# Patient Record
Sex: FEMALE | Race: OTHER | Employment: OTHER | URBAN - METROPOLITAN AREA
[De-identification: names, ages, dates, MRNs, and addresses within clinical notes are randomized per-mention and may not be internally consistent; named-entity substitution may affect disease eponyms.]

---

## 2017-01-04 NOTE — PROGRESS NOTES
[facility] / [facility]  ECT Procedure Note    Rosiland Spurling Patient Status:  Outpatient   Age/Gender 77year old female MRN [id number]   Location [address] [facility] Attending Jeffery Stacy MD   Hosp Day # 0 PCP Ta

## 2017-01-04 NOTE — OR NURSING
Pt's glucoscan 264. Pt had received insulin pre procedure. Dr. Marisabel Carpenter notified. No orders received. Pt alert. Skin warm and dry.

## 2017-01-04 NOTE — PROGRESS NOTES
[facility] / [facility]  ECT History & Physical    Tiffany Ledesma Patient Status:  Outpatient   Age/Gender 77year old female MRN [id number]   Location [address] [facility] Attending Mona Lynne MD   Hosp Day # 0 PCP Sharan Flores Onset   • Diabetes Mother    • Cancer Sister    • Diabetes Son    • Diabetes Brother        ROS: As above.   Otherwise unremarkable    Physical Exam:   Ht 61\"    General Appearance:    Alert, cooperative, no distress, appears stated age   Head:    Normocep

## 2017-02-01 ENCOUNTER — TELEPHONE (OUTPATIENT)
Dept: INTERNAL MEDICINE CLINIC | Facility: CLINIC | Age: 77
End: 2017-02-01

## 2017-02-01 NOTE — PROGRESS NOTES
[facility] / [facility]  ECT History & Physical    Ana Maria Goldstein Patient Status:  Outpatient   Age/Gender 77year old female MRN [id number]   Location [address] [facility] Attending Nicole Loredo MD   Hosp Day # 0 PCP Aretha Hoskins Diabetes Brother        ROS:   Unremarkable    Physical Exam:   /76 mmHg  Pulse 67  Resp 14  Ht 61\"  SpO2 93%    General Appearance:    Alert, cooperative, no distress, appears stated age   Head:    Normocephalic, without obvious abnormality, atraum

## 2017-02-01 NOTE — PROGRESS NOTES
[facility] / [facility]  ECT Procedure Note    Josh Phipps Patient Status:  Outpatient   Age/Gender 77year old female MRN [id number]   Location [address] [facility] Attending Tori Man MD   Hosp Day # 0 PCP Ta

## 2017-02-20 ENCOUNTER — OFFICE VISIT (OUTPATIENT)
Dept: SURGERY | Facility: CLINIC | Age: 77
End: 2017-02-20

## 2017-02-20 VITALS — HEIGHT: 62 IN | WEIGHT: 165 LBS | BODY MASS INDEX: 30.36 KG/M2

## 2017-02-20 DIAGNOSIS — Z85.3 HISTORY OF BREAST CANCER: ICD-10-CM

## 2017-02-20 DIAGNOSIS — E11.65 UNCONTROLLED TYPE 2 DIABETES MELLITUS WITH HYPERGLYCEMIA, WITHOUT LONG-TERM CURRENT USE OF INSULIN (HCC): ICD-10-CM

## 2017-02-20 DIAGNOSIS — I87.1 SUPERIOR VENA CAVA SYNDROME: Primary | ICD-10-CM

## 2017-02-20 DIAGNOSIS — F31.5 BIPOLAR I DISORDER, MOST RECENT EPISODE (OR CURRENT) DEPRESSED, SEVERE, SPECIFIED AS WITH PSYCHOTIC BEHAVIOR (HCC): ICD-10-CM

## 2017-02-20 DIAGNOSIS — F25.0 SCHIZOAFFECTIVE DISORDER, BIPOLAR TYPE (HCC): ICD-10-CM

## 2017-02-20 PROCEDURE — 99205 OFFICE O/P NEW HI 60 MIN: CPT | Performed by: COLON & RECTAL SURGERY

## 2017-02-20 NOTE — PATIENT INSTRUCTIONS
This patient presents for consultation from the primary care service and her psychiatrist regarding placement of a Port-A-Cath and IV access for ECT therapy.     This patient has schizophrenia and apparently benefits from ECT for her schizophrenia and bipol slightly chronic. She has no facial swelling or edema. She has very minimally prominent JVD. She has had incisions in the subclavicular regions of the right and left chest wall indicative of previous port access to the vena cava.   She has a right breast syndrome, then a upper extremity or subclavian PowerPort may not be indicated we may have to go into a groin access with the port being placed on the abdominal wall.     She has a superior vena cava syndrome, further vascular workup may be necessary and may

## 2017-02-20 NOTE — H&P
New Patient Visit Note       Active Problems      1. Superior vena cava syndrome    2. History of breast cancer    3. Bipolar I disorder, most recent episode (or current) depressed, severe, specified as with psychotic behavior ([facility]    4.  Schizoaffective d ovaries. The patient does not appear to be on any hormonal replacement therapy. She is no longer seeing an oncologist or any surgeon regarding her chest wall and for cancer surveillance.     It has been many years since her last right-sided mammogram. Past Surgical History    MASTECTOMY LEFT      CATARACT  04/2006    Comment left    COLONOSCOPY      Comment tublar adenoma    ECT PROVIDED Bilateral 11/2014    Comment ! st Tx. 11/2014       The family history and social history have been reviewed b the mouth or throat as needed. Disp:  Rfl:    omeprazole 20 MG Oral Capsule Delayed Release Take 20 mg by mouth every morning before breakfast. Disp:  Rfl:    DiphenhydrAMINE HCl 25 MG Oral Cap Take 25 mg by mouth every 6 (six) hours as needed for Itching. Neurological: Negative for tremors, syncope and weakness. Hematological: Negative for adenopathy. Does not bruise/bleed easily. Psychiatric/Behavioral: Negative for behavioral problems and sleep disturbance.        Physical Findings   Ht 62\"  Wt 165 patient has no palpable supra clavicular lymph nodes, and no palpable anterior neck nodes. The patient has no other nodules or lymphadenopathy in the entire neck region.     Breath sounds are clear to auscultation bilaterally without wheezes rales or rhonc has had breast cancer of the left breast when she was 55years old. She has had Port-A-Cath on both the right and left side. She has a history of DVT including lower extremities and at least one upper extremity.   She has lymphedema of the left arm that i nipple. The right breast parenchyma shows no mass lesions or nodules. There are no changes to the skin other than the prominent venous distribution consistent with the entire chest wall. The right axilla shows no evidence of lymphadenopathy.     The left carotid off superior vena cava. It may be a complication of her previous chemotherapy. She may also be in some type of a hypercoagulable state. My next visit will be to make further surgical decision making.   The mammogram will help us fully assess th

## 2017-02-21 RX ORDER — HEPARIN SODIUM 5000 [USP'U]/ML
5000 INJECTION, SOLUTION INTRAVENOUS; SUBCUTANEOUS ONCE
Status: CANCELLED | OUTPATIENT
Start: 2017-02-21 | End: 2017-02-21

## 2017-02-21 RX ORDER — LEVOTHYROXINE SODIUM 0.07 MG/1
100 TABLET ORAL
COMMUNITY
End: 2017-09-12 | Stop reason: DRUGHIGH

## 2017-02-21 RX ORDER — ONDANSETRON 4 MG/1
4 TABLET, ORALLY DISINTEGRATING ORAL EVERY 8 HOURS PRN
COMMUNITY
End: 2018-03-27 | Stop reason: ALTCHOICE

## 2017-02-21 RX ORDER — DEXTROSE MONOHYDRATE 25 G/50ML
50 INJECTION, SOLUTION INTRAVENOUS
Status: DISCONTINUED | OUTPATIENT
Start: 2017-02-21 | End: 2017-02-21

## 2017-02-21 RX ORDER — SODIUM CHLORIDE, SODIUM LACTATE, POTASSIUM CHLORIDE, CALCIUM CHLORIDE 600; 310; 30; 20 MG/100ML; MG/100ML; MG/100ML; MG/100ML
INJECTION, SOLUTION INTRAVENOUS CONTINUOUS
Status: CANCELLED | OUTPATIENT
Start: 2017-02-21

## 2017-02-21 RX ORDER — LOPERAMIDE HYDROCHLORIDE 2 MG/1
2 CAPSULE ORAL EVERY 4 HOURS PRN
COMMUNITY
End: 2018-05-31

## 2017-02-21 RX ORDER — FUROSEMIDE 40 MG/1
40 TABLET ORAL 2 TIMES DAILY
Status: ON HOLD | COMMUNITY
End: 2017-03-06

## 2017-02-21 RX ORDER — METOPROLOL SUCCINATE 25 MG/1
25 TABLET, EXTENDED RELEASE ORAL DAILY
COMMUNITY

## 2017-02-21 NOTE — PLAN OF CARE
RN spoke to [facility] at [facility]. This place has a service that can do lab and EKGs, so RN sent the fax requirements to [facility]. RN notified daughter of this information as well.

## 2017-02-25 ENCOUNTER — HOSPITAL ENCOUNTER (OUTPATIENT)
Dept: MAMMOGRAPHY | Age: 77
Discharge: HOME | End: 2017-02-25
Attending: COLON & RECTAL SURGERY
Payer: MEDICARE

## 2017-02-25 ENCOUNTER — HOSPITAL ENCOUNTER (OUTPATIENT)
Dept: ULTRASOUND IMAGING | Age: 77
Discharge: HOME | End: 2017-02-25
Attending: COLON & RECTAL SURGERY
Payer: MEDICARE

## 2017-02-25 DIAGNOSIS — I87.1 SUPERIOR VENA CAVA SYNDROME: ICD-10-CM

## 2017-02-25 DIAGNOSIS — Z85.3 HISTORY OF BREAST CANCER: ICD-10-CM

## 2017-02-25 PROCEDURE — 77067 SCR MAMMO BI INCL CAD: CPT

## 2017-02-25 PROCEDURE — 93970 EXTREMITY STUDY: CPT

## 2017-02-28 NOTE — PROGRESS NOTES
Quick Note:    Mammogram reviewed. Mammogram normal  If patient had an appointment when mammogram results were available, no action is necessary.   If there was no appointment when results were obtained, please schedule an appointment.  ______

## 2017-02-28 NOTE — PROGRESS NOTES
Quick Note:      This patient needs a CT scan of the chest to evaluate her for a superior vena cava syndrome    ______

## 2017-03-01 NOTE — PROGRESS NOTES
[facility] / [facility]  ECT Procedure Note    Brian Lopez Patient Status:  Outpatient   Age/Gender 77year old female MRN [id number]   Location [address] [facility] Attending Kyrie Arizmendi MD   Hosp Day # 0 PCP Ta

## 2017-03-01 NOTE — OR NURSING
Called ,Surgeon office and left message on [employer] advising of contraindication alert and requested that surgeon review and advise

## 2017-03-01 NOTE — PROGRESS NOTES
Dilcia Hagen / [facility]  ECT History & Physical    [facility] Patient Status:  Outpatient   Age/Gender 77year old female MRN [id number]   Location [address] [facility] Attending Henna Haywood MD   Hosp Day # 0 PCP Mitch Isabel with mastectomy, chemo and radiation. RADIATION LEFT Left 1998    Comment left breast cancer with mastectomy, chemo and radiation. MASTECTOMY LEFT Left 1998    Comment left breast cancer with mastectomy, chemo and radiation. Family History:   Fa

## 2017-03-01 NOTE — OR NURSING
Contraindication alert sent to ,surgeon to advise of increase bleeding risk due to interraction of Heparin and Toradol

## 2017-03-02 ENCOUNTER — TELEPHONE (OUTPATIENT)
Dept: SURGERY | Facility: CLINIC | Age: 77
End: 2017-03-02

## 2017-03-02 ENCOUNTER — ANESTHESIA EVENT (OUTPATIENT)
Dept: SURGERY | Facility: HOSPITAL | Age: 77
DRG: 300 | End: 2017-03-02
Payer: MEDICARE

## 2017-03-02 DIAGNOSIS — I87.1 SUPERIOR VENA CAVA SYNDROME: Primary | ICD-10-CM

## 2017-03-02 NOTE — PROGRESS NOTES
Quick Note:    Notified daughter that CT chest will be done prior to surgery on 3/3/17.  Order placed.  ______

## 2017-03-02 NOTE — OR PREOP
Spoke with Bret Cazares . .. To  Indicate they need to get orders for CT dye allergy, Insulin dosing for OR day, pls fax bmp and ekg readings to [facility]. Arrival time 1130. Hold Metformin in am. # given for questions if needed.

## 2017-03-02 NOTE — OR NURSING
Scheduled CT scan at 1230 with central scheduling ,[facility]. Contacted ULICES Cruz for ,surgeon to advise that patient will need premedication due to iodine allergy.  SHe will contact radiology to obtain protocol orders and call to [phone] [facility]

## 2017-03-02 NOTE — TELEPHONE ENCOUNTER
Left message for Brandy Camacho at [facility]. Patient has allergy to iodine and need to be pre medicated. She is scheduled for CT tomorrow.  She need to have Prednisone 50 mg at 13 hr, 7 hrs and 1 hr prior to contrast and benadryl 50 mg 1 hour prior to contrast p

## 2017-03-02 NOTE — OR NURSING
Have not received lab result or signed Ekg for procedure tomorrow. Left message on voice mail of [facility] supervisor requesting call back asap.

## 2017-03-03 ENCOUNTER — ANESTHESIA (OUTPATIENT)
Dept: SURGERY | Facility: HOSPITAL | Age: 77
DRG: 300 | End: 2017-03-03
Payer: MEDICARE

## 2017-03-03 ENCOUNTER — HOSPITAL ENCOUNTER (OUTPATIENT)
Dept: CT IMAGING | Facility: HOSPITAL | Age: 77
Discharge: HOME | DRG: 300 | End: 2017-03-03
Attending: COLON & RECTAL SURGERY
Payer: MEDICARE

## 2017-03-03 ENCOUNTER — SURGERY (OUTPATIENT)
Age: 77
End: 2017-03-03

## 2017-03-03 ENCOUNTER — APPOINTMENT (OUTPATIENT)
Dept: GENERAL RADIOLOGY | Facility: HOSPITAL | Age: 77
DRG: 300 | End: 2017-03-03
Attending: COLON & RECTAL SURGERY
Payer: MEDICARE

## 2017-03-03 ENCOUNTER — HOSPITAL ENCOUNTER (INPATIENT)
Facility: HOSPITAL | Age: 77
LOS: 3 days | Discharge: NURSING, REHAB OR LONG-TERM CARE | DRG: 300 | End: 2017-03-06
Attending: COLON & RECTAL SURGERY | Admitting: COLON & RECTAL SURGERY
Payer: MEDICARE

## 2017-03-03 ENCOUNTER — TELEPHONE (OUTPATIENT)
Dept: SURGERY | Facility: CLINIC | Age: 77
End: 2017-03-03

## 2017-03-03 DIAGNOSIS — I87.1 SUPERIOR VENA CAVA SYNDROME: ICD-10-CM

## 2017-03-03 DIAGNOSIS — D50.9 IRON DEFICIENCY ANEMIA, UNSPECIFIED IRON DEFICIENCY ANEMIA TYPE: Primary | ICD-10-CM

## 2017-03-03 PROBLEM — Z91.81 AT RISK FOR FALLING: Status: ACTIVE | Noted: 2017-03-03

## 2017-03-03 LAB
APTT PPP: 27.3 SECONDS (ref 25–34)
BUN BLD-MCNC: 26 MG/DL (ref 8–20)
CALCIUM BLD-MCNC: 9.5 MG/DL (ref 8.3–10.3)
CHLORIDE: 99 MMOL/L (ref 101–111)
CHOLEST SMN-MCNC: 137 MG/DL (ref ?–200)
CO2: 25 MMOL/L (ref 22–32)
CREAT BLD-MCNC: 1.39 MG/DL (ref 0.55–1.02)
D-DIMER: 1 UG/ML FEU (ref 0–0.49)
ERYTHROCYTE [DISTWIDTH] IN BLOOD BY AUTOMATED COUNT: 15.4 % (ref 11.5–16)
EST. AVERAGE GLUCOSE BLD GHB EST-MCNC: 209 MG/DL (ref 68–126)
GLUCOSE BLD-MCNC: 286 MG/DL (ref 65–99)
GLUCOSE BLD-MCNC: 297 MG/DL (ref 70–99)
GLUCOSE BLD-MCNC: 317 MG/DL (ref 65–99)
GLUCOSE BLD-MCNC: 323 MG/DL (ref 65–99)
GLUCOSE BLD-MCNC: 336 MG/DL (ref 65–99)
GLUCOSE BLD-MCNC: 359 MG/DL (ref 65–99)
HBA1C MFR BLD HPLC: 8.9 % (ref ?–5.7)
HCT VFR BLD AUTO: 32.6 % (ref 34–50)
HDLC SERPL-MCNC: 62 MG/DL (ref 45–?)
HDLC SERPL: 2.21 {RATIO} (ref ?–4.44)
HGB BLD-MCNC: 10.1 G/DL (ref 12–16)
LDLC SERPL CALC-MCNC: 49 MG/DL (ref ?–130)
MCH RBC QN AUTO: 25.9 PG (ref 27–33.2)
MCHC RBC AUTO-ENTMCNC: 31 G/DL (ref 31–37)
MCV RBC AUTO: 83.6 FL (ref 81–100)
NONHDLC SERPL-MCNC: 75 MG/DL (ref ?–130)
PLATELET # BLD AUTO: 261 10(3)UL (ref 150–450)
POTASSIUM SERPL-SCNC: 3.9 MMOL/L (ref 3.6–5.1)
RBC # BLD AUTO: 3.9 X10(6)UL (ref 3.8–5.1)
RED CELL DISTRIBUTION WIDTH-SD: 46.5 FL (ref 35.1–46.3)
SODIUM SERPL-SCNC: 135 MMOL/L (ref 136–144)
TRIGLYCERIDES: 131 MG/DL (ref ?–150)
VLDL: 26 MG/DL (ref 5–40)
WBC # BLD AUTO: 9.3 X10(3) UL (ref 4–13)

## 2017-03-03 PROCEDURE — B51CYZA FLUOROSCOPY OF LEFT LOWER EXTREMITY VEINS USING OTHER CONTRAST, GUIDANCE: ICD-10-PCS | Performed by: COLON & RECTAL SURGERY

## 2017-03-03 PROCEDURE — 99223 1ST HOSP IP/OBS HIGH 75: CPT | Performed by: HOSPITALIST

## 2017-03-03 PROCEDURE — 0YJ73ZZ INSPECTION OF RIGHT FEMORAL REGION, PERCUTANEOUS APPROACH: ICD-10-PCS | Performed by: COLON & RECTAL SURGERY

## 2017-03-03 PROCEDURE — 0JHP3WZ INSERTION OF TOTALLY IMPLANTABLE VASCULAR ACCESS DEVICE INTO LEFT LOWER LEG SUBCUTANEOUS TISSUE AND FASCIA, PERCUTANEOUS APPROACH: ICD-10-PCS | Performed by: COLON & RECTAL SURGERY

## 2017-03-03 PROCEDURE — 76000 FLUOROSCOPY <1 HR PHYS/QHP: CPT

## 2017-03-03 PROCEDURE — 06HN33Z INSERTION OF INFUSION DEVICE INTO LEFT FEMORAL VEIN, PERCUTANEOUS APPROACH: ICD-10-PCS | Performed by: COLON & RECTAL SURGERY

## 2017-03-03 PROCEDURE — 71260 CT THORAX DX C+: CPT

## 2017-03-03 DEVICE — POWERPORT M.R.I. IMPLANTABLE PORT WITH PRE-ATTACHED 9.6F  OPEN-ENDED SINGLE-LUMEN VENOUS CATHETER. INTERMEDIATE KIT (WITHOUT SUTURE PLUGS)
Type: IMPLANTABLE DEVICE | Site: GROIN | Status: FUNCTIONAL
Brand: POWERPORT M.R.I.

## 2017-03-03 RX ORDER — HEPARIN SODIUM AND DEXTROSE 10000; 5 [USP'U]/100ML; G/100ML
INJECTION INTRAVENOUS CONTINUOUS
Status: DISCONTINUED | OUTPATIENT
Start: 2017-03-04 | End: 2017-03-05

## 2017-03-03 RX ORDER — PREGABALIN 75 MG/1
75 CAPSULE ORAL 2 TIMES DAILY
Status: DISCONTINUED | OUTPATIENT
Start: 2017-03-03 | End: 2017-03-06

## 2017-03-03 RX ORDER — ONDANSETRON 2 MG/ML
4 INJECTION INTRAMUSCULAR; INTRAVENOUS AS NEEDED
Status: DISCONTINUED | OUTPATIENT
Start: 2017-03-03 | End: 2017-03-03 | Stop reason: HOSPADM

## 2017-03-03 RX ORDER — DEXTROSE MONOHYDRATE 25 G/50ML
50 INJECTION, SOLUTION INTRAVENOUS
Status: DISCONTINUED | OUTPATIENT
Start: 2017-03-03 | End: 2017-03-03

## 2017-03-03 RX ORDER — METOCLOPRAMIDE HYDROCHLORIDE 5 MG/ML
10 INJECTION INTRAMUSCULAR; INTRAVENOUS AS NEEDED
Status: DISCONTINUED | OUTPATIENT
Start: 2017-03-03 | End: 2017-03-03 | Stop reason: HOSPADM

## 2017-03-03 RX ORDER — SERTRALINE HYDROCHLORIDE 100 MG/1
100 TABLET, FILM COATED ORAL DAILY
Status: DISCONTINUED | OUTPATIENT
Start: 2017-03-03 | End: 2017-03-06

## 2017-03-03 RX ORDER — INSULIN ASPART 100 [IU]/ML
8 INJECTION, SOLUTION INTRAVENOUS; SUBCUTANEOUS ONCE
Status: COMPLETED | OUTPATIENT
Start: 2017-03-03 | End: 2017-03-03

## 2017-03-03 RX ORDER — HYDROCODONE BITARTRATE AND ACETAMINOPHEN 5; 325 MG/1; MG/1
1 TABLET ORAL AS NEEDED
Status: DISCONTINUED | OUTPATIENT
Start: 2017-03-03 | End: 2017-03-03 | Stop reason: HOSPADM

## 2017-03-03 RX ORDER — ENOXAPARIN SODIUM 100 MG/ML
40 INJECTION SUBCUTANEOUS NIGHTLY
Status: DISCONTINUED | OUTPATIENT
Start: 2017-03-03 | End: 2017-03-03

## 2017-03-03 RX ORDER — DEXTROSE MONOHYDRATE 25 G/50ML
50 INJECTION, SOLUTION INTRAVENOUS
Status: DISCONTINUED | OUTPATIENT
Start: 2017-03-03 | End: 2017-03-03 | Stop reason: HOSPADM

## 2017-03-03 RX ORDER — HYDROCODONE BITARTRATE AND ACETAMINOPHEN 5; 325 MG/1; MG/1
2 TABLET ORAL AS NEEDED
Status: DISCONTINUED | OUTPATIENT
Start: 2017-03-03 | End: 2017-03-03

## 2017-03-03 RX ORDER — MEPERIDINE HYDROCHLORIDE 25 MG/ML
12.5 INJECTION INTRAMUSCULAR; INTRAVENOUS; SUBCUTANEOUS AS NEEDED
Status: DISCONTINUED | OUTPATIENT
Start: 2017-03-03 | End: 2017-03-03 | Stop reason: HOSPADM

## 2017-03-03 RX ORDER — INSULIN ASPART 100 [IU]/ML
INJECTION, SOLUTION INTRAVENOUS; SUBCUTANEOUS
Status: DISPENSED
Start: 2017-03-03 | End: 2017-03-04

## 2017-03-03 RX ORDER — MIDAZOLAM HYDROCHLORIDE 1 MG/ML
1 INJECTION INTRAMUSCULAR; INTRAVENOUS EVERY 5 MIN PRN
Status: DISCONTINUED | OUTPATIENT
Start: 2017-03-03 | End: 2017-03-03 | Stop reason: HOSPADM

## 2017-03-03 RX ORDER — HYDROMORPHONE HYDROCHLORIDE 1 MG/ML
0.4 INJECTION, SOLUTION INTRAMUSCULAR; INTRAVENOUS; SUBCUTANEOUS EVERY 5 MIN PRN
Status: DISCONTINUED | OUTPATIENT
Start: 2017-03-03 | End: 2017-03-03 | Stop reason: HOSPADM

## 2017-03-03 RX ORDER — HEPARIN SODIUM AND DEXTROSE 10000; 5 [USP'U]/100ML; G/100ML
18 INJECTION INTRAVENOUS ONCE
Status: COMPLETED | OUTPATIENT
Start: 2017-03-03 | End: 2017-03-03

## 2017-03-03 RX ORDER — HEPARIN SODIUM 5000 [USP'U]/ML
5000 INJECTION, SOLUTION INTRAVENOUS; SUBCUTANEOUS ONCE
Status: COMPLETED | OUTPATIENT
Start: 2017-03-03 | End: 2017-03-03

## 2017-03-03 RX ORDER — METOPROLOL SUCCINATE 25 MG/1
25 TABLET, EXTENDED RELEASE ORAL
Status: DISCONTINUED | OUTPATIENT
Start: 2017-03-04 | End: 2017-03-06

## 2017-03-03 RX ORDER — OLANZAPINE 20 MG/1
20 TABLET ORAL NIGHTLY
Status: DISCONTINUED | OUTPATIENT
Start: 2017-03-03 | End: 2017-03-06

## 2017-03-03 RX ORDER — SODIUM CHLORIDE, SODIUM LACTATE, POTASSIUM CHLORIDE, CALCIUM CHLORIDE 600; 310; 30; 20 MG/100ML; MG/100ML; MG/100ML; MG/100ML
INJECTION, SOLUTION INTRAVENOUS CONTINUOUS
Status: DISCONTINUED | OUTPATIENT
Start: 2017-03-03 | End: 2017-03-03

## 2017-03-03 RX ORDER — NALOXONE HYDROCHLORIDE 0.4 MG/ML
80 INJECTION, SOLUTION INTRAMUSCULAR; INTRAVENOUS; SUBCUTANEOUS AS NEEDED
Status: DISCONTINUED | OUTPATIENT
Start: 2017-03-03 | End: 2017-03-03 | Stop reason: HOSPADM

## 2017-03-03 RX ORDER — DEXAMETHASONE SODIUM PHOSPHATE 4 MG/ML
4 VIAL (ML) INJECTION AS NEEDED
Status: DISCONTINUED | OUTPATIENT
Start: 2017-03-03 | End: 2017-03-03 | Stop reason: HOSPADM

## 2017-03-03 RX ORDER — HYDROCODONE BITARTRATE AND ACETAMINOPHEN 5; 325 MG/1; MG/1
1 TABLET ORAL AS NEEDED
Status: DISCONTINUED | OUTPATIENT
Start: 2017-03-03 | End: 2017-03-03

## 2017-03-03 RX ORDER — HYDROCODONE BITARTRATE AND ACETAMINOPHEN 5; 325 MG/1; MG/1
2 TABLET ORAL AS NEEDED
Status: DISCONTINUED | OUTPATIENT
Start: 2017-03-03 | End: 2017-03-03 | Stop reason: HOSPADM

## 2017-03-03 RX ORDER — BUPIVACAINE HYDROCHLORIDE AND EPINEPHRINE 5; 5 MG/ML; UG/ML
INJECTION, SOLUTION EPIDURAL; INTRACAUDAL; PERINEURAL AS NEEDED
Status: DISCONTINUED | OUTPATIENT
Start: 2017-03-03 | End: 2017-03-03 | Stop reason: HOSPADM

## 2017-03-03 RX ORDER — ACETAMINOPHEN 325 MG/1
650 TABLET ORAL EVERY 6 HOURS PRN
Status: DISCONTINUED | OUTPATIENT
Start: 2017-03-03 | End: 2017-03-06

## 2017-03-03 RX ORDER — HYDRALAZINE HYDROCHLORIDE 50 MG/1
100 TABLET, FILM COATED ORAL 2 TIMES DAILY
Status: DISCONTINUED | OUTPATIENT
Start: 2017-03-03 | End: 2017-03-06

## 2017-03-03 RX ORDER — INSULIN ASPART 100 [IU]/ML
INJECTION, SOLUTION INTRAVENOUS; SUBCUTANEOUS ONCE
Status: COMPLETED | OUTPATIENT
Start: 2017-03-03 | End: 2017-03-03

## 2017-03-03 RX ORDER — DIPHENHYDRAMINE HYDROCHLORIDE 50 MG/ML
12.5 INJECTION INTRAMUSCULAR; INTRAVENOUS AS NEEDED
Status: DISCONTINUED | OUTPATIENT
Start: 2017-03-03 | End: 2017-03-03 | Stop reason: HOSPADM

## 2017-03-03 NOTE — TELEPHONE ENCOUNTER
Dr. Chapman Saba called results of chest CT - shows SVC is occluded. Information conveyed to [facility] who is preparing pt for surgery and she will contact Dr. Kasie Mg with this information.

## 2017-03-03 NOTE — H&P (VIEW-ONLY)
New Patient Visit Note       Active Problems      1. Superior vena cava syndrome    2. History of breast cancer    3. Bipolar I disorder, most recent episode (or current) depressed, severe, specified as with psychotic behavior ([facility] [address]    4.  Schizoaffective d She had her first period at age 13, her first child at age 26. She went through the change in life at age 55. She has a sister who  from breast cancer at age 48. There is nobody in the family with cancer of the ovaries.     The patient does not appea • Cancer ([facility] [address]      hx of breast ca to left breast documented in progress note 2012, unknown exact time of breast cancer   • Personal history of antineoplastic chemotherapy      OVER 20 YRS AGO   • Exposure to radiation          Past Surgical History    MA ipratropium-albuterol 0.5-2.5 (3) MG/3ML Inhalation Solution Take 3 mL by nebulization every 6 (six) hours as needed. Disp:  Rfl:    Sertraline HCl 100 MG Oral Tab Take 100 mg by mouth daily.  Disp:  Rfl:    Benzocaine-Menthol 4-9 MG Mouth/Throat Lozenge Us Gastrointestinal: Negative for nausea, vomiting, abdominal pain, diarrhea, constipation, blood in stool, abdominal distention and anal bleeding. Genitourinary: Negative for dysuria, urgency, frequency and difficulty urinating.    Musculoskeletal: Negative The left chest wall reveals a mastectomy site. There is scarring and deformity consistent with radiation therapy to the chest wall. She has a very long incision that extends medial to lateral into the axilla.   There are no palpable nodules or abnormaliti Uncontrolled type 2 diabetes mellitus with hyperglycemia, without long-term current use of insulin ([facility] [address]      Plan   This patient presents for consultation from the primary care service and her psychiatrist regarding placement of a Port-A-Cath and IV acces Clinical examination today reveals her chest wall to be completely replaced with revascularization on both sides. She has prominent veins all the way from the neck and down to the upper abdominal wall. They are 3-5 mm in thickness.   They are definitely v We will be ordering a mammogram within the next few days. We will also be ordering a venous Doppler study to rule out superior vena cava syndrome.   This Doppler study should include the superior vena cava, the brachiocephalic vessels bilaterally, and both

## 2017-03-03 NOTE — OR NURSING
Pt received from Ct scan. Ct scan result called to Dr Angela Durham. Ok per Dr Angela Durham to continue to pre-op the patient. Also, per Dr Angela Durham ok for Sub-Q heparin as ordered. Pt's blood sugar 336. [place] called and Meds clarified.  Dr Pattie Simons called and 8 units of N

## 2017-03-03 NOTE — INTERVAL H&P NOTE
Pre-op Diagnosis: Superior vena cava syndrome [I87.1]    The above referenced H&P was reviewed by Horacio Anderson MD on 3/3/2017, the patient was examined and no significant changes have occurred in the patient's condition since the H&P was performed.   I dis

## 2017-03-03 NOTE — ANESTHESIA POSTPROCEDURE EVALUATION
[address] [facility] Patient Status:  Hospital Outpatient Surgery   Age/Gender 77year old female MRN [id number]   [facility] SURGERY Attending Roney Luis MD   Hosp Day # 0 PCP Ping Nieto MD       Anesthesia Post-op Note

## 2017-03-03 NOTE — H&P
TR HOSPITALIST  History and Physical     Fionarichi Dewey Patient Status:  Hospital Outpatient Surgery    1940 MRN [id number]   Location [facility] POST ANESTHESIA CARE UNIT Attending Capri Gutierrez MD   [address] [facility] Day # 0 PCP Kodak Durham MD Alzheimer disease    • Type 2 diabetes mellitus ([facility]    • Diabetic neuropathy ([facility]    • Hypothyroidism    • Personal history of antineoplastic chemotherapy      OVER 20 YRS AGO   • Exposure to radiation    • Cancer ([facility] 1998     left breast cancer in 19 DiphenhydrAMINE HCl 25 MG Oral Cap Take 25 mg by mouth every 4 (four) hours as needed for Itching. Disp:  Rfl:    OLANZapine (ZYPREXA) 20 MG Oral Tab Take 1 tablet (20 mg total) by mouth nightly.  Disp: 30 tablet Rfl: 5   MetFORMIN HCl (GLUCOPHAGE) 1000 neurological deficits. CNII-XII grossly intact. Musculoskeletal: Moves all extremities. Extremities: No edema or cyanosis. Integument: No rashes or lesions. Psychiatric: Appropriate mood and affect.       Diagnostic Data:      Labs:  No results for inp

## 2017-03-03 NOTE — OPERATIVE REPORT
[facility]  Operative Note    Bryce Memory Location: OR   CSN [id number] MRN [id number]   Admission Date 3/3/2017 Operation Date 3/3/2017   Attending Physician Sg Foss MD Operating Physician Viktoria Cobos MD     Pre-Operative Diagnosis: Loc Padilla abdomen and both inguinal regions in the anterior medial thigh. I began the procedure by attempting venipuncture on the right side. I did hit the artery once, had the vein a second time but was unable to thread the wire past the common iliac vein.     I

## 2017-03-03 NOTE — ANESTHESIA PREPROCEDURE EVALUATION
PRE-OP EVALUATION    Patient Name: Angel Grigsby    Pre-op Diagnosis: Superior vena cava syndrome [I87.1]    Procedure(s):  INSERTION OF PORT-A-CATH    Surgeon(s) and Role:     Elfego Healy MD - Primary    Pre-op vitals reviewed.         Body mass i Apply to eye daily. 0.025-0.2-0.5% drops one drop both eyes daily Disp:  Rfl:    pregabalin (LYRICA) 75 MG Oral Cap Take 75 mg by mouth 2 (two) times daily. Disp:  Rfl:    Melatonin 5 MG Oral Tab Take by mouth nightly.    Disp:  Rfl:    HydrALAZINE HCl 100 anemia                   Pulmonary    Negative pulmonary ROS. Neuro/Psych  Comment: schizophernia.     (+) depression                                  Past Surgical History    CATARACT  04/2006    Comment left    COLONOSCOPY      Comme

## 2017-03-04 ENCOUNTER — TELEPHONE (OUTPATIENT)
Dept: HEMATOLOGY/ONCOLOGY | Facility: HOSPITAL | Age: 77
End: 2017-03-04

## 2017-03-04 ENCOUNTER — APPOINTMENT (OUTPATIENT)
Dept: CV DIAGNOSTICS | Facility: HOSPITAL | Age: 77
DRG: 300 | End: 2017-03-04
Attending: INTERNAL MEDICINE
Payer: MEDICARE

## 2017-03-04 PROBLEM — D50.9 IRON DEFICIENCY ANEMIA, UNSPECIFIED: Status: ACTIVE | Noted: 2017-03-04

## 2017-03-04 LAB
APTT PPP: 123.8 SECONDS (ref 25–34)
APTT PPP: 144.5 SECONDS (ref 25–34)
APTT PPP: 63.8 SECONDS (ref 25–34)
BASOPHILS # BLD AUTO: 0.06 X10(3) UL (ref 0–0.1)
BASOPHILS NFR BLD AUTO: 0.6 %
BUN BLD-MCNC: 35 MG/DL (ref 8–20)
CALCIUM BLD-MCNC: 9.5 MG/DL (ref 8.3–10.3)
CHLORIDE: 98 MMOL/L (ref 101–111)
CO2: 28 MMOL/L (ref 22–32)
CREAT BLD-MCNC: 1.61 MG/DL (ref 0.55–1.02)
DEPRECATED HBV CORE AB SER IA-ACNC: 6 NG/ML (ref 10–291)
EOSINOPHIL # BLD AUTO: 0.11 X10(3) UL (ref 0–0.3)
EOSINOPHIL NFR BLD AUTO: 1.1 %
ERYTHROCYTE [DISTWIDTH] IN BLOOD BY AUTOMATED COUNT: 15.5 % (ref 11.5–16)
GLUCOSE BLD-MCNC: 146 MG/DL (ref 65–99)
GLUCOSE BLD-MCNC: 160 MG/DL (ref 70–99)
GLUCOSE BLD-MCNC: 240 MG/DL (ref 65–99)
GLUCOSE BLD-MCNC: 350 MG/DL (ref 65–99)
GLUCOSE BLD-MCNC: 363 MG/DL (ref 65–99)
HAV AB SERPL IA-ACNC: 293 PG/ML (ref 193–986)
HCT VFR BLD AUTO: 31.8 % (ref 34–50)
HGB BLD-MCNC: 9.8 G/DL (ref 12–16)
IMMATURE GRANULOCYTE COUNT: 0.05 X10(3) UL (ref 0–1)
IMMATURE GRANULOCYTE RATIO %: 0.5 %
IRON SATURATION: 7 % (ref 13–45)
IRON: 36 UG/DL (ref 28–170)
LYMPHOCYTES # BLD AUTO: 2.28 X10(3) UL (ref 0.9–4)
LYMPHOCYTES NFR BLD AUTO: 22.4 %
MCH RBC QN AUTO: 25.9 PG (ref 27–33.2)
MCHC RBC AUTO-ENTMCNC: 30.8 G/DL (ref 31–37)
MCV RBC AUTO: 83.9 FL (ref 81–100)
MONOCYTES # BLD AUTO: 0.81 X10(3) UL (ref 0.1–0.6)
MONOCYTES NFR BLD AUTO: 8 %
NEUTROPHIL ABS PRELIM: 6.86 X10 (3) UL (ref 1.3–6.7)
NEUTROPHILS # BLD AUTO: 6.86 X10(3) UL (ref 1.3–6.7)
NEUTROPHILS NFR BLD AUTO: 67.4 %
PLATELET # BLD AUTO: 234 10(3)UL (ref 150–450)
POTASSIUM SERPL-SCNC: 3.4 MMOL/L (ref 3.6–5.1)
POTASSIUM SERPL-SCNC: 4.7 MMOL/L (ref 3.6–5.1)
RBC # BLD AUTO: 3.79 X10(6)UL (ref 3.8–5.1)
RED CELL DISTRIBUTION WIDTH-SD: 47.2 FL (ref 35.1–46.3)
RETIC ABS CALC AUTO: 75.8 X10(3) UL (ref 22.5–147.5)
RETIC IRF CALC: 0.28 RATIO (ref 0.09–0.3)
RETIC%: 2 % (ref 0.5–2.5)
RETICULOCYTE HEMOGLOBIN EQUIVALENT: 29.8 PG (ref 28.2–36.3)
SODIUM SERPL-SCNC: 137 MMOL/L (ref 136–144)
TOTAL IRON BINDING CAPACITY: 550 UG/DL (ref 298–536)
TRANSFERRIN: 369 MG/DL (ref 200–360)
WBC # BLD AUTO: 10.2 X10(3) UL (ref 4–13)

## 2017-03-04 PROCEDURE — 99232 SBSQ HOSP IP/OBS MODERATE 35: CPT | Performed by: HOSPITALIST

## 2017-03-04 PROCEDURE — 93306 TTE W/DOPPLER COMPLETE: CPT | Performed by: INTERNAL MEDICINE

## 2017-03-04 PROCEDURE — 93306 TTE W/DOPPLER COMPLETE: CPT

## 2017-03-04 PROCEDURE — 99222 1ST HOSP IP/OBS MODERATE 55: CPT | Performed by: INTERNAL MEDICINE

## 2017-03-04 RX ORDER — POTASSIUM CHLORIDE 20 MEQ/1
40 TABLET, EXTENDED RELEASE ORAL EVERY 4 HOURS
Status: COMPLETED | OUTPATIENT
Start: 2017-03-04 | End: 2017-03-04

## 2017-03-04 RX ORDER — MELATONIN
325 2 TIMES DAILY WITH MEALS
Status: DISCONTINUED | OUTPATIENT
Start: 2017-03-04 | End: 2017-03-05

## 2017-03-04 NOTE — PROGRESS NOTES
Dr. Ignacio Sharif paged regarding blood sugar of 359. Waiting return page. Will continue to monitor. 2230: Spoke with Dr. Ignacio Sharif regarding above. Orders received to give 12 units of insulin tonight x1. See orders. Will implement.  Will continue to [facility]

## 2017-03-04 NOTE — PROGRESS NOTES
.5  HOLD HEPARIN FOR  60MIN AND RESTART , DECREASE 200U/HR , NEXT PTT IN 6HRS . UPDATED WITH PT AND FAMILY .  LT ARM SWOLLEN AND ELEVATED WITH PILLOW , U/S B/L ARM ORDERED BY MD . WILL DO TOMORROW MORNING , DISCUSSED WITH U/S [facility]

## 2017-03-04 NOTE — CONSULTS
[facility]  Report of Consultation    Jena Ang Patient Status:  Inpatient    1940 MRN [id number]   [facility] 3NW-A Attending Madeleine Patton, [address] [facility] Day # 1 PCP Rancho Sanchez MD     Reason for Consultation:    SVC occlus Diabetic neuropathy ([facility]    • Hypothyroidism    • Personal history of antineoplastic chemotherapy      OVER 20 YRS AGO   • Exposure to radiation    • Cancer ([facility] 1998     left breast cancer in 1998 with chemo and radiation.    • Breast CA ([facility] 1998     l Oral, resp. rate 18, height 1.575 m (5' 2\"), weight 74.889 kg (165 lb 1.6 oz), SpO2 96 %. General: No acute distress. Alert and oriented x 3. HEENT: Moist mucous membranes. EOM-I. PERRL  Neck: No lymphadenopathy. No JVD.    Respiratory: Clear to auscult 2 image 39, the superior vena cava appears occluded. This has a similar appearance since 4/2/13.  There is a prominent collateral connecting the right internal jugular vein/superior aspect of vena cava to the inferior vena cava via   an enlarged right inter appears to be chronic. In the absence of thrombosis, anticoagulation is not thought to be of benefit. If she remains symptomatic from the occlusion, she may wish to pursue a vascular surgery evaluation.   Patient is a poor historian and I was unable to co

## 2017-03-04 NOTE — PROGRESS NOTES
Dr. Candice Sun paged regarding elevated D-Dimer. No new orders received. Will see patient tomorrow. Will continue to monitor.

## 2017-03-04 NOTE — PROGRESS NOTES
TR HOSPITAL[facility]  Progress Note     Brianspencer Lopez Patient Status:  Inpatient    1940 MRN [id number]   [facility] 3NW-A Attending Charlotte Oliver, [address] [facility] Day # 1 PCP Syd Bourgeois MD     Chief Complaint: facial/arm swelling    S: Sertraline HCl  100 mg Oral Daily   • insulin aspart  2-10 Units Subcutaneous TID CC and HS       ASSESSMENT / PLAN:     1. SVC syndrome - chronic  1. AC started with heparin gtt  2. Pending venous doppler B/L UE to determine if AC to be continued  3.  Heme

## 2017-03-04 NOTE — CONSULTS
HISTORY OF PRESENT ILLNESS:  The patient is a 77year-old female with history of breast cancer requiring port placement admitted after Dr. Daija Ronquillo attempted venous access for frequent IV access for ECT treatment.   On clinical exam she was diagnosed with SVC the upper chest.  SKIN:  No rashes. NEUROLOGIC:  She is alert and oriented with normal affect. LABORATORY DATA:    No labs    CT chest: 1. The mid/inferior aspect of the superior vena cava appears occluded.  This has a similar appearance to the chest CT

## 2017-03-04 NOTE — PROGRESS NOTES
Dr. Cecy Raza on unit to see patient. States she spoke with on call doctor for oncology (Dr. Felicita Severe) regarding consult and orders. See orders. Orders for heparin drip to be started tonight with no bolus to be given. Informed MD of patient being a DNR.  Malissa nguyen

## 2017-03-04 NOTE — CM/SW NOTE
03/04/17 1000   CM/SW Referral Data   Referral Source Physician;Social Work (self-referral)   Reason for Referral Discharge planning   Informant Patient   Pertinent Medical Hx   Primary Care Physician Name Kalli Sullivan   Patient Info   Patient's Mental Status

## 2017-03-04 NOTE — PROGRESS NOTES
NURSING ADMISSION NOTE      Patient admitted via Bed  Oriented to room. Safety precautions initiated. Bed in low position. Call light in reach.

## 2017-03-04 NOTE — RESPIRATORY THERAPY NOTE
Patient just got CPAP at home about 3 weeks ago and she is not using it at home and does not want to ware one here.

## 2017-03-04 NOTE — PROGRESS NOTES
[facility] Progress Note  Subjective:  No pain or dyspnea, left arm edematous    Objective:  115/56 afebrile  SR rare PVC  I/O equal    BUN/cr 35/1.61  Hgb 9.8  LDL 49    Neuro:awake/alert  HEENT:noJVD, moist mucosa  Cardiac:S1 S2 regular  Iris

## 2017-03-04 NOTE — PROGRESS NOTES
[facility]  Progress Note    Elsa Phoenix Patient Status:  Inpatient    1940 MRN [id number]   [facility] 3NW-A Attending Kezia Casiano, [address] [facility] Day # 1 PCP Ita Mcgregor MD     Subjective:  No new complaints, incisional pain of lower extremity     Benign neoplasm of colon     Type II diabetes mellitus, uncontrolled ([address]     Essential hypertension     Malignant neoplasm of breast (female), unspecified site     Simple schizophrenia, chronic condition ([address]     Arm edema     Can counseling the patient on the above listed diagnoses and treatment options.     Tracy Constantino  3/4/2017  8:59 AM

## 2017-03-05 ENCOUNTER — APPOINTMENT (OUTPATIENT)
Dept: ULTRASOUND IMAGING | Facility: HOSPITAL | Age: 77
DRG: 300 | End: 2017-03-05
Attending: INTERNAL MEDICINE
Payer: MEDICARE

## 2017-03-05 ENCOUNTER — TELEPHONE (OUTPATIENT)
Dept: HEMATOLOGY/ONCOLOGY | Facility: HOSPITAL | Age: 77
End: 2017-03-05

## 2017-03-05 LAB
APTT PPP: 142.1 SECONDS (ref 25–34)
APTT PPP: 79.1 SECONDS (ref 25–34)
BUN BLD-MCNC: 46 MG/DL (ref 8–20)
CALCIUM BLD-MCNC: 9.1 MG/DL (ref 8.3–10.3)
CHLORIDE: 102 MMOL/L (ref 101–111)
CO2: 27 MMOL/L (ref 22–32)
CREAT BLD-MCNC: 1.95 MG/DL (ref 0.55–1.02)
GLUCOSE BLD-MCNC: 126 MG/DL (ref 70–99)
GLUCOSE BLD-MCNC: 144 MG/DL (ref 65–99)
GLUCOSE BLD-MCNC: 281 MG/DL (ref 65–99)
GLUCOSE BLD-MCNC: 282 MG/DL (ref 65–99)
GLUCOSE BLD-MCNC: 320 MG/DL (ref 65–99)
GLUCOSE BLD-MCNC: 363 MG/DL (ref 65–99)
PLATELET # BLD AUTO: 208 10(3)UL (ref 150–450)
POTASSIUM SERPL-SCNC: 4.6 MMOL/L (ref 3.6–5.1)
SODIUM SERPL-SCNC: 138 MMOL/L (ref 136–144)

## 2017-03-05 PROCEDURE — 99232 SBSQ HOSP IP/OBS MODERATE 35: CPT | Performed by: INTERNAL MEDICINE

## 2017-03-05 PROCEDURE — 93970 EXTREMITY STUDY: CPT

## 2017-03-05 PROCEDURE — 99232 SBSQ HOSP IP/OBS MODERATE 35: CPT | Performed by: HOSPITALIST

## 2017-03-05 RX ORDER — MELATONIN
325
Status: DISCONTINUED | OUTPATIENT
Start: 2017-03-06 | End: 2017-03-06

## 2017-03-05 RX ORDER — SODIUM CHLORIDE 9 MG/ML
INJECTION, SOLUTION INTRAVENOUS CONTINUOUS
Status: DISCONTINUED | OUTPATIENT
Start: 2017-03-05 | End: 2017-03-06

## 2017-03-05 RX ORDER — MELATONIN
325
Qty: 30 TABLET | Refills: 4 | Status: SHIPPED | OUTPATIENT
Start: 2017-03-06 | End: 2017-05-02

## 2017-03-05 RX ORDER — HEPARIN SODIUM 5000 [USP'U]/ML
5000 INJECTION, SOLUTION INTRAVENOUS; SUBCUTANEOUS EVERY 12 HOURS SCHEDULED
Status: DISCONTINUED | OUTPATIENT
Start: 2017-03-05 | End: 2017-03-06

## 2017-03-05 NOTE — PROGRESS NOTES
[facility] HOSPITAL[facility]  Progress Note     Luis Miguelsung Antonio Patient Status:  Inpatient    1940 MRN [id number]   [facility] 3NW-A Attending Anita Guajardo, [address] [facility] Day # 2 PCP Ray Rios MD     Chief Complaint: facial/arm swelling    S: HCl  100 mg Oral BID   • insulin detemir  15 Units Subcutaneous Nightly   • insulin detemir  30 Units Subcutaneous Daily   • Metoprolol Succinate ER  25 mg Oral Daily Beta Blocker   • OLANZapine  20 mg Oral Nightly   • pregabalin  75 mg Oral BID   • Eleonora Overton

## 2017-03-05 NOTE — PROGRESS NOTES
[facility]  Progress Note    Brian Lopez Patient Status:  Inpatient    1940 MRN [id number]   [facility] 3NW-A Attending Charlotte Oliver, [address] [facility] Day # 2 PCP Syd Bourgeois MD       SUBJECTIVE:    No new events.   Patient appear rate and rhythm. Abdomen: Soft, non tender with good bowel sounds. Extremities: No edema. Neurological: Grossly intact.      RADIOLOGY:     PROCEDURE:  BILATERAL VENOUS BLOOD FLOW OF THE ARMS      COMPARISON:  US KIMBERLI VENOUS DOPPLER ARM BILAT - [id number]    3/5/2017  11:14 AM

## 2017-03-05 NOTE — PLAN OF CARE
ANXIETY    • Will report anxiety at manageable levels Adequate for Discharge        BEHAVIOR    • Pt/Family maintain appropriate behavior and adhere to behavioral management agreement, if implemented Adequate for Discharge        CONFUSION    • Confusion, MAR.

## 2017-03-05 NOTE — PHYSICAL THERAPY NOTE
PHYSICAL THERAPY EVALUATION - INPATIENT     Room Number: 424/154-V  Evaluation Date: 3/5/2017  Type of Evaluation: Initial  Physician Order: PT Eval and Treat    Presenting Problem: Superior vena cava syndrome, now s/p 3/3/17  Reason for Therapy: Jair Miranda Personal history of antineoplastic chemotherapy      OVER 20 YRS AGO   • Exposure to radiation    • Cancer ([facility] [address] 1998     left breast cancer in 1998 with chemo and radiation.    • Breast CA ([facility] [address] 1998     left breast cancer with mastectomy, chemo and radiat precautions    RANGE OF MOTION AND STRENGTH ASSESSMENT  Upper extremity ROM and strength are within functional limits     Lower extremity ROM is within functional limits    Lower extremity strength is: BLE hip flex 4-/5, quad 4-/5, ankle 4/5    BALANCE  St it\", requiring CGA only and intermittently 1-2 UE support. Pt sit/stand c min A and HHAx1. Pt then reached for IV pole and requested to hold onto it. Pt amb 8' using IV pole and min A to the [facility] c slow deliberate pattern and shuffled gait.  Pt reported fee Potential : Good  Frequency (Obs): 5x/week  Number of Visits to Meet Established Goals: 5      CURRENT GOALS    Goal #1 Patient is able to demonstrate supine - sit EOB @ level: modified independent     Goal #2 Patient is able to demonstrate transfers Sit t

## 2017-03-05 NOTE — CM/SW NOTE
Informed by RN that pt is medically cleared for discharge back to [facility] today. Dtr can transport or pt will need [facility]. BOWEN spoke with Capo Decker at [facility]. Pt is accepted back with a 3PM d/c time. [facility] referral sent.

## 2017-03-05 NOTE — PROGRESS NOTES
[facility] Progress Note  Subjective:  No pain or dyspnea, left arm less edematous    Objective:  129/67   Afebrile  67  I/O + 278     BUN/cr 35/1.95     Neuro:awake/alert  HEENT:noJVD, moist mucosa  Cardiac:S1 S2 regular  Lungs: clear  Abdome

## 2017-03-06 VITALS
HEIGHT: 62 IN | OXYGEN SATURATION: 96 % | WEIGHT: 173.19 LBS | BODY MASS INDEX: 31.87 KG/M2 | TEMPERATURE: 98 F | RESPIRATION RATE: 18 BRPM | HEART RATE: 73 BPM | DIASTOLIC BLOOD PRESSURE: 63 MMHG | SYSTOLIC BLOOD PRESSURE: 127 MMHG

## 2017-03-06 LAB
BUN BLD-MCNC: 35 MG/DL (ref 8–20)
CALCIUM BLD-MCNC: 8.5 MG/DL (ref 8.3–10.3)
CHLORIDE: 105 MMOL/L (ref 101–111)
CO2: 23 MMOL/L (ref 22–32)
CREAT BLD-MCNC: 1.16 MG/DL (ref 0.55–1.02)
GLUCOSE BLD-MCNC: 101 MG/DL (ref 65–99)
GLUCOSE BLD-MCNC: 254 MG/DL (ref 65–99)
GLUCOSE BLD-MCNC: 80 MG/DL (ref 70–99)
POTASSIUM SERPL-SCNC: 4.7 MMOL/L (ref 3.6–5.1)
SODIUM SERPL-SCNC: 138 MMOL/L (ref 136–144)

## 2017-03-06 PROCEDURE — 99239 HOSP IP/OBS DSCHRG MGMT >30: CPT | Performed by: HOSPITALIST

## 2017-03-06 RX ORDER — FUROSEMIDE 40 MG/1
40 TABLET ORAL DAILY
Qty: 30 TABLET | Refills: 0 | Status: SHIPPED | OUTPATIENT
Start: 2017-03-06 | End: 2017-04-05

## 2017-03-06 NOTE — DISCHARGE SUMMARY
[facility] HOSPITALIST  DISCHARGE SUMMARY     Erum Turcios Patient Status:  Inpatient    1940 MRN [id number]   [facility] 3NW-A Attending Katelyn Navarro, [address] [facility] Day # 3 PCP Kaitlin Jara MD     Date of Admission: 3/3/2017  Date of Tonio Douglass Heme/onc consulted. Venous doppler of B/L UE negative for DVT. No systemic anticoagulation recommended per heme. Patient seen by cardiology and echo ordered which showed preserved LVEF. Patient given IVF for YANIRA with resolution.  Patient discharged in good Inject into the skin 3 (three) times daily before meals.  DM<150=0 units -200=2 units; -250= 4 units; -300=6 units;  -350= 8 units; -400=10 units; please call MD for BG <70 and >400    Refills:  0       HydrALAZINE HCl 100 MG T every morning before breakfast.    Refills:  0       ondansetron 4 MG Tbdp   Commonly known as:  ZOFRAN-ODT        Take 4 mg by mouth every 8 (eight) hours as needed for Nausea.     Refills:  0       Pravastatin Sodium 40 MG Tabs   Commonly known as:  PRAVA

## 2017-03-06 NOTE — PLAN OF CARE
ANXIETY    • Will report anxiety at manageable levels Adequate for Discharge        BEHAVIOR    • Pt/Family maintain appropriate behavior and adhere to behavioral management agreement, if implemented Adequate for Discharge        CONFUSION    • Confusion,

## 2017-03-06 NOTE — PLAN OF CARE
Will report anxiety at manageable levels Progressing      Pt/Family maintain appropriate behavior and adhere to behavioral management agreement, if implemented Progressing      Confusion, delirium, dementia or psychosis is improved or at baseline Progressi

## 2017-03-06 NOTE — CM/SW NOTE
MSW called by RN to set up d/c. MSW called [facility]. Dtr Dana eVrdin called, Pt to transport via [address] patient/family notified transport is not covered by insurance. Pt/family are agreeable to the charges (Patient has Medicaid).     CTS form completed an

## 2017-03-07 NOTE — CM/SW NOTE
03/03/17 2019   Discharge disposition   Discharged to: Skilled Nurs   Patient assessed for rehabilitation services?  Yes   Discharge transportation [facility]

## 2017-03-14 ENCOUNTER — TELEPHONE (OUTPATIENT)
Dept: INTERNAL MEDICINE CLINIC | Facility: CLINIC | Age: 77
End: 2017-03-14

## 2017-03-23 ENCOUNTER — TELEPHONE (OUTPATIENT)
Dept: SURGERY | Facility: CLINIC | Age: 77
End: 2017-03-23

## 2017-03-23 NOTE — TELEPHONE ENCOUNTER
Slime Banda, nursing home [facility] in [place] called. Pt had port inserted 3/3/17 by Dr. Kalia Gonzalez. Would like to know if there are any orders for this ei: flush, port access etc. Please fax to [phone] ATTN: Dennise Milan.  Any questions- [phone]

## 2017-03-29 ENCOUNTER — TELEPHONE (OUTPATIENT)
Dept: SURGERY | Facility: CLINIC | Age: 77
End: 2017-03-29

## 2017-03-29 DIAGNOSIS — E46 MALNUTRITION (HCC): Primary | ICD-10-CM

## 2017-03-29 NOTE — PROGRESS NOTES
[facility] / [facility]  ECT Procedure Note    Lelia Cordero Patient Status:  Outpatient   Age/Gender 77year old female MRN [id number]   Location [address] [facility] Attending Orlando Del Angel MD   Hosp Day # 0 PCP No

## 2017-03-29 NOTE — OR NURSING
Emilie RN from Interventional Radiology called. She spoke with Radiologist Dr. Windy Galdamez.  Because of the patients questionable allergy to Iodine and precautions taken in the past prior to procedures containing contrast,Port a Cath access study unable to be done

## 2017-03-29 NOTE — PROGRESS NOTES
[facility] / [facility]  ECT History & Physical    Angel Seb Patient Status:  Outpatient   Age/Gender 77year old female MRN [id number]   Location [address] [facility] Attending Nick Werner MD   Hosp Day # 0 PCP No prim adenoma    ECT PROVIDED Bilateral 11/2014    Comment ! st Tx. 11/2014    CHEMOTHERAPY Left 1998    Comment left breast cancer with mastectomy, chemo and radiation.     RADIATION LEFT Left 1998    Comment left breast cancer with mastectomy, chemo and radiatio

## 2017-03-30 NOTE — OR NURSING
Spoke with Edison Marquez at [facility] and she reviewed medication list. RN was given information that procedure is to be done under local anesthesia and that pt may eat drink and medications as normal for tomorrow.  Daughter Laly Page will be providing

## 2017-03-31 ENCOUNTER — APPOINTMENT (OUTPATIENT)
Dept: GENERAL RADIOLOGY | Facility: HOSPITAL | Age: 77
End: 2017-03-31
Attending: COLON & RECTAL SURGERY
Payer: MEDICARE

## 2017-03-31 ENCOUNTER — APPOINTMENT (OUTPATIENT)
Dept: INTERVENTIONAL RADIOLOGY/VASCULAR | Facility: HOSPITAL | Age: 77
End: 2017-03-31
Attending: COLON & RECTAL SURGERY
Payer: MEDICARE

## 2017-03-31 ENCOUNTER — HOSPITAL ENCOUNTER (OUTPATIENT)
Facility: HOSPITAL | Age: 77
Setting detail: HOSPITAL OUTPATIENT SURGERY
Discharge: HOME | End: 2017-03-31
Attending: COLON & RECTAL SURGERY | Admitting: COLON & RECTAL SURGERY
Payer: MEDICARE

## 2017-03-31 ENCOUNTER — SURGERY (OUTPATIENT)
Age: 77
End: 2017-03-31

## 2017-03-31 VITALS
HEIGHT: 62 IN | SYSTOLIC BLOOD PRESSURE: 129 MMHG | HEART RATE: 88 BPM | RESPIRATION RATE: 20 BRPM | WEIGHT: 168 LBS | DIASTOLIC BLOOD PRESSURE: 67 MMHG | TEMPERATURE: 98 F | BODY MASS INDEX: 30.91 KG/M2 | OXYGEN SATURATION: 98 %

## 2017-03-31 DIAGNOSIS — E46 MALNUTRITION (HCC): ICD-10-CM

## 2017-03-31 PROCEDURE — 36598 INJ W/FLUOR EVAL CV DEVICE: CPT

## 2017-03-31 PROCEDURE — A4216 STERILE WATER/SALINE, 10 ML: HCPCS | Performed by: COLON & RECTAL SURGERY

## 2017-03-31 PROCEDURE — 82962 GLUCOSE BLOOD TEST: CPT

## 2017-03-31 PROCEDURE — 0JWT0XZ REVISION OF TUNNELED VASCULAR ACCESS DEVICE IN TRUNK SUBCUTANEOUS TISSUE AND FASCIA, OPEN APPROACH: ICD-10-PCS | Performed by: COLON & RECTAL SURGERY

## 2017-03-31 PROCEDURE — 36593 DECLOT VASCULAR DEVICE: CPT

## 2017-03-31 PROCEDURE — 76000 FLUOROSCOPY <1 HR PHYS/QHP: CPT

## 2017-03-31 RX ORDER — LIDOCAINE HYDROCHLORIDE 10 MG/ML
INJECTION, SOLUTION INFILTRATION; PERINEURAL AS NEEDED
Status: DISCONTINUED | OUTPATIENT
Start: 2017-03-31 | End: 2017-03-31 | Stop reason: HOSPADM

## 2017-03-31 RX ORDER — BUPIVACAINE HYDROCHLORIDE AND EPINEPHRINE 5; 5 MG/ML; UG/ML
INJECTION, SOLUTION EPIDURAL; INTRACAUDAL; PERINEURAL AS NEEDED
Status: DISCONTINUED | OUTPATIENT
Start: 2017-03-31 | End: 2017-03-31 | Stop reason: HOSPADM

## 2017-03-31 NOTE — OPERATIVE REPORT
[facility]  Operative Note    Ana Maria Goldstein Location: OR   CSN [id number] MRN [id number]   Admission Date 3/31/2017 Operation Date 3/31/2017   Attending Physician Dawn Heller MD Operating Physician Tracy Constantino MD     Pre-Operative Diagnosis:  Tally Bidding and sodium bicarbonate. I cut down to the [place] itself and found to be faced up. I accessed the port with a Carr needle. I was able to infuse easily into the vein, but I was unable to aspirate any blood back.     And then cut down on the femoral swe

## 2017-03-31 NOTE — OR NURSING
UNABLE TO WITHDRAWAL BLOOD FROM PAC. DR BOSWELL ORDERS RECEIVED. MEDICATION INFUSED THROUGH PAC. AT 1800. CLAMPED IV.

## 2017-03-31 NOTE — H&P
History & Physical Examination    Patient Name: Jena Ang  MRN: [id number]  CSN: [id number]  YOB: 1940    Diagnosis: Here for central venous line access    Present Illness: Power Port not functional, migrated and flipped.        No presc chemotherapy      OVER 20 YRS AGO   • Exposure to radiation    • Cancer ([facility] [address] 1998     left breast cancer in 1998 with chemo and radiation. • Breast CA ([facility] [address] 1998     left breast cancer with mastectomy, chemo and radiation.    • Sleep apnea    • Deep vein

## 2017-03-31 NOTE — OR PREOP
Pt arrived in Pre-op via w/c with volunteer. Volunteer states daughter left to run errands. Pt oriented to name only, when asked she did not know why she is here. States daughter told her it was a physical, and she lied to her. Daughter Mandy Lechuga has DPOA.  Pt

## 2017-04-01 NOTE — OR NURSING
NO BLOOD RETURN NOTED . SMALL  AMOUNT DRAINAGE NOTED UNDER.  [facility] NOTIFIED AND HERE TO SEE PATIENT

## 2017-04-03 NOTE — PROGRESS NOTES
Quick Note:    I called the number on file, pt is in a nursing home and her daughter, who is her care taker picked up the called. I called the patient and notified them of the results.  Pt's daughter said she will have a doctor do an exam at nursing home, a

## 2017-04-26 ENCOUNTER — TELEPHONE (OUTPATIENT)
Dept: SURGERY | Facility: CLINIC | Age: 77
End: 2017-04-26

## 2017-04-26 NOTE — PROGRESS NOTES
[facility] / [facility]  ECT History & Physical    Maine Kaylee Patient Status:  Outpatient   Age/Gender 77year old female MRN [id number]   Location [address] [facility] Attending Makenna Bee MD   Hosp Day # 0 PCP No prim breast cancer with mastectomy, chemo and radiation. RADIATION LEFT Left 1998    Comment left breast cancer with mastectomy, chemo and radiation. MASTECTOMY LEFT Left 1998    Comment left breast cancer with mastectomy, chemo and radiation.     OTHER  3

## 2017-04-26 NOTE — TELEPHONE ENCOUNTER
Pt to have IR Procedure to check Port.  Needs pre-medication for allergy to dye - spoke with Nicholas Hughes RN at [facility] and ordered Prednisone 50mg PO 13 hrs, 7hrs, and 1hr prior to infusion of contrast, in addition Benadryl 50mg PO 1 hr prior to infusion of contr

## 2017-04-26 NOTE — PROGRESS NOTES
[facility] / [facility]  ECT Procedure Note    Jena Ashia Patient Status:  Outpatient   Age/Gender 77year old female MRN [id number]   Location [address] [facility] Attending Ryan Hawkins MD   Hosp Day # 0 PCP No

## 2017-04-26 NOTE — OR NURSING
Spoke with Chely Rojas patient's daughter regarding port. Patient will need to have an appt scheduled through IR per Dr Berny Naqvi to assess function of port. Daughter verbalized understanding.

## 2017-05-01 DIAGNOSIS — I87.8 POOR VENOUS ACCESS: Primary | ICD-10-CM

## 2017-05-02 RX ORDER — FERROUS SULFATE TAB EC 324 MG (65 MG FE EQUIVALENT) 324 (65 FE) MG
1 TABLET DELAYED RESPONSE ORAL DAILY
COMMUNITY

## 2017-05-02 NOTE — PROGRESS NOTES
Following instructions faxed to [facility] for day of port cath procedure on 5/10. Arrive at 0730. (daughter will bring patient) Procedure time is 0900. Fast after midnight. Hold metformin 24 hours prior to procedure. (last dose 5/9 am).   Hold humalo

## 2017-05-02 NOTE — PROGRESS NOTES
NH pre-instructions continued. Give prednisone 50 mg oral x 3 doses prior to procedure. One at dinner , HS night before and in am prior to procedure.

## 2017-05-09 VITALS — HEIGHT: 62 IN

## 2017-05-09 RX ORDER — SODIUM CHLORIDE, SODIUM LACTATE, POTASSIUM CHLORIDE, CALCIUM CHLORIDE 600; 310; 30; 20 MG/100ML; MG/100ML; MG/100ML; MG/100ML
INJECTION, SOLUTION INTRAVENOUS CONTINUOUS
Status: CANCELLED | OUTPATIENT
Start: 2017-05-09

## 2017-05-09 RX ORDER — DEXTROSE MONOHYDRATE 25 G/50ML
50 INJECTION, SOLUTION INTRAVENOUS
Status: CANCELLED | OUTPATIENT
Start: 2017-05-09

## 2017-05-10 ENCOUNTER — HOSPITAL ENCOUNTER (OUTPATIENT)
Dept: INTERVENTIONAL RADIOLOGY/VASCULAR | Facility: HOSPITAL | Age: 77
Discharge: NURSING, REHAB OR LONG-TERM CARE | End: 2017-05-10
Attending: COLON & RECTAL SURGERY | Admitting: COLON & RECTAL SURGERY
Payer: MEDICARE

## 2017-05-10 VITALS
RESPIRATION RATE: 16 BRPM | TEMPERATURE: 98 F | HEIGHT: 62 IN | DIASTOLIC BLOOD PRESSURE: 79 MMHG | SYSTOLIC BLOOD PRESSURE: 154 MMHG | BODY MASS INDEX: 30.91 KG/M2 | WEIGHT: 168 LBS | HEART RATE: 96 BPM | OXYGEN SATURATION: 92 %

## 2017-05-10 DIAGNOSIS — I87.8 POOR VENOUS ACCESS: ICD-10-CM

## 2017-05-10 PROCEDURE — 36595 MECH REMOV TUNNELED CV CATH: CPT

## 2017-05-10 PROCEDURE — B51CZZA FLUOROSCOPY OF LEFT LOWER EXTREMITY VEINS, GUIDANCE: ICD-10-PCS | Performed by: RADIOLOGY

## 2017-05-10 PROCEDURE — 3C1ZX8Z IRRIGATION OF INDWELLING DEVICE USING IRRIGATING SUBSTANCE, EXTERNAL APPROACH: ICD-10-PCS | Performed by: RADIOLOGY

## 2017-05-10 PROCEDURE — 82962 GLUCOSE BLOOD TEST: CPT

## 2017-05-10 PROCEDURE — 3E03317 INTRODUCTION OF OTHER THROMBOLYTIC INTO PERIPHERAL VEIN, PERCUTANEOUS APPROACH: ICD-10-PCS | Performed by: RADIOLOGY

## 2017-05-10 PROCEDURE — 75901 REMOVE CVA DEVICE OBSTRUCT: CPT

## 2017-05-10 PROCEDURE — A4216 STERILE WATER/SALINE, 10 ML: HCPCS

## 2017-05-10 RX ORDER — HEPARIN SODIUM 5000 [USP'U]/ML
INJECTION, SOLUTION INTRAVENOUS; SUBCUTANEOUS
Status: COMPLETED
Start: 2017-05-10 | End: 2017-05-10

## 2017-05-10 RX ORDER — MIDAZOLAM HYDROCHLORIDE 1 MG/ML
INJECTION INTRAMUSCULAR; INTRAVENOUS
Status: DISCONTINUED
Start: 2017-05-10 | End: 2017-05-10 | Stop reason: WASHOUT

## 2017-05-10 RX ORDER — LIDOCAINE HYDROCHLORIDE 10 MG/ML
INJECTION, SOLUTION INFILTRATION; PERINEURAL
Status: COMPLETED
Start: 2017-05-10 | End: 2017-05-10

## 2017-05-10 RX ORDER — SODIUM CHLORIDE 9 MG/ML
INJECTION, SOLUTION INTRAVENOUS CONTINUOUS
Status: DISCONTINUED | OUTPATIENT
Start: 2017-05-10 | End: 2017-05-10

## 2017-05-10 RX ORDER — DIPHENHYDRAMINE HYDROCHLORIDE 50 MG/ML
INJECTION INTRAMUSCULAR; INTRAVENOUS
Status: DISCONTINUED
Start: 2017-05-10 | End: 2017-05-10 | Stop reason: WASHOUT

## 2017-05-10 RX ADMIN — SODIUM CHLORIDE: 9 INJECTION, SOLUTION INTRAVENOUS at 09:30:00

## 2017-05-10 NOTE — PROGRESS NOTES
Rc'd pt from IR in stable condition. VSS. Dressing to right groin is clean and dry, soft, no bleeding or hematoma. Pt denies c/o pain or discomfort. See flow sheet. After bedrest completed, pt ambulated and tolerated well. Groin stable.  Reviewed d/c instru

## 2017-05-10 NOTE — PROCEDURES
[facility]  Procedure Note    Amie Dobbs Patient Status:  Outpatient in a Bed    1940 MRN [id number]   [facility] Kavitha Sears MD   Hosp Day # 0 PCP No primary care provider on file.      Pro

## 2017-05-23 RX ORDER — SODIUM CHLORIDE, SODIUM LACTATE, POTASSIUM CHLORIDE, CALCIUM CHLORIDE 600; 310; 30; 20 MG/100ML; MG/100ML; MG/100ML; MG/100ML
INJECTION, SOLUTION INTRAVENOUS CONTINUOUS
Status: CANCELLED | OUTPATIENT
Start: 2017-05-23

## 2017-05-23 RX ORDER — GLYCOPYRROLATE 0.2 MG/ML
0.2 INJECTION, SOLUTION INTRAMUSCULAR; INTRAVENOUS ONCE
Status: CANCELLED | OUTPATIENT
Start: 2017-05-23 | End: 2017-05-23

## 2017-05-24 ENCOUNTER — HOSPITAL ENCOUNTER (OUTPATIENT)
Dept: POSTOP/PACU | Facility: HOSPITAL | Age: 77
Discharge: HOME | End: 2017-05-24
Attending: Other

## 2017-06-07 NOTE — PROGRESS NOTES
[facility] / [facility]  ECT History & Physical    Jena Ashia Patient Status:  Outpatient   Age/Gender 77year old female MRN [id number]   Location [address] [facility] Attending Ryan Hawkins MD   Hosp Day # 0 PCP No prim 1998    Comment left breast cancer with mastectomy, chemo and radiation. RADIATION LEFT Left 1998    Comment left breast cancer with mastectomy, chemo and radiation.     MASTECTOMY LEFT Left 1998    Comment left breast cancer with mastectomy, chemo and r

## 2017-06-07 NOTE — PROGRESS NOTES
[facility] / [facility]  ECT Procedure Note    Maine Page Patient Status:  Outpatient   Age/Gender 77year old female MRN [id number]   Location [address] [facility] Attending Makenna Bee MD   Hosp Day # 0 PCP No

## 2017-07-05 ENCOUNTER — HOSPITAL ENCOUNTER (OUTPATIENT)
Dept: POSTOP/PACU | Facility: HOSPITAL | Age: 77
Discharge: HOME | End: 2017-07-05
Attending: Other
Payer: MEDICARE

## 2017-07-05 VITALS
TEMPERATURE: 98 F | HEIGHT: 62 IN | DIASTOLIC BLOOD PRESSURE: 83 MMHG | OXYGEN SATURATION: 97 % | RESPIRATION RATE: 16 BRPM | HEART RATE: 81 BPM | SYSTOLIC BLOOD PRESSURE: 148 MMHG

## 2017-07-05 DIAGNOSIS — F31.64 BIPOLAR I DISORDER, MOST RECENT EPISODE MIXED, SEVERE WITH PSYCHOTIC FEATURES (HCC): ICD-10-CM

## 2017-07-05 DIAGNOSIS — F31.64 SEVERE BIPOLAR I DISORDER, MOST RECENT EPISODE MIXED, WITH PSYCHOTIC FEATURES (HCC): ICD-10-CM

## 2017-07-05 LAB
GLUCOSE BLD-MCNC: 127 MG/DL (ref 65–99)
GLUCOSE BLD-MCNC: 140 MG/DL (ref 65–99)

## 2017-07-05 RX ORDER — SODIUM CHLORIDE, SODIUM LACTATE, POTASSIUM CHLORIDE, CALCIUM CHLORIDE 600; 310; 30; 20 MG/100ML; MG/100ML; MG/100ML; MG/100ML
INJECTION, SOLUTION INTRAVENOUS CONTINUOUS
Status: DISCONTINUED | OUTPATIENT
Start: 2017-07-05 | End: 2017-07-09

## 2017-07-05 RX ORDER — ACETAMINOPHEN 500 MG
1000 TABLET ORAL ONCE AS NEEDED
Status: ACTIVE | OUTPATIENT
Start: 2017-07-05 | End: 2017-07-05

## 2017-07-05 RX ORDER — NALOXONE HYDROCHLORIDE 0.4 MG/ML
80 INJECTION, SOLUTION INTRAMUSCULAR; INTRAVENOUS; SUBCUTANEOUS AS NEEDED
Status: ACTIVE | OUTPATIENT
Start: 2017-07-05 | End: 2017-07-05

## 2017-07-05 RX ORDER — MIDAZOLAM HYDROCHLORIDE 1 MG/ML
1 INJECTION INTRAMUSCULAR; INTRAVENOUS EVERY 5 MIN PRN
Status: ACTIVE | OUTPATIENT
Start: 2017-07-05 | End: 2017-07-05

## 2017-07-05 RX ORDER — HYDROMORPHONE HYDROCHLORIDE 1 MG/ML
0.4 INJECTION, SOLUTION INTRAMUSCULAR; INTRAVENOUS; SUBCUTANEOUS EVERY 5 MIN PRN
Status: ACTIVE | OUTPATIENT
Start: 2017-07-05 | End: 2017-07-05

## 2017-07-05 RX ORDER — DEXTROSE MONOHYDRATE 25 G/50ML
50 INJECTION, SOLUTION INTRAVENOUS
Status: DISCONTINUED | OUTPATIENT
Start: 2017-07-05 | End: 2017-07-09

## 2017-07-05 RX ORDER — GLYCOPYRROLATE 0.2 MG/ML
0.2 INJECTION, SOLUTION INTRAMUSCULAR; INTRAVENOUS ONCE
Status: COMPLETED | OUTPATIENT
Start: 2017-07-05 | End: 2017-07-05

## 2017-07-05 RX ORDER — GLYCOPYRROLATE 0.2 MG/ML
INJECTION, SOLUTION INTRAMUSCULAR; INTRAVENOUS
Status: COMPLETED
Start: 2017-07-05 | End: 2017-07-05

## 2017-07-05 RX ADMIN — GLYCOPYRROLATE 0.2 MG: 0.2 INJECTION, SOLUTION INTRAMUSCULAR; INTRAVENOUS at 06:57:00

## 2017-07-05 RX ADMIN — SODIUM CHLORIDE, SODIUM LACTATE, POTASSIUM CHLORIDE, CALCIUM CHLORIDE: 600; 310; 30; 20 INJECTION, SOLUTION INTRAVENOUS at 06:45:00

## 2017-07-05 NOTE — PROGRESS NOTES
[facility] / [facility]  ECT Procedure Note    Katarzyna Party Patient Status:  Outpatient   Age/Gender 69year old female MRN [id number]   Location [address] [facility] Attending Leah Felton MD   Hosp Day # 0 PCP No

## 2017-08-02 ENCOUNTER — HOSPITAL ENCOUNTER (OUTPATIENT)
Dept: POSTOP/PACU | Facility: HOSPITAL | Age: 77
Discharge: HOME | End: 2017-08-02
Attending: Other
Payer: MEDICARE

## 2017-08-02 VITALS
SYSTOLIC BLOOD PRESSURE: 155 MMHG | TEMPERATURE: 98 F | HEART RATE: 77 BPM | DIASTOLIC BLOOD PRESSURE: 68 MMHG | HEIGHT: 62 IN | OXYGEN SATURATION: 92 % | RESPIRATION RATE: 16 BRPM

## 2017-08-02 DIAGNOSIS — F31.64 SEVERE BIPOLAR I DISORDER, MOST RECENT EPISODE MIXED, WITH PSYCHOTIC FEATURES (HCC): ICD-10-CM

## 2017-08-02 DIAGNOSIS — F31.64 BIPOLAR I DISORDER, MOST RECENT EPISODE MIXED, SEVERE WITH PSYCHOTIC FEATURES (HCC): ICD-10-CM

## 2017-08-02 LAB
GLUCOSE BLD-MCNC: 198 MG/DL (ref 65–99)
GLUCOSE BLD-MCNC: 231 MG/DL (ref 65–99)

## 2017-08-02 RX ORDER — HYDROCODONE BITARTRATE AND ACETAMINOPHEN 5; 325 MG/1; MG/1
1 TABLET ORAL AS NEEDED
Status: DISCONTINUED | OUTPATIENT
Start: 2017-08-02 | End: 2017-08-06

## 2017-08-02 RX ORDER — HYDROMORPHONE HYDROCHLORIDE 1 MG/ML
0.4 INJECTION, SOLUTION INTRAMUSCULAR; INTRAVENOUS; SUBCUTANEOUS EVERY 5 MIN PRN
Status: ACTIVE | OUTPATIENT
Start: 2017-08-02 | End: 2017-08-02

## 2017-08-02 RX ORDER — METOCLOPRAMIDE HYDROCHLORIDE 5 MG/ML
10 INJECTION INTRAMUSCULAR; INTRAVENOUS AS NEEDED
Status: ACTIVE | OUTPATIENT
Start: 2017-08-02 | End: 2017-08-02

## 2017-08-02 RX ORDER — GLYCOPYRROLATE 0.2 MG/ML
0.2 INJECTION, SOLUTION INTRAMUSCULAR; INTRAVENOUS ONCE
Status: COMPLETED | OUTPATIENT
Start: 2017-08-02 | End: 2017-08-02

## 2017-08-02 RX ORDER — INSULIN ASPART 100 [IU]/ML
6 INJECTION, SOLUTION INTRAVENOUS; SUBCUTANEOUS ONCE
Status: COMPLETED | OUTPATIENT
Start: 2017-08-02 | End: 2017-08-02

## 2017-08-02 RX ORDER — MIDAZOLAM HYDROCHLORIDE 1 MG/ML
1 INJECTION INTRAMUSCULAR; INTRAVENOUS EVERY 5 MIN PRN
Status: ACTIVE | OUTPATIENT
Start: 2017-08-02 | End: 2017-08-02

## 2017-08-02 RX ORDER — GLYCOPYRROLATE 0.2 MG/ML
INJECTION, SOLUTION INTRAMUSCULAR; INTRAVENOUS
Status: COMPLETED
Start: 2017-08-02 | End: 2017-08-02

## 2017-08-02 RX ORDER — NALOXONE HYDROCHLORIDE 0.4 MG/ML
80 INJECTION, SOLUTION INTRAMUSCULAR; INTRAVENOUS; SUBCUTANEOUS AS NEEDED
Status: ACTIVE | OUTPATIENT
Start: 2017-08-02 | End: 2017-08-02

## 2017-08-02 RX ORDER — INSULIN ASPART 100 [IU]/ML
INJECTION, SOLUTION INTRAVENOUS; SUBCUTANEOUS
Status: COMPLETED
Start: 2017-08-02 | End: 2017-08-02

## 2017-08-02 RX ORDER — DEXTROSE MONOHYDRATE 25 G/50ML
50 INJECTION, SOLUTION INTRAVENOUS
Status: DISCONTINUED | OUTPATIENT
Start: 2017-08-02 | End: 2017-08-06

## 2017-08-02 RX ORDER — SODIUM CHLORIDE, SODIUM LACTATE, POTASSIUM CHLORIDE, CALCIUM CHLORIDE 600; 310; 30; 20 MG/100ML; MG/100ML; MG/100ML; MG/100ML
INJECTION, SOLUTION INTRAVENOUS CONTINUOUS
Status: DISCONTINUED | OUTPATIENT
Start: 2017-08-02 | End: 2017-08-06

## 2017-08-02 RX ORDER — HYDROCODONE BITARTRATE AND ACETAMINOPHEN 5; 325 MG/1; MG/1
2 TABLET ORAL AS NEEDED
Status: DISCONTINUED | OUTPATIENT
Start: 2017-08-02 | End: 2017-08-06

## 2017-08-02 RX ORDER — MEPERIDINE HYDROCHLORIDE 25 MG/ML
12.5 INJECTION INTRAMUSCULAR; INTRAVENOUS; SUBCUTANEOUS AS NEEDED
Status: DISCONTINUED | OUTPATIENT
Start: 2017-08-02 | End: 2017-08-06

## 2017-08-02 RX ORDER — ONDANSETRON 2 MG/ML
4 INJECTION INTRAMUSCULAR; INTRAVENOUS AS NEEDED
Status: ACTIVE | OUTPATIENT
Start: 2017-08-02 | End: 2017-08-02

## 2017-08-02 RX ADMIN — INSULIN ASPART 6 UNITS: 100 INJECTION, SOLUTION INTRAVENOUS; SUBCUTANEOUS at 07:07:00

## 2017-08-02 RX ADMIN — GLYCOPYRROLATE 0.2 MG: 0.2 INJECTION, SOLUTION INTRAMUSCULAR; INTRAVENOUS at 07:03:00

## 2017-08-02 RX ADMIN — SODIUM CHLORIDE, SODIUM LACTATE, POTASSIUM CHLORIDE, CALCIUM CHLORIDE: 600; 310; 30; 20 INJECTION, SOLUTION INTRAVENOUS at 06:57:00

## 2017-08-02 NOTE — OR NURSING
Port in left lower abdomen discontinued per protocol. Patients daughter Efren Preston here, she was aware of the left arm swelling. States when she picked her up to come here this morning it was like that, but worse. States has improved since then.  Daughter also

## 2017-08-02 NOTE — PROGRESS NOTES
[facility] / [facility]  ECT Procedure Note    Bryce Memory Patient Status:  Outpatient   Age/Gender 69year old female MRN [id number]   Location [address] [facility] Attending Johan Beckham MD   Hosp Day # 0 PCP No

## 2017-08-09 RX ORDER — DEXTROSE MONOHYDRATE 25 G/50ML
50 INJECTION, SOLUTION INTRAVENOUS
Status: CANCELLED | OUTPATIENT
Start: 2017-08-09

## 2017-08-09 RX ORDER — SODIUM CHLORIDE, SODIUM LACTATE, POTASSIUM CHLORIDE, CALCIUM CHLORIDE 600; 310; 30; 20 MG/100ML; MG/100ML; MG/100ML; MG/100ML
INJECTION, SOLUTION INTRAVENOUS CONTINUOUS
Status: CANCELLED | OUTPATIENT
Start: 2017-08-09

## 2017-08-23 ENCOUNTER — HOSPITAL ENCOUNTER (OUTPATIENT)
Dept: POSTOP/PACU | Facility: HOSPITAL | Age: 77
Discharge: HOME | End: 2017-08-23
Attending: Other
Payer: MEDICARE

## 2017-08-23 VITALS
HEART RATE: 94 BPM | DIASTOLIC BLOOD PRESSURE: 72 MMHG | TEMPERATURE: 98 F | RESPIRATION RATE: 18 BRPM | SYSTOLIC BLOOD PRESSURE: 144 MMHG | OXYGEN SATURATION: 86 % | HEIGHT: 62 IN

## 2017-08-23 DIAGNOSIS — F31.64 SEVERE BIPOLAR I DISORDER, MOST RECENT EPISODE MIXED, WITH PSYCHOTIC FEATURES (HCC): ICD-10-CM

## 2017-08-23 DIAGNOSIS — F25.0 SCHIZOAFFECTIVE DISORDER, BIPOLAR TYPE (HCC): ICD-10-CM

## 2017-08-23 DIAGNOSIS — F31.64 BIPOLAR AFFECTIVE DISORDER, MIXED, SEVERE, WITH PSYCHOTIC BEHAVIOR (HCC): ICD-10-CM

## 2017-08-23 LAB
GLUCOSE BLD-MCNC: 190 MG/DL (ref 65–99)
GLUCOSE BLD-MCNC: 227 MG/DL (ref 65–99)

## 2017-08-23 RX ORDER — MIDAZOLAM HYDROCHLORIDE 1 MG/ML
1 INJECTION INTRAMUSCULAR; INTRAVENOUS EVERY 5 MIN PRN
Status: DISCONTINUED | OUTPATIENT
Start: 2017-08-23 | End: 2017-08-27

## 2017-08-23 RX ORDER — ONDANSETRON 2 MG/ML
4 INJECTION INTRAMUSCULAR; INTRAVENOUS AS NEEDED
Status: ACTIVE | OUTPATIENT
Start: 2017-08-23 | End: 2017-08-23

## 2017-08-23 RX ORDER — GLYCOPYRROLATE 0.2 MG/ML
INJECTION, SOLUTION INTRAMUSCULAR; INTRAVENOUS
Status: COMPLETED
Start: 2017-08-23 | End: 2017-08-23

## 2017-08-23 RX ORDER — SODIUM CHLORIDE, SODIUM LACTATE, POTASSIUM CHLORIDE, CALCIUM CHLORIDE 600; 310; 30; 20 MG/100ML; MG/100ML; MG/100ML; MG/100ML
INJECTION, SOLUTION INTRAVENOUS CONTINUOUS
Status: DISCONTINUED | OUTPATIENT
Start: 2017-08-23 | End: 2017-08-27

## 2017-08-23 RX ORDER — MIDAZOLAM HYDROCHLORIDE 1 MG/ML
INJECTION INTRAMUSCULAR; INTRAVENOUS
Status: COMPLETED
Start: 2017-08-23 | End: 2017-08-23

## 2017-08-23 RX ORDER — HYDROMORPHONE HYDROCHLORIDE 1 MG/ML
0.4 INJECTION, SOLUTION INTRAMUSCULAR; INTRAVENOUS; SUBCUTANEOUS EVERY 5 MIN PRN
Status: ACTIVE | OUTPATIENT
Start: 2017-08-23 | End: 2017-08-23

## 2017-08-23 RX ORDER — HYDROCODONE BITARTRATE AND ACETAMINOPHEN 5; 325 MG/1; MG/1
1 TABLET ORAL AS NEEDED
Status: DISCONTINUED | OUTPATIENT
Start: 2017-08-23 | End: 2017-08-27

## 2017-08-23 RX ORDER — HYDROCODONE BITARTRATE AND ACETAMINOPHEN 5; 325 MG/1; MG/1
2 TABLET ORAL AS NEEDED
Status: DISCONTINUED | OUTPATIENT
Start: 2017-08-23 | End: 2017-08-27

## 2017-08-23 RX ORDER — DEXTROSE MONOHYDRATE 25 G/50ML
50 INJECTION, SOLUTION INTRAVENOUS
Status: DISCONTINUED | OUTPATIENT
Start: 2017-08-23 | End: 2017-08-27

## 2017-08-23 RX ORDER — GLYCOPYRROLATE 0.2 MG/ML
0.2 INJECTION, SOLUTION INTRAMUSCULAR; INTRAVENOUS ONCE
Status: COMPLETED | OUTPATIENT
Start: 2017-08-23 | End: 2017-08-23

## 2017-08-23 RX ORDER — NALOXONE HYDROCHLORIDE 0.4 MG/ML
80 INJECTION, SOLUTION INTRAMUSCULAR; INTRAVENOUS; SUBCUTANEOUS AS NEEDED
Status: ACTIVE | OUTPATIENT
Start: 2017-08-23 | End: 2017-08-23

## 2017-08-23 RX ADMIN — GLYCOPYRROLATE 0.2 MG: 0.2 INJECTION, SOLUTION INTRAMUSCULAR; INTRAVENOUS at 06:43:00

## 2017-08-23 RX ADMIN — MIDAZOLAM HYDROCHLORIDE 1 MG: 1 INJECTION INTRAMUSCULAR; INTRAVENOUS at 07:51:00

## 2017-08-23 RX ADMIN — MIDAZOLAM HYDROCHLORIDE 1 MG: 1 INJECTION INTRAMUSCULAR; INTRAVENOUS at 07:56:00

## 2017-08-23 RX ADMIN — SODIUM CHLORIDE, SODIUM LACTATE, POTASSIUM CHLORIDE, CALCIUM CHLORIDE: 600; 310; 30; 20 INJECTION, SOLUTION INTRAVENOUS at 06:25:00

## 2017-08-23 NOTE — PROGRESS NOTES
[facility] / [facility]  ECT History & Physical    Jena Ashia Patient Status:  Outpatient   Age/Gender 69year old female MRN [id number]   Location [address] [facility] Attending Ryan Hawkins MD   Hosp Day # 0 PCP No pr COLONOSCOPY      Comment: tublar adenoma  11/2014: ECT PROVIDED Bilateral      Comment: multiple ECT's every 3 weeks or so last 3                years  1998: MASTECTOMY LEFT Left      Comment: left breast cancer with mastectomy, chemo and                ra Soft, non-tender, bowel sounds active all four quadrants,     no masses, no organomegaly   Extremities:   Extremities normal, atraumatic, no cyanosis or edema   Pulses:   2+ and symmetric all extremities   Skin:   Skin color, texture, turgor normal, no

## 2017-08-23 NOTE — PROGRESS NOTES
[facility] / [facility]  ECT Procedure Note    Gavin Montano Patient Status:  Outpatient   Age/Gender 69year old female MRN [id number]   Location [address] [facility] Attending Dawna Valdivia MD   Hosp Day # 0 PCP No

## 2017-09-05 NOTE — OR NURSING
Spoke to [employer] at [employer]. Requested an updated medication list and advised that patient needs Basic Metabolic panel done prior to upcoming procedure.  Faxed request

## 2017-09-12 RX ORDER — ARIPIPRAZOLE 15 MG/1
10 TABLET ORAL
COMMUNITY
End: 2018-01-05 | Stop reason: ALTCHOICE

## 2017-09-12 RX ORDER — LEVOTHYROXINE SODIUM 0.1 MG/1
100 TABLET ORAL
COMMUNITY

## 2017-09-12 RX ORDER — FUROSEMIDE 20 MG/1
20 TABLET ORAL 2 TIMES DAILY
COMMUNITY
End: 2018-01-05 | Stop reason: ALTCHOICE

## 2017-09-20 ENCOUNTER — HOSPITAL ENCOUNTER (OUTPATIENT)
Dept: POSTOP/PACU | Facility: HOSPITAL | Age: 77
Discharge: HOME | End: 2017-09-20
Attending: Other
Payer: MEDICARE

## 2017-09-20 VITALS
HEIGHT: 62 IN | RESPIRATION RATE: 22 BRPM | SYSTOLIC BLOOD PRESSURE: 171 MMHG | DIASTOLIC BLOOD PRESSURE: 83 MMHG | OXYGEN SATURATION: 98 % | HEART RATE: 83 BPM | TEMPERATURE: 98 F

## 2017-09-20 DIAGNOSIS — F31.64 BIPOLAR I, MOST RECENT EPISODE MIXED, SEVERE WITH PSYCHOTIC BEHAVIOR (HCC): ICD-10-CM

## 2017-09-20 DIAGNOSIS — F31.64 SEVERE BIPOLAR I DISORDER, MOST RECENT EPISODE MIXED, WITH PSYCHOTIC FEATURES (HCC): ICD-10-CM

## 2017-09-20 LAB
GLUCOSE BLD-MCNC: 155 MG/DL (ref 65–99)
GLUCOSE BLD-MCNC: 162 MG/DL (ref 65–99)

## 2017-09-20 RX ORDER — GLYCOPYRROLATE 0.2 MG/ML
INJECTION, SOLUTION INTRAMUSCULAR; INTRAVENOUS
Status: COMPLETED
Start: 2017-09-20 | End: 2017-09-20

## 2017-09-20 RX ORDER — DEXTROSE MONOHYDRATE 25 G/50ML
50 INJECTION, SOLUTION INTRAVENOUS
Status: DISCONTINUED | OUTPATIENT
Start: 2017-09-20 | End: 2017-09-24

## 2017-09-20 RX ORDER — HYDROMORPHONE HYDROCHLORIDE 1 MG/ML
0.4 INJECTION, SOLUTION INTRAMUSCULAR; INTRAVENOUS; SUBCUTANEOUS EVERY 5 MIN PRN
Status: ACTIVE | OUTPATIENT
Start: 2017-09-20 | End: 2017-09-20

## 2017-09-20 RX ORDER — NALOXONE HYDROCHLORIDE 0.4 MG/ML
80 INJECTION, SOLUTION INTRAMUSCULAR; INTRAVENOUS; SUBCUTANEOUS AS NEEDED
Status: ACTIVE | OUTPATIENT
Start: 2017-09-20 | End: 2017-09-20

## 2017-09-20 RX ORDER — SODIUM CHLORIDE, SODIUM LACTATE, POTASSIUM CHLORIDE, CALCIUM CHLORIDE 600; 310; 30; 20 MG/100ML; MG/100ML; MG/100ML; MG/100ML
INJECTION, SOLUTION INTRAVENOUS CONTINUOUS
Status: DISCONTINUED | OUTPATIENT
Start: 2017-09-20 | End: 2017-09-24

## 2017-09-20 RX ORDER — ACETAMINOPHEN 500 MG
1000 TABLET ORAL ONCE AS NEEDED
Status: ACTIVE | OUTPATIENT
Start: 2017-09-20 | End: 2017-09-20

## 2017-09-20 RX ORDER — GLYCOPYRROLATE 0.2 MG/ML
0.2 INJECTION, SOLUTION INTRAMUSCULAR; INTRAVENOUS ONCE
Status: COMPLETED | OUTPATIENT
Start: 2017-09-20 | End: 2017-09-20

## 2017-09-20 RX ORDER — MIDAZOLAM HYDROCHLORIDE 1 MG/ML
INJECTION INTRAMUSCULAR; INTRAVENOUS
Status: COMPLETED
Start: 2017-09-20 | End: 2017-09-20

## 2017-09-20 RX ORDER — MIDAZOLAM HYDROCHLORIDE 1 MG/ML
1 INJECTION INTRAMUSCULAR; INTRAVENOUS EVERY 5 MIN PRN
Status: ACTIVE | OUTPATIENT
Start: 2017-09-20 | End: 2017-09-20

## 2017-09-20 RX ADMIN — GLYCOPYRROLATE 0.2 MG: 0.2 INJECTION, SOLUTION INTRAMUSCULAR; INTRAVENOUS at 06:47:00

## 2017-09-20 RX ADMIN — SODIUM CHLORIDE, SODIUM LACTATE, POTASSIUM CHLORIDE, CALCIUM CHLORIDE: 600; 310; 30; 20 INJECTION, SOLUTION INTRAVENOUS at 06:35:00

## 2017-09-20 RX ADMIN — MIDAZOLAM HYDROCHLORIDE 1 MG: 1 INJECTION INTRAMUSCULAR; INTRAVENOUS at 07:30:00

## 2017-09-20 NOTE — PROGRESS NOTES
[facility] / [facility]  ECT History & Physical    Barbara Borrero Patient Status:  Outpatient   Age/Gender 69year old female MRN [id number]   Location [address] [facility] Attending Saul Brooks MD   Hosp Day # 0 PCP No prim date: COLONOSCOPY      Comment: tublar adenoma  11/2014: ECT PROVIDED Bilateral      Comment: multiple ECT's every 3 weeks or so last 3                years  1998: MASTECTOMY LEFT Left      Comment: left breast cancer with mastectomy, chemo and Laurie

## 2017-09-20 NOTE — PROGRESS NOTES
[facility] / [facility]  ECT Procedure Note    Ana Maria Goldstein Patient Status:  Outpatient   Age/Gender 69year old female MRN [id number]   Location [address] [facility] Attending Nicole Loredo MD   Hosp Day # 0 PCP No

## 2017-10-04 RX ORDER — SODIUM CHLORIDE, SODIUM LACTATE, POTASSIUM CHLORIDE, CALCIUM CHLORIDE 600; 310; 30; 20 MG/100ML; MG/100ML; MG/100ML; MG/100ML
INJECTION, SOLUTION INTRAVENOUS CONTINUOUS
Status: CANCELLED | OUTPATIENT
Start: 2017-10-04

## 2017-10-18 ENCOUNTER — HOSPITAL ENCOUNTER (OUTPATIENT)
Dept: POSTOP/PACU | Facility: HOSPITAL | Age: 77
Discharge: HOME | End: 2017-10-18
Attending: Other
Payer: MEDICARE

## 2017-10-18 VITALS
DIASTOLIC BLOOD PRESSURE: 99 MMHG | TEMPERATURE: 98 F | HEART RATE: 93 BPM | OXYGEN SATURATION: 94 % | HEIGHT: 62 IN | RESPIRATION RATE: 15 BRPM | SYSTOLIC BLOOD PRESSURE: 159 MMHG

## 2017-10-18 DIAGNOSIS — F31.64 BIPOLAR AFFECTIVE DISORDER, MIXED, SEVERE, WITH PSYCHOTIC BEHAVIOR (HCC): ICD-10-CM

## 2017-10-18 DIAGNOSIS — F31.64 SEVERE BIPOLAR I DISORDER, MOST RECENT EPISODE MIXED, WITH PSYCHOTIC FEATURES (HCC): ICD-10-CM

## 2017-10-18 RX ORDER — DEXTROSE MONOHYDRATE 25 G/50ML
50 INJECTION, SOLUTION INTRAVENOUS
Status: DISCONTINUED | OUTPATIENT
Start: 2017-10-18 | End: 2017-10-22

## 2017-10-18 RX ORDER — HYDROMORPHONE HYDROCHLORIDE 1 MG/ML
0.4 INJECTION, SOLUTION INTRAMUSCULAR; INTRAVENOUS; SUBCUTANEOUS EVERY 5 MIN PRN
Status: ACTIVE | OUTPATIENT
Start: 2017-10-18 | End: 2017-10-18

## 2017-10-18 RX ORDER — GLYCOPYRROLATE 0.2 MG/ML
0.2 INJECTION, SOLUTION INTRAMUSCULAR; INTRAVENOUS ONCE
Status: COMPLETED | OUTPATIENT
Start: 2017-10-18 | End: 2017-10-18

## 2017-10-18 RX ORDER — SODIUM CHLORIDE, SODIUM LACTATE, POTASSIUM CHLORIDE, CALCIUM CHLORIDE 600; 310; 30; 20 MG/100ML; MG/100ML; MG/100ML; MG/100ML
INJECTION, SOLUTION INTRAVENOUS CONTINUOUS
Status: DISCONTINUED | OUTPATIENT
Start: 2017-10-18 | End: 2017-10-22

## 2017-10-18 RX ORDER — MIDAZOLAM HYDROCHLORIDE 1 MG/ML
1 INJECTION INTRAMUSCULAR; INTRAVENOUS EVERY 5 MIN PRN
Status: ACTIVE | OUTPATIENT
Start: 2017-10-18 | End: 2017-10-18

## 2017-10-18 RX ORDER — MIDAZOLAM HYDROCHLORIDE 1 MG/ML
INJECTION INTRAMUSCULAR; INTRAVENOUS
Status: COMPLETED
Start: 2017-10-18 | End: 2017-10-18

## 2017-10-18 RX ORDER — NALOXONE HYDROCHLORIDE 0.4 MG/ML
80 INJECTION, SOLUTION INTRAMUSCULAR; INTRAVENOUS; SUBCUTANEOUS AS NEEDED
Status: ACTIVE | OUTPATIENT
Start: 2017-10-18 | End: 2017-10-18

## 2017-10-18 RX ORDER — ACETAMINOPHEN 500 MG
1000 TABLET ORAL ONCE AS NEEDED
Status: ACTIVE | OUTPATIENT
Start: 2017-10-18 | End: 2017-10-18

## 2017-10-18 RX ORDER — GLYCOPYRROLATE 0.2 MG/ML
INJECTION, SOLUTION INTRAMUSCULAR; INTRAVENOUS
Status: COMPLETED
Start: 2017-10-18 | End: 2017-10-18

## 2017-10-18 RX ORDER — ONDANSETRON 2 MG/ML
4 INJECTION INTRAMUSCULAR; INTRAVENOUS AS NEEDED
Status: ACTIVE | OUTPATIENT
Start: 2017-10-18 | End: 2017-10-18

## 2017-10-18 RX ADMIN — GLYCOPYRROLATE 0.2 MG: 0.2 INJECTION, SOLUTION INTRAMUSCULAR; INTRAVENOUS at 07:01:00

## 2017-10-18 RX ADMIN — SODIUM CHLORIDE, SODIUM LACTATE, POTASSIUM CHLORIDE, CALCIUM CHLORIDE: 600; 310; 30; 20 INJECTION, SOLUTION INTRAVENOUS at 06:35:00

## 2017-10-18 NOTE — OR PREOP
Patient's daughter, Lucrecia Ortiz stated that patient has been having increased paranoia (not taking medications and hiding in her room), agitation and has been talking to herself more.  Daughter stated that it is common for these symptoms to start with the change

## 2017-10-19 NOTE — PROGRESS NOTES
[facility] / [facility]  ECT Procedure Note    Luis Miguel Antonio Patient Status:  Outpatient   Age/Gender 69year old female MRN [id number]   Location [address] [facility] Attending Ricky Noyola MD   Hosp Day # 0 PCP No

## 2017-10-19 NOTE — PROGRESS NOTES
[facility] / [facility]  ECT History & Physical    Bryce Memory Patient Status:  Outpatient   Age/Gender 69year old female MRN [id number]   Location [address] [facility] Attending Johan Beckham MD   Hosp Day # 0 PCP No prim radiation.   No date: COLONOSCOPY      Comment: tublar adenoma  11/2014: ECT PROVIDED Bilateral      Comment: multiple ECT's every 3 weeks or so last 3                years  1998: MASTECTOMY LEFT Left      Comment: left breast cancer with mastectomy, chemo proceed with plan of care.     Onur Suarez

## 2017-11-08 ENCOUNTER — HOSPITAL ENCOUNTER (OUTPATIENT)
Dept: POSTOP/PACU | Facility: HOSPITAL | Age: 77
Discharge: HOME | End: 2017-11-08
Attending: Other
Payer: MEDICARE

## 2017-11-08 VITALS
DIASTOLIC BLOOD PRESSURE: 72 MMHG | HEART RATE: 82 BPM | RESPIRATION RATE: 16 BRPM | HEIGHT: 62 IN | OXYGEN SATURATION: 97 % | TEMPERATURE: 98 F | SYSTOLIC BLOOD PRESSURE: 139 MMHG

## 2017-11-08 DIAGNOSIS — F31.64 SEVERE BIPOLAR I DISORDER, MOST RECENT EPISODE MIXED, WITH PSYCHOTIC FEATURES (HCC): ICD-10-CM

## 2017-11-08 DIAGNOSIS — F31.64 BIPOLAR AFFECTIVE DISORDER, MIXED, SEVERE, WITH PSYCHOTIC BEHAVIOR (HCC): ICD-10-CM

## 2017-11-08 RX ORDER — ONDANSETRON 2 MG/ML
4 INJECTION INTRAMUSCULAR; INTRAVENOUS AS NEEDED
Status: ACTIVE | OUTPATIENT
Start: 2017-11-08 | End: 2017-11-08

## 2017-11-08 RX ORDER — MIDAZOLAM HYDROCHLORIDE 1 MG/ML
INJECTION INTRAMUSCULAR; INTRAVENOUS
Status: COMPLETED
Start: 2017-11-08 | End: 2017-11-08

## 2017-11-08 RX ORDER — GLYCOPYRROLATE 0.2 MG/ML
0.2 INJECTION, SOLUTION INTRAMUSCULAR; INTRAVENOUS ONCE
Status: COMPLETED | OUTPATIENT
Start: 2017-11-08 | End: 2017-11-08

## 2017-11-08 RX ORDER — ACETAMINOPHEN 500 MG
1000 TABLET ORAL ONCE AS NEEDED
Status: ACTIVE | OUTPATIENT
Start: 2017-11-08 | End: 2017-11-08

## 2017-11-08 RX ORDER — SODIUM CHLORIDE, SODIUM LACTATE, POTASSIUM CHLORIDE, CALCIUM CHLORIDE 600; 310; 30; 20 MG/100ML; MG/100ML; MG/100ML; MG/100ML
INJECTION, SOLUTION INTRAVENOUS CONTINUOUS
Status: DISCONTINUED | OUTPATIENT
Start: 2017-11-08 | End: 2017-11-09

## 2017-11-08 RX ORDER — DEXTROSE MONOHYDRATE 25 G/50ML
50 INJECTION, SOLUTION INTRAVENOUS
Status: DISCONTINUED | OUTPATIENT
Start: 2017-11-08 | End: 2017-11-09

## 2017-11-08 RX ORDER — HYDROMORPHONE HYDROCHLORIDE 1 MG/ML
0.4 INJECTION, SOLUTION INTRAMUSCULAR; INTRAVENOUS; SUBCUTANEOUS EVERY 5 MIN PRN
Status: ACTIVE | OUTPATIENT
Start: 2017-11-08 | End: 2017-11-08

## 2017-11-08 RX ORDER — MIDAZOLAM HYDROCHLORIDE 1 MG/ML
1 INJECTION INTRAMUSCULAR; INTRAVENOUS EVERY 5 MIN PRN
Status: ACTIVE | OUTPATIENT
Start: 2017-11-08 | End: 2017-11-08

## 2017-11-08 RX ORDER — NALOXONE HYDROCHLORIDE 0.4 MG/ML
80 INJECTION, SOLUTION INTRAMUSCULAR; INTRAVENOUS; SUBCUTANEOUS AS NEEDED
Status: ACTIVE | OUTPATIENT
Start: 2017-11-08 | End: 2017-11-08

## 2017-11-08 RX ORDER — GLYCOPYRROLATE 0.2 MG/ML
INJECTION, SOLUTION INTRAMUSCULAR; INTRAVENOUS
Status: COMPLETED
Start: 2017-11-08 | End: 2017-11-08

## 2017-11-08 RX ADMIN — SODIUM CHLORIDE, SODIUM LACTATE, POTASSIUM CHLORIDE, CALCIUM CHLORIDE: 600; 310; 30; 20 INJECTION, SOLUTION INTRAVENOUS at 06:45:00

## 2017-11-08 RX ADMIN — MIDAZOLAM HYDROCHLORIDE 1 MG: 1 INJECTION INTRAMUSCULAR; INTRAVENOUS at 08:08:00

## 2017-11-08 RX ADMIN — GLYCOPYRROLATE 0.2 MG: 0.2 INJECTION, SOLUTION INTRAMUSCULAR; INTRAVENOUS at 07:01:00

## 2017-11-08 NOTE — PROGRESS NOTES
[facility] / [facility]  ECT Procedure Note    Analisa Enriques Patient Status:  Outpatient   Age/Gender 69year old female MRN [id number]   Location [address] [facility] Attending Brii Gan MD   Hosp Day # 0 PCP No

## 2017-11-08 NOTE — PROGRESS NOTES
[facility] / [facility]  ECT History & Physical    Fernanda Lynch Patient Status:  Outpatient   Age/Gender 69year old female MRN [id number]   Location [address] [facility] Attending Lorena Reynolds MD   Hosp Day # 0 PCP No prim COLONOSCOPY      Comment: tublar adenoma  11/2014: ECT PROVIDED Bilateral      Comment: multiple ECT's every 3 weeks or so last 3                years  1998: MASTECTOMY LEFT Left      Comment: left breast cancer with mastectomy, chemo and                ra Laurie

## 2017-11-08 NOTE — OR PREOP
Patient had blood sugar in the 50s prior to arrival for ECT. Patient was given 4 oz of orange juice at 0430. Dr. Isaias Jensen notified, ok to proceed. Port accessed, no blood return but able to be flushed with some resistance.  Dr. Isaias Jensen notified- [facility] proc

## 2017-11-20 ENCOUNTER — HOSPITAL ENCOUNTER (OUTPATIENT)
Dept: POSTOP/PACU | Facility: HOSPITAL | Age: 77
Discharge: HOME | End: 2017-11-20
Attending: Other
Payer: MEDICARE

## 2017-11-20 VITALS
HEART RATE: 97 BPM | TEMPERATURE: 99 F | HEIGHT: 62 IN | OXYGEN SATURATION: 95 % | DIASTOLIC BLOOD PRESSURE: 96 MMHG | SYSTOLIC BLOOD PRESSURE: 135 MMHG | RESPIRATION RATE: 20 BRPM

## 2017-11-20 DIAGNOSIS — F31.64 SEVERE BIPOLAR I DISORDER, MOST RECENT EPISODE MIXED, WITH PSYCHOTIC FEATURES (HCC): ICD-10-CM

## 2017-11-20 DIAGNOSIS — F25.0 SCHIZOAFFECTIVE DISORDER, BIPOLAR TYPE (HCC): ICD-10-CM

## 2017-11-20 RX ORDER — GLYCOPYRROLATE 0.2 MG/ML
0.2 INJECTION, SOLUTION INTRAMUSCULAR; INTRAVENOUS ONCE
Status: COMPLETED | OUTPATIENT
Start: 2017-11-20 | End: 2017-11-20

## 2017-11-20 RX ORDER — LABETALOL HYDROCHLORIDE 5 MG/ML
INJECTION, SOLUTION INTRAVENOUS
Status: COMPLETED
Start: 2017-11-20 | End: 2017-11-20

## 2017-11-20 RX ORDER — LABETALOL HYDROCHLORIDE 5 MG/ML
5 INJECTION, SOLUTION INTRAVENOUS ONCE
Status: COMPLETED | OUTPATIENT
Start: 2017-11-20 | End: 2017-11-20

## 2017-11-20 RX ORDER — GLYCOPYRROLATE 0.2 MG/ML
INJECTION, SOLUTION INTRAMUSCULAR; INTRAVENOUS
Status: COMPLETED
Start: 2017-11-20 | End: 2017-11-20

## 2017-11-20 RX ORDER — SODIUM CHLORIDE, SODIUM LACTATE, POTASSIUM CHLORIDE, CALCIUM CHLORIDE 600; 310; 30; 20 MG/100ML; MG/100ML; MG/100ML; MG/100ML
INJECTION, SOLUTION INTRAVENOUS CONTINUOUS
Status: DISCONTINUED | OUTPATIENT
Start: 2017-11-20 | End: 2017-11-21

## 2017-11-20 RX ORDER — DEXTROSE MONOHYDRATE 25 G/50ML
50 INJECTION, SOLUTION INTRAVENOUS
Status: DISCONTINUED | OUTPATIENT
Start: 2017-11-20 | End: 2017-11-21

## 2017-11-20 RX ORDER — MIDAZOLAM HYDROCHLORIDE 1 MG/ML
INJECTION INTRAMUSCULAR; INTRAVENOUS
Status: COMPLETED
Start: 2017-11-20 | End: 2017-11-20

## 2017-11-20 RX ADMIN — LABETALOL HYDROCHLORIDE 5 MG: 5 INJECTION, SOLUTION INTRAVENOUS at 09:13:00

## 2017-11-20 RX ADMIN — GLYCOPYRROLATE 0.2 MG: 0.2 INJECTION, SOLUTION INTRAMUSCULAR; INTRAVENOUS at 07:05:00

## 2017-11-20 NOTE — PROGRESS NOTES
[facility] / [facility]  ECT Procedure Note    Ana Maria Goldstein Patient Status:  Outpatient   Age/Gender 69year old female MRN [id number]   Location [address] [facility] Attending Nicole Loredo MD   Hosp Day # 0 PCP No

## 2017-11-20 NOTE — PROGRESS NOTES
[facility] / [facility]  ECT History & Physical    Lelia Cordero Patient Status:  Outpatient   Age/Gender 69year old female MRN [id number]   Location [address] [facility] Attending Orlando Del Angel MD   Hosp Day # 0 PCP No prim COLONOSCOPY      Comment: tublar adenoma  11/2014: ECT PROVIDED Bilateral      Comment: multiple ECT's every 3 weeks or so last 3                years  1998: MASTECTOMY LEFT Left      Comment: left breast cancer with mastectomy, chemo and                ra S2 normal, no murmur, rub   or gallop   Abdomen:     Soft, non-tender, bowel sounds active all four quadrants,     no masses, no organomegaly   Extremities:   Extremities normal, atraumatic, no cyanosis or edema   Pulses:   2+ and symmetric all extremities

## 2017-11-29 RX ORDER — DEXTROSE MONOHYDRATE 25 G/50ML
50 INJECTION, SOLUTION INTRAVENOUS
Status: CANCELLED | OUTPATIENT
Start: 2017-11-29

## 2017-11-29 RX ORDER — SODIUM CHLORIDE, SODIUM LACTATE, POTASSIUM CHLORIDE, CALCIUM CHLORIDE 600; 310; 30; 20 MG/100ML; MG/100ML; MG/100ML; MG/100ML
INJECTION, SOLUTION INTRAVENOUS CONTINUOUS
Status: CANCELLED | OUTPATIENT
Start: 2017-11-29

## 2017-12-13 ENCOUNTER — HOSPITAL ENCOUNTER (OUTPATIENT)
Dept: POSTOP/PACU | Facility: HOSPITAL | Age: 77
Discharge: HOME | End: 2017-12-13
Attending: Other
Payer: MEDICARE

## 2017-12-13 VITALS
TEMPERATURE: 98 F | HEIGHT: 62 IN | RESPIRATION RATE: 16 BRPM | OXYGEN SATURATION: 97 % | SYSTOLIC BLOOD PRESSURE: 147 MMHG | HEART RATE: 78 BPM | DIASTOLIC BLOOD PRESSURE: 92 MMHG

## 2017-12-13 DIAGNOSIS — F31.64 BIPOLAR I DISORDER, MOST RECENT EPISODE MIXED, SEVERE WITH PSYCHOTIC FEATURES (HCC): ICD-10-CM

## 2017-12-13 DIAGNOSIS — F31.64 SEVERE BIPOLAR I DISORDER, MOST RECENT EPISODE MIXED, WITH PSYCHOTIC FEATURES (HCC): ICD-10-CM

## 2017-12-13 RX ORDER — HYDROMORPHONE HYDROCHLORIDE 1 MG/ML
0.4 INJECTION, SOLUTION INTRAMUSCULAR; INTRAVENOUS; SUBCUTANEOUS EVERY 5 MIN PRN
Status: ACTIVE | OUTPATIENT
Start: 2017-12-13 | End: 2017-12-13

## 2017-12-13 RX ORDER — MIDAZOLAM HYDROCHLORIDE 1 MG/ML
1 INJECTION INTRAMUSCULAR; INTRAVENOUS ONCE
Status: COMPLETED | OUTPATIENT
Start: 2017-12-13 | End: 2017-12-13

## 2017-12-13 RX ORDER — ACETAMINOPHEN 500 MG
1000 TABLET ORAL ONCE AS NEEDED
Status: ACTIVE | OUTPATIENT
Start: 2017-12-13 | End: 2017-12-13

## 2017-12-13 RX ORDER — MIDAZOLAM HYDROCHLORIDE 1 MG/ML
INJECTION INTRAMUSCULAR; INTRAVENOUS
Status: COMPLETED
Start: 2017-12-13 | End: 2017-12-13

## 2017-12-13 RX ORDER — GLYCOPYRROLATE 0.2 MG/ML
INJECTION, SOLUTION INTRAMUSCULAR; INTRAVENOUS
Status: COMPLETED
Start: 2017-12-13 | End: 2017-12-13

## 2017-12-13 RX ORDER — SODIUM CHLORIDE, SODIUM LACTATE, POTASSIUM CHLORIDE, CALCIUM CHLORIDE 600; 310; 30; 20 MG/100ML; MG/100ML; MG/100ML; MG/100ML
INJECTION, SOLUTION INTRAVENOUS CONTINUOUS
Status: DISCONTINUED | OUTPATIENT
Start: 2017-12-13 | End: 2017-12-14

## 2017-12-13 RX ORDER — GLYCOPYRROLATE 0.2 MG/ML
0.2 INJECTION, SOLUTION INTRAMUSCULAR; INTRAVENOUS ONCE
Status: COMPLETED | OUTPATIENT
Start: 2017-12-13 | End: 2017-12-13

## 2017-12-13 RX ORDER — NALOXONE HYDROCHLORIDE 0.4 MG/ML
80 INJECTION, SOLUTION INTRAMUSCULAR; INTRAVENOUS; SUBCUTANEOUS AS NEEDED
Status: ACTIVE | OUTPATIENT
Start: 2017-12-13 | End: 2017-12-13

## 2017-12-13 RX ORDER — DEXTROSE MONOHYDRATE 25 G/50ML
50 INJECTION, SOLUTION INTRAVENOUS
Status: DISCONTINUED | OUTPATIENT
Start: 2017-12-13 | End: 2017-12-14

## 2017-12-13 RX ADMIN — SODIUM CHLORIDE, SODIUM LACTATE, POTASSIUM CHLORIDE, CALCIUM CHLORIDE: 600; 310; 30; 20 INJECTION, SOLUTION INTRAVENOUS at 06:45:00

## 2017-12-13 RX ADMIN — GLYCOPYRROLATE 0.2 MG: 0.2 INJECTION, SOLUTION INTRAMUSCULAR; INTRAVENOUS at 06:52:00

## 2017-12-13 RX ADMIN — MIDAZOLAM HYDROCHLORIDE 1 MG: 1 INJECTION INTRAMUSCULAR; INTRAVENOUS at 08:00:00

## 2017-12-13 NOTE — PROGRESS NOTES
[facility] / [facility]  ECT Procedure Note    Radha Deluna Patient Status:  Outpatient   Age/Gender 69year old female MRN [id number]   Location [address] [facility] Attending Cj Mcguire MD   Hosp Day # 0 PCP No

## 2017-12-13 NOTE — PROGRESS NOTES
[facility] / [facility]  ECT History & Physical    Fadicassidy Huff Patient Status:  Outpatient   Age/Gender 69year old female MRN [id number]   Location [address] [facility] Attending Bren Valdivia MD   Hosp Day # 0 PCP No prim date: COLONOSCOPY      Comment: tublar adenoma  11/2014: ECT PROVIDED Bilateral      Comment: multiple ECT's every 3 weeks or so last 3                years  1998: MASTECTOMY LEFT Left      Comment: left breast cancer with mastectomy, chemo and Laurie

## 2017-12-20 VITALS — HEIGHT: 62 IN

## 2017-12-20 RX ORDER — SODIUM CHLORIDE, SODIUM LACTATE, POTASSIUM CHLORIDE, CALCIUM CHLORIDE 600; 310; 30; 20 MG/100ML; MG/100ML; MG/100ML; MG/100ML
INJECTION, SOLUTION INTRAVENOUS CONTINUOUS
Status: CANCELLED | OUTPATIENT
Start: 2017-12-20

## 2017-12-20 RX ORDER — DEXTROSE MONOHYDRATE 25 G/50ML
50 INJECTION, SOLUTION INTRAVENOUS
Status: CANCELLED | OUTPATIENT
Start: 2017-12-20

## 2018-01-03 ENCOUNTER — HOSPITAL ENCOUNTER (OUTPATIENT)
Dept: POSTOP/PACU | Facility: HOSPITAL | Age: 78
Discharge: HOME | End: 2018-01-03
Attending: Other

## 2018-01-04 RX ORDER — DEXTROSE MONOHYDRATE 25 G/50ML
50 INJECTION, SOLUTION INTRAVENOUS
Status: DISCONTINUED | OUTPATIENT
Start: 2018-01-04 | End: 2018-01-04

## 2018-01-04 RX ORDER — DEXTROSE MONOHYDRATE 25 G/50ML
50 INJECTION, SOLUTION INTRAVENOUS
Status: DISCONTINUED | OUTPATIENT
Start: 2018-01-04 | End: 2018-01-09

## 2018-01-04 RX ORDER — SODIUM CHLORIDE, SODIUM LACTATE, POTASSIUM CHLORIDE, CALCIUM CHLORIDE 600; 310; 30; 20 MG/100ML; MG/100ML; MG/100ML; MG/100ML
INJECTION, SOLUTION INTRAVENOUS CONTINUOUS
Status: DISCONTINUED | OUTPATIENT
Start: 2018-01-04 | End: 2018-01-09

## 2018-01-05 RX ORDER — NAPHAZOLINE/ZINC SULF/GLYCERIN 0.012-0.25
DROPS OPHTHALMIC (EYE) DAILY
COMMUNITY
End: 2018-05-31

## 2018-01-08 ENCOUNTER — HOSPITAL ENCOUNTER (OUTPATIENT)
Dept: POSTOP/PACU | Facility: HOSPITAL | Age: 78
Discharge: HOME | End: 2018-01-08
Attending: Other
Payer: MEDICARE

## 2018-01-08 VITALS
HEART RATE: 95 BPM | HEIGHT: 62 IN | SYSTOLIC BLOOD PRESSURE: 144 MMHG | RESPIRATION RATE: 13 BRPM | OXYGEN SATURATION: 95 % | DIASTOLIC BLOOD PRESSURE: 87 MMHG

## 2018-01-08 DIAGNOSIS — F31.64 BIPOLAR AFFECTIVE DISORDER, MIXED, SEVERE, WITH PSYCHOTIC BEHAVIOR (HCC): ICD-10-CM

## 2018-01-08 DIAGNOSIS — F31.64 SEVERE BIPOLAR I DISORDER, MOST RECENT EPISODE MIXED, WITH PSYCHOTIC FEATURES (HCC): ICD-10-CM

## 2018-01-08 LAB
GLUCOSE BLD-MCNC: 217 MG/DL (ref 65–99)
GLUCOSE BLD-MCNC: 251 MG/DL (ref 65–99)

## 2018-01-08 RX ORDER — ACETAMINOPHEN 500 MG
1000 TABLET ORAL ONCE AS NEEDED
Status: ACTIVE | OUTPATIENT
Start: 2018-01-08 | End: 2018-01-08

## 2018-01-08 RX ORDER — SODIUM CHLORIDE, SODIUM LACTATE, POTASSIUM CHLORIDE, CALCIUM CHLORIDE 600; 310; 30; 20 MG/100ML; MG/100ML; MG/100ML; MG/100ML
INJECTION, SOLUTION INTRAVENOUS CONTINUOUS
Status: DISCONTINUED | OUTPATIENT
Start: 2018-01-08 | End: 2018-01-09

## 2018-01-08 RX ORDER — HYDROMORPHONE HYDROCHLORIDE 1 MG/ML
0.4 INJECTION, SOLUTION INTRAMUSCULAR; INTRAVENOUS; SUBCUTANEOUS EVERY 5 MIN PRN
Status: ACTIVE | OUTPATIENT
Start: 2018-01-08 | End: 2018-01-08

## 2018-01-08 RX ORDER — DEXTROSE MONOHYDRATE 25 G/50ML
50 INJECTION, SOLUTION INTRAVENOUS
Status: DISCONTINUED | OUTPATIENT
Start: 2018-01-08 | End: 2018-01-08

## 2018-01-08 RX ORDER — NALOXONE HYDROCHLORIDE 0.4 MG/ML
80 INJECTION, SOLUTION INTRAMUSCULAR; INTRAVENOUS; SUBCUTANEOUS AS NEEDED
Status: ACTIVE | OUTPATIENT
Start: 2018-01-08 | End: 2018-01-08

## 2018-01-08 RX ORDER — MIDAZOLAM HYDROCHLORIDE 1 MG/ML
1 INJECTION INTRAMUSCULAR; INTRAVENOUS EVERY 5 MIN PRN
Status: ACTIVE | OUTPATIENT
Start: 2018-01-08 | End: 2018-01-08

## 2018-01-08 RX ORDER — DEXTROSE MONOHYDRATE 25 G/50ML
50 INJECTION, SOLUTION INTRAVENOUS
Status: DISCONTINUED | OUTPATIENT
Start: 2018-01-08 | End: 2018-01-09

## 2018-01-08 RX ORDER — GLYCOPYRROLATE 0.2 MG/ML
INJECTION, SOLUTION INTRAMUSCULAR; INTRAVENOUS
Status: COMPLETED
Start: 2018-01-08 | End: 2018-01-08

## 2018-01-08 RX ORDER — INSULIN ASPART 100 [IU]/ML
INJECTION, SOLUTION INTRAVENOUS; SUBCUTANEOUS
Status: DISCONTINUED
Start: 2018-01-08 | End: 2018-01-08 | Stop reason: WASHOUT

## 2018-01-08 RX ORDER — GLYCOPYRROLATE 0.2 MG/ML
0.2 INJECTION, SOLUTION INTRAMUSCULAR; INTRAVENOUS ONCE
Status: COMPLETED | OUTPATIENT
Start: 2018-01-08 | End: 2018-01-08

## 2018-01-08 RX ORDER — INSULIN ASPART 100 [IU]/ML
INJECTION, SOLUTION INTRAVENOUS; SUBCUTANEOUS ONCE
Status: COMPLETED | OUTPATIENT
Start: 2018-01-08 | End: 2018-01-08

## 2018-01-08 RX ADMIN — SODIUM CHLORIDE, SODIUM LACTATE, POTASSIUM CHLORIDE, CALCIUM CHLORIDE: 600; 310; 30; 20 INJECTION, SOLUTION INTRAVENOUS at 07:11:00

## 2018-01-08 RX ADMIN — GLYCOPYRROLATE 0.2 MG: 0.2 INJECTION, SOLUTION INTRAMUSCULAR; INTRAVENOUS at 06:58:00

## 2018-01-08 RX ADMIN — INSULIN ASPART 2 UNITS: 100 INJECTION, SOLUTION INTRAVENOUS; SUBCUTANEOUS at 08:00:00

## 2018-01-08 NOTE — PROGRESS NOTES
[facility] / [facility]  ECT Procedure Note    Maine Page Patient Status:  Outpatient   Age/Gender 69year old female MRN [id number]   Location [address] [facility] Attending Makenna Bee MD   Hosp Day # 0 PCP No

## 2018-01-08 NOTE — PROGRESS NOTES
[facility] / [facility]  ECT History & Physical    Fiona Friends Patient Status:  Outpatient   Age/Gender 69year old female MRN [id number]   Location [address] [facility] Attending Shaheen Lopez MD   Hosp Day # 0 PCP No prim COLONOSCOPY      Comment: tublar adenoma  11/2014: ECT PROVIDED Bilateral      Comment: multiple ECT's every 3 weeks or so last 3                years  1998: MASTECTOMY LEFT Left      Comment: left breast cancer with mastectomy, chemo and                ra Soft, non-tender, bowel sounds active all four quadrants,     no masses, no organomegaly   Extremities:   Extremities normal, atraumatic, no cyanosis or edema   Pulses:   2+ and symmetric all extremities   Skin:   Skin color, texture, turgor normal, no

## 2018-02-07 ENCOUNTER — HOSPITAL ENCOUNTER (OUTPATIENT)
Dept: POSTOP/PACU | Facility: HOSPITAL | Age: 78
Discharge: HOME | End: 2018-02-07
Attending: Other
Payer: MEDICARE

## 2018-02-07 VITALS
DIASTOLIC BLOOD PRESSURE: 98 MMHG | TEMPERATURE: 98 F | HEART RATE: 74 BPM | RESPIRATION RATE: 18 BRPM | SYSTOLIC BLOOD PRESSURE: 110 MMHG | HEIGHT: 62 IN | OXYGEN SATURATION: 94 %

## 2018-02-07 DIAGNOSIS — F31.64 BIPOLAR I DISORDER, MOST RECENT EPISODE MIXED, SEVERE WITH PSYCHOTIC FEATURES (HCC): ICD-10-CM

## 2018-02-07 DIAGNOSIS — F31.64 SEVERE BIPOLAR I DISORDER, MOST RECENT EPISODE MIXED, WITH PSYCHOTIC FEATURES (HCC): ICD-10-CM

## 2018-02-07 LAB
GLUCOSE BLD-MCNC: 179 MG/DL (ref 65–99)
GLUCOSE BLD-MCNC: 245 MG/DL (ref 65–99)

## 2018-02-07 RX ORDER — MORPHINE SULFATE 2 MG/ML
2 INJECTION, SOLUTION INTRAMUSCULAR; INTRAVENOUS EVERY 5 MIN PRN
Status: ACTIVE | OUTPATIENT
Start: 2018-02-07 | End: 2018-02-07

## 2018-02-07 RX ORDER — GLYCOPYRROLATE 0.2 MG/ML
0.2 INJECTION, SOLUTION INTRAMUSCULAR; INTRAVENOUS ONCE
Status: COMPLETED | OUTPATIENT
Start: 2018-02-07 | End: 2018-02-07

## 2018-02-07 RX ORDER — NALOXONE HYDROCHLORIDE 0.4 MG/ML
80 INJECTION, SOLUTION INTRAMUSCULAR; INTRAVENOUS; SUBCUTANEOUS AS NEEDED
Status: ACTIVE | OUTPATIENT
Start: 2018-02-07 | End: 2018-02-07

## 2018-02-07 RX ORDER — SODIUM CHLORIDE, SODIUM LACTATE, POTASSIUM CHLORIDE, CALCIUM CHLORIDE 600; 310; 30; 20 MG/100ML; MG/100ML; MG/100ML; MG/100ML
INJECTION, SOLUTION INTRAVENOUS CONTINUOUS
Status: DISCONTINUED | OUTPATIENT
Start: 2018-02-07 | End: 2018-02-16

## 2018-02-07 RX ORDER — INSULIN ASPART 100 [IU]/ML
4 INJECTION, SOLUTION INTRAVENOUS; SUBCUTANEOUS ONCE
Status: COMPLETED | OUTPATIENT
Start: 2018-02-07 | End: 2018-02-07

## 2018-02-07 RX ORDER — MIDAZOLAM HYDROCHLORIDE 1 MG/ML
1 INJECTION INTRAMUSCULAR; INTRAVENOUS EVERY 5 MIN PRN
Status: ACTIVE | OUTPATIENT
Start: 2018-02-07 | End: 2018-02-07

## 2018-02-07 RX ORDER — GLYCOPYRROLATE 0.2 MG/ML
INJECTION, SOLUTION INTRAMUSCULAR; INTRAVENOUS
Status: COMPLETED
Start: 2018-02-07 | End: 2018-02-07

## 2018-02-07 RX ORDER — DEXTROSE MONOHYDRATE 25 G/50ML
50 INJECTION, SOLUTION INTRAVENOUS
Status: DISCONTINUED | OUTPATIENT
Start: 2018-02-07 | End: 2018-02-16

## 2018-02-07 RX ORDER — MIDAZOLAM HYDROCHLORIDE 1 MG/ML
INJECTION INTRAMUSCULAR; INTRAVENOUS
Status: COMPLETED
Start: 2018-02-07 | End: 2018-02-07

## 2018-02-07 RX ORDER — ACETAMINOPHEN 500 MG
1000 TABLET ORAL ONCE AS NEEDED
Status: ACTIVE | OUTPATIENT
Start: 2018-02-07 | End: 2018-02-07

## 2018-02-07 RX ORDER — INSULIN ASPART 100 [IU]/ML
INJECTION, SOLUTION INTRAVENOUS; SUBCUTANEOUS
Status: COMPLETED
Start: 2018-02-07 | End: 2018-02-07

## 2018-02-07 RX ADMIN — GLYCOPYRROLATE 0.2 MG: 0.2 INJECTION, SOLUTION INTRAMUSCULAR; INTRAVENOUS at 07:20:00

## 2018-02-07 RX ADMIN — MIDAZOLAM HYDROCHLORIDE 1 MG: 1 INJECTION INTRAMUSCULAR; INTRAVENOUS at 08:01:00

## 2018-02-07 RX ADMIN — SODIUM CHLORIDE, SODIUM LACTATE, POTASSIUM CHLORIDE, CALCIUM CHLORIDE: 600; 310; 30; 20 INJECTION, SOLUTION INTRAVENOUS at 07:20:00

## 2018-02-07 RX ADMIN — INSULIN ASPART 4 UNITS: 100 INJECTION, SOLUTION INTRAVENOUS; SUBCUTANEOUS at 08:58:00

## 2018-02-07 NOTE — PROGRESS NOTES
Daughter notified that patient received 4 units of novolog at approximately 9:00 a.m. Instructed to watch for signs of hypoglycemia. Daughter verbalized understanding.

## 2018-02-07 NOTE — PROGRESS NOTES
[facility] / [facility]  ECT History & Physical    Fiona Friends Patient Status:  Outpatient   Age/Gender 69year old female MRN [id number]   Location [address] [facility] Attending Shaheen Lopez MD   Hosp Day # 0 PCP No prim COLONOSCOPY      Comment: tublar adenoma  11/2014: ECT PROVIDED Bilateral      Comment: multiple ECT's every 3 weeks or so last 3                years  1998: MASTECTOMY LEFT Left      Comment: left breast cancer with mastectomy, chemo and                ra to proceed with plan of care.     Doni Cavanaugh

## 2018-02-07 NOTE — PROGRESS NOTES
[facility] / [facility]  ECT Procedure Note    Lelia Cordero Patient Status:  Outpatient   Age/Gender 69year old female MRN [id number]   Location [address] [facility] Attending Orlando Del Angel MD   Hosp Day # 0 PCP No

## 2018-03-07 ENCOUNTER — HOSPITAL ENCOUNTER (OUTPATIENT)
Dept: POSTOP/PACU | Facility: HOSPITAL | Age: 78
Discharge: HOME | End: 2018-03-07
Attending: Other
Payer: MEDICARE

## 2018-03-07 VITALS
DIASTOLIC BLOOD PRESSURE: 82 MMHG | SYSTOLIC BLOOD PRESSURE: 153 MMHG | RESPIRATION RATE: 25 BRPM | HEART RATE: 103 BPM | HEIGHT: 62 IN | OXYGEN SATURATION: 94 %

## 2018-03-07 DIAGNOSIS — F31.64 SEVERE BIPOLAR I DISORDER, MOST RECENT EPISODE MIXED, WITH PSYCHOTIC FEATURES (HCC): ICD-10-CM

## 2018-03-07 DIAGNOSIS — F31.64 BIPOLAR AFFECTIVE DISORDER, MIXED, SEVERE, WITH PSYCHOTIC BEHAVIOR (HCC): ICD-10-CM

## 2018-03-07 LAB — GLUCOSE BLD-MCNC: 435 MG/DL (ref 65–99)

## 2018-03-07 RX ORDER — GLYCOPYRROLATE 0.2 MG/ML
INJECTION, SOLUTION INTRAMUSCULAR; INTRAVENOUS
Status: COMPLETED
Start: 2018-03-07 | End: 2018-03-07

## 2018-03-07 RX ORDER — MIDAZOLAM HYDROCHLORIDE 1 MG/ML
1 INJECTION INTRAMUSCULAR; INTRAVENOUS ONCE
Status: COMPLETED | OUTPATIENT
Start: 2018-03-07 | End: 2018-03-07

## 2018-03-07 RX ORDER — MIDAZOLAM HYDROCHLORIDE 1 MG/ML
INJECTION INTRAMUSCULAR; INTRAVENOUS
Status: COMPLETED
Start: 2018-03-07 | End: 2018-03-07

## 2018-03-07 RX ORDER — INSULIN ASPART 100 [IU]/ML
INJECTION, SOLUTION INTRAVENOUS; SUBCUTANEOUS
Status: COMPLETED
Start: 2018-03-07 | End: 2018-03-07

## 2018-03-07 RX ORDER — DEXTROSE MONOHYDRATE 25 G/50ML
50 INJECTION, SOLUTION INTRAVENOUS
Status: DISCONTINUED | OUTPATIENT
Start: 2018-03-07 | End: 2018-03-08

## 2018-03-07 RX ORDER — SODIUM CHLORIDE, SODIUM LACTATE, POTASSIUM CHLORIDE, CALCIUM CHLORIDE 600; 310; 30; 20 MG/100ML; MG/100ML; MG/100ML; MG/100ML
INJECTION, SOLUTION INTRAVENOUS CONTINUOUS
Status: DISCONTINUED | OUTPATIENT
Start: 2018-03-07 | End: 2018-03-08

## 2018-03-07 RX ORDER — GLYCOPYRROLATE 0.2 MG/ML
0.2 INJECTION, SOLUTION INTRAMUSCULAR; INTRAVENOUS ONCE
Status: COMPLETED | OUTPATIENT
Start: 2018-03-07 | End: 2018-03-07

## 2018-03-07 RX ADMIN — GLYCOPYRROLATE 0.2 MG: 0.2 INJECTION, SOLUTION INTRAMUSCULAR; INTRAVENOUS at 07:08:00

## 2018-03-07 RX ADMIN — MIDAZOLAM HYDROCHLORIDE 1 MG: 1 INJECTION INTRAMUSCULAR; INTRAVENOUS at 07:33:00

## 2018-03-07 NOTE — PROGRESS NOTES
[facility] / [facility]  ECT History & Physical    Luis Miguel Malenas Patient Status:  Outpatient   Age/Gender 69year old female MRN [id number]   Location [address] [facility] Attending Ricky Noyola MD   Hosp Day # 0 PCP No prim radiation.   No date: COLONOSCOPY      Comment: tublar adenoma  11/2014: ECT PROVIDED Bilateral      Comment: multiple ECT's every 3 weeks or so last 3                years  1998: MASTECTOMY LEFT Left      Comment: left breast cancer with mastectomy, ch plan of care.     [facility]

## 2018-03-07 NOTE — PROGRESS NOTES
[facility] / [facility]  ECT Procedure Note    Nicholas Simmons Patient Status:  Outpatient   Age/Gender 69year old female MRN [id number]   Location [address] [facility] Attending Lord Fabian MD   Hosp Day # 0 PCP No

## 2018-04-16 ENCOUNTER — HOSPITAL ENCOUNTER (OUTPATIENT)
Dept: POSTOP/PACU | Facility: HOSPITAL | Age: 78
Discharge: HOME | End: 2018-04-16
Attending: Other
Payer: MEDICARE

## 2018-04-16 VITALS
OXYGEN SATURATION: 92 % | DIASTOLIC BLOOD PRESSURE: 106 MMHG | SYSTOLIC BLOOD PRESSURE: 184 MMHG | HEIGHT: 62 IN | HEART RATE: 92 BPM | TEMPERATURE: 99 F | RESPIRATION RATE: 22 BRPM

## 2018-04-16 DIAGNOSIS — F31.64 SEVERE BIPOLAR I DISORDER, MOST RECENT EPISODE MIXED, WITH PSYCHOTIC FEATURES (HCC): ICD-10-CM

## 2018-04-16 RX ORDER — DEXTROSE MONOHYDRATE 25 G/50ML
50 INJECTION, SOLUTION INTRAVENOUS
Status: DISCONTINUED | OUTPATIENT
Start: 2018-04-16 | End: 2018-04-18

## 2018-04-16 RX ORDER — ONDANSETRON 2 MG/ML
4 INJECTION INTRAMUSCULAR; INTRAVENOUS AS NEEDED
Status: ACTIVE | OUTPATIENT
Start: 2018-04-16 | End: 2018-04-16

## 2018-04-16 RX ORDER — NALOXONE HYDROCHLORIDE 0.4 MG/ML
80 INJECTION, SOLUTION INTRAMUSCULAR; INTRAVENOUS; SUBCUTANEOUS AS NEEDED
Status: ACTIVE | OUTPATIENT
Start: 2018-04-16 | End: 2018-04-16

## 2018-04-16 RX ORDER — MIDAZOLAM HYDROCHLORIDE 1 MG/ML
INJECTION INTRAMUSCULAR; INTRAVENOUS
Status: DISCONTINUED
Start: 2018-04-16 | End: 2018-04-16 | Stop reason: WASHOUT

## 2018-04-16 RX ORDER — SODIUM CHLORIDE, SODIUM LACTATE, POTASSIUM CHLORIDE, CALCIUM CHLORIDE 600; 310; 30; 20 MG/100ML; MG/100ML; MG/100ML; MG/100ML
INJECTION, SOLUTION INTRAVENOUS CONTINUOUS
Status: DISCONTINUED | OUTPATIENT
Start: 2018-04-16 | End: 2018-04-18

## 2018-04-16 RX ORDER — GLYCOPYRROLATE 0.2 MG/ML
INJECTION, SOLUTION INTRAMUSCULAR; INTRAVENOUS
Status: COMPLETED
Start: 2018-04-16 | End: 2018-04-16

## 2018-04-16 RX ORDER — GLYCOPYRROLATE 0.2 MG/ML
INJECTION, SOLUTION INTRAMUSCULAR; INTRAVENOUS
Status: DISCONTINUED
Start: 2018-04-16 | End: 2018-04-16 | Stop reason: WASHOUT

## 2018-04-16 RX ORDER — ACETAMINOPHEN 500 MG
1000 TABLET ORAL ONCE AS NEEDED
Status: ACTIVE | OUTPATIENT
Start: 2018-04-16 | End: 2018-04-16

## 2018-04-16 RX ORDER — MIDAZOLAM HYDROCHLORIDE 1 MG/ML
1 INJECTION INTRAMUSCULAR; INTRAVENOUS EVERY 5 MIN PRN
Status: ACTIVE | OUTPATIENT
Start: 2018-04-16 | End: 2018-04-16

## 2018-04-16 RX ORDER — GLYCOPYRROLATE 0.2 MG/ML
0.2 INJECTION, SOLUTION INTRAMUSCULAR; INTRAVENOUS ONCE
Status: DISCONTINUED | OUTPATIENT
Start: 2018-04-16 | End: 2018-04-18

## 2018-04-16 RX ORDER — GLYCOPYRROLATE 0.2 MG/ML
0.2 INJECTION, SOLUTION INTRAMUSCULAR; INTRAVENOUS ONCE
Status: COMPLETED | OUTPATIENT
Start: 2018-04-16 | End: 2018-04-16

## 2018-04-16 RX ADMIN — SODIUM CHLORIDE, SODIUM LACTATE, POTASSIUM CHLORIDE, CALCIUM CHLORIDE: 600; 310; 30; 20 INJECTION, SOLUTION INTRAVENOUS at 07:09:00

## 2018-04-16 RX ADMIN — GLYCOPYRROLATE 0.2 MG: 0.2 INJECTION, SOLUTION INTRAMUSCULAR; INTRAVENOUS at 07:10:00

## 2018-04-16 NOTE — PROGRESS NOTES
[facility] / [facility]  ECT History & Physical    Vu Mcfarland Patient Status:  Outpatient   Age/Gender 69year old female MRN [id number]   Location 13[address] [facility] Attending Kaya Armendariz MD   Hosp Day # 0 PCP No pr COLONOSCOPY      Comment: tublar adenoma  11/2014: ECT PROVIDED Bilateral      Comment: multiple ECT's every 3 weeks or so last 3                years  1998: MASTECTOMY LEFT Left      Comment: left breast cancer with mastectomy, chemo and                ra non-tender, bowel sounds active all four quadrants,     no masses, no organomegaly   Extremities:   Extremities normal, atraumatic, no cyanosis or edema   Pulses:   2+ and symmetric all extremities   Skin:   Skin color, texture, turgor normal, no rashes or

## 2018-04-16 NOTE — PROGRESS NOTES
[facility] / [facility]  ECT Procedure Note    Estela Juarez Patient Status:  Outpatient   Age/Gender 69year old female MRN [id number]   Location [address] [facility] Attending Preethi Hdz MD   Hosp Day # 0 PCP No

## 2018-05-16 ENCOUNTER — HOSPITAL ENCOUNTER (OUTPATIENT)
Dept: POSTOP/PACU | Facility: HOSPITAL | Age: 78
Discharge: HOME | End: 2018-05-16
Attending: Other
Payer: MEDICARE

## 2018-05-16 VITALS
OXYGEN SATURATION: 94 % | SYSTOLIC BLOOD PRESSURE: 171 MMHG | HEART RATE: 101 BPM | HEIGHT: 62 IN | DIASTOLIC BLOOD PRESSURE: 87 MMHG | RESPIRATION RATE: 27 BRPM

## 2018-05-16 DIAGNOSIS — F31.64 SEVERE BIPOLAR I DISORDER, MOST RECENT EPISODE MIXED, WITH PSYCHOTIC FEATURES (HCC): ICD-10-CM

## 2018-05-16 DIAGNOSIS — F31.64 BIPOLAR I DISORDER, MOST RECENT EPISODE MIXED, SEVERE WITH PSYCHOTIC FEATURES (HCC): ICD-10-CM

## 2018-05-16 RX ORDER — MIDAZOLAM HYDROCHLORIDE 1 MG/ML
1 INJECTION INTRAMUSCULAR; INTRAVENOUS EVERY 5 MIN PRN
Status: ACTIVE | OUTPATIENT
Start: 2018-05-16 | End: 2018-05-16

## 2018-05-16 RX ORDER — HYDROMORPHONE HYDROCHLORIDE 1 MG/ML
0.4 INJECTION, SOLUTION INTRAMUSCULAR; INTRAVENOUS; SUBCUTANEOUS EVERY 5 MIN PRN
Status: ACTIVE | OUTPATIENT
Start: 2018-05-16 | End: 2018-05-16

## 2018-05-16 RX ORDER — DEXTROSE MONOHYDRATE 25 G/50ML
50 INJECTION, SOLUTION INTRAVENOUS
Status: DISCONTINUED | OUTPATIENT
Start: 2018-05-16 | End: 2018-05-16

## 2018-05-16 RX ORDER — GLYCOPYRROLATE 0.2 MG/ML
INJECTION, SOLUTION INTRAMUSCULAR; INTRAVENOUS
Status: COMPLETED
Start: 2018-05-16 | End: 2018-05-16

## 2018-05-16 RX ORDER — SODIUM CHLORIDE, SODIUM LACTATE, POTASSIUM CHLORIDE, CALCIUM CHLORIDE 600; 310; 30; 20 MG/100ML; MG/100ML; MG/100ML; MG/100ML
INJECTION, SOLUTION INTRAVENOUS CONTINUOUS
Status: DISCONTINUED | OUTPATIENT
Start: 2018-05-16 | End: 2018-05-18

## 2018-05-16 RX ORDER — DEXTROSE MONOHYDRATE 25 G/50ML
50 INJECTION, SOLUTION INTRAVENOUS
Status: DISCONTINUED | OUTPATIENT
Start: 2018-05-16 | End: 2018-05-18

## 2018-05-16 RX ORDER — NALOXONE HYDROCHLORIDE 0.4 MG/ML
80 INJECTION, SOLUTION INTRAMUSCULAR; INTRAVENOUS; SUBCUTANEOUS AS NEEDED
Status: ACTIVE | OUTPATIENT
Start: 2018-05-16 | End: 2018-05-16

## 2018-05-16 RX ORDER — GLYCOPYRROLATE 0.2 MG/ML
0.2 INJECTION, SOLUTION INTRAMUSCULAR; INTRAVENOUS ONCE
Status: COMPLETED | OUTPATIENT
Start: 2018-05-16 | End: 2018-05-16

## 2018-05-16 RX ORDER — ACETAMINOPHEN 500 MG
1000 TABLET ORAL ONCE AS NEEDED
Status: ACTIVE | OUTPATIENT
Start: 2018-05-16 | End: 2018-05-16

## 2018-05-16 RX ORDER — ONDANSETRON 2 MG/ML
4 INJECTION INTRAMUSCULAR; INTRAVENOUS AS NEEDED
Status: ACTIVE | OUTPATIENT
Start: 2018-05-16 | End: 2018-05-16

## 2018-05-16 RX ADMIN — GLYCOPYRROLATE 0.2 MG: 0.2 INJECTION, SOLUTION INTRAMUSCULAR; INTRAVENOUS at 07:19:00

## 2018-05-16 RX ADMIN — SODIUM CHLORIDE, SODIUM LACTATE, POTASSIUM CHLORIDE, CALCIUM CHLORIDE: 600; 310; 30; 20 INJECTION, SOLUTION INTRAVENOUS at 07:35:00

## 2018-05-16 NOTE — PROGRESS NOTES
[facility] / [facility]  ECT Procedure Note    Rosiland Spurling Patient Status:  Outpatient   Age/Gender 69year old female MRN [id number]   Location [address] [facility] Attending Jeffery Stacy MD   Hosp Day # 0 PCP No

## 2018-05-16 NOTE — PROGRESS NOTES
[facility] / [facility]  ECT History & Physical    Jena Ashia Patient Status:  Outpatient   Age/Gender 69year old female MRN [id number]   Location [address] [facility] Attending Ryan Hawkins MD   Hosp Day # 0 PCP No prim CATARACT Left      Comment: ? IOL  1998: CHEMOTHERAPY Left      Comment: left breast cancer with mastectomy, chemo and                radiation.   No date: COLONOSCOPY      Comment: tublar adenoma  11/2014: ECT PROVIDED Bilateral      Comment: multiple ECT' benefits and alternatives with the patient/family. They understand and agree to proceed with plan of care.     Johnny Coombs

## 2018-05-31 RX ORDER — ONDANSETRON 4 MG/1
4 TABLET, ORALLY DISINTEGRATING ORAL EVERY 6 HOURS PRN
COMMUNITY

## 2018-06-13 ENCOUNTER — HOSPITAL ENCOUNTER (OUTPATIENT)
Dept: POSTOP/PACU | Facility: HOSPITAL | Age: 78
Discharge: HOME | End: 2018-06-13
Attending: Other
Payer: MEDICARE

## 2018-06-13 VITALS
HEIGHT: 62 IN | SYSTOLIC BLOOD PRESSURE: 148 MMHG | RESPIRATION RATE: 22 BRPM | WEIGHT: 165 LBS | BODY MASS INDEX: 30.36 KG/M2 | TEMPERATURE: 98 F | HEART RATE: 103 BPM | DIASTOLIC BLOOD PRESSURE: 76 MMHG | OXYGEN SATURATION: 94 %

## 2018-06-13 DIAGNOSIS — F31.64 BIPOLAR AFFECTIVE DISORDER, MIXED, SEVERE, WITH PSYCHOTIC BEHAVIOR (HCC): ICD-10-CM

## 2018-06-13 RX ORDER — HYDROMORPHONE HYDROCHLORIDE 1 MG/ML
0.4 INJECTION, SOLUTION INTRAMUSCULAR; INTRAVENOUS; SUBCUTANEOUS EVERY 5 MIN PRN
Status: ACTIVE | OUTPATIENT
Start: 2018-06-13 | End: 2018-06-13

## 2018-06-13 RX ORDER — HYDROCODONE BITARTRATE AND ACETAMINOPHEN 10; 325 MG/1; MG/1
1 TABLET ORAL AS NEEDED
Status: DISCONTINUED | OUTPATIENT
Start: 2018-06-13 | End: 2018-06-15

## 2018-06-13 RX ORDER — SODIUM CHLORIDE, SODIUM LACTATE, POTASSIUM CHLORIDE, CALCIUM CHLORIDE 600; 310; 30; 20 MG/100ML; MG/100ML; MG/100ML; MG/100ML
INJECTION, SOLUTION INTRAVENOUS CONTINUOUS
Status: DISCONTINUED | OUTPATIENT
Start: 2018-06-13 | End: 2018-06-15

## 2018-06-13 RX ORDER — GLYCOPYRROLATE 0.2 MG/ML
INJECTION, SOLUTION INTRAMUSCULAR; INTRAVENOUS
Status: COMPLETED
Start: 2018-06-13 | End: 2018-06-13

## 2018-06-13 RX ORDER — NALOXONE HYDROCHLORIDE 0.4 MG/ML
80 INJECTION, SOLUTION INTRAMUSCULAR; INTRAVENOUS; SUBCUTANEOUS AS NEEDED
Status: ACTIVE | OUTPATIENT
Start: 2018-06-13 | End: 2018-06-13

## 2018-06-13 RX ORDER — GLYCOPYRROLATE 0.2 MG/ML
0.2 INJECTION, SOLUTION INTRAMUSCULAR; INTRAVENOUS ONCE
Status: COMPLETED | OUTPATIENT
Start: 2018-06-13 | End: 2018-06-13

## 2018-06-13 RX ORDER — DEXTROSE MONOHYDRATE 25 G/50ML
50 INJECTION, SOLUTION INTRAVENOUS
Status: DISCONTINUED | OUTPATIENT
Start: 2018-06-13 | End: 2018-06-15

## 2018-06-13 RX ORDER — DEXTROSE MONOHYDRATE 25 G/50ML
50 INJECTION, SOLUTION INTRAVENOUS
Status: DISCONTINUED | OUTPATIENT
Start: 2018-06-13 | End: 2018-06-13

## 2018-06-13 RX ORDER — ACETAMINOPHEN 500 MG
1000 TABLET ORAL ONCE
Status: DISCONTINUED | OUTPATIENT
Start: 2018-06-13 | End: 2018-06-15

## 2018-06-13 RX ORDER — METOCLOPRAMIDE HYDROCHLORIDE 5 MG/ML
10 INJECTION INTRAMUSCULAR; INTRAVENOUS AS NEEDED
Status: ACTIVE | OUTPATIENT
Start: 2018-06-13 | End: 2018-06-13

## 2018-06-13 RX ORDER — HYDROCODONE BITARTRATE AND ACETAMINOPHEN 10; 325 MG/1; MG/1
2 TABLET ORAL AS NEEDED
Status: DISCONTINUED | OUTPATIENT
Start: 2018-06-13 | End: 2018-06-15

## 2018-06-13 RX ORDER — ONDANSETRON 2 MG/ML
4 INJECTION INTRAMUSCULAR; INTRAVENOUS AS NEEDED
Status: ACTIVE | OUTPATIENT
Start: 2018-06-13 | End: 2018-06-13

## 2018-06-13 RX ADMIN — GLYCOPYRROLATE 0.2 MG: 0.2 INJECTION, SOLUTION INTRAMUSCULAR; INTRAVENOUS at 07:04:00

## 2018-06-13 NOTE — PROGRESS NOTES
[facility] / [facility]  ECT Procedure Note    Rhea Campbell Patient Status:  Outpatient   Age/Gender 69year old female MRN [id number]   Location [address] [facility] Attending Isidro Rangel MD   Hosp Day # 0 PCP No

## 2018-06-13 NOTE — PROGRESS NOTES
[facility] / [facility]  ECT History & Physical    Maine Kaylee Patient Status:  Outpatient   Age/Gender 69year old female MRN [id number]   Location [address] [facility] Attending Makenna Bee MD   Hosp Day # 0 PCP No prim date: COLONOSCOPY      Comment: tublar adenoma  11/2014: ECT PROVIDED Bilateral      Comment: multiple ECT's every 3 weeks or so last 3                years  1998: MASTECTOMY LEFT Left      Comment: left breast cancer with mastectomy, chemo and understand and agree to proceed with plan of care.     Doni Cavanaugh

## 2018-06-13 NOTE — PROGRESS NOTES
Spoke with daughter. Patient has been uncooperative for her and for staff for ECT treatment. Treatment unremarkable. Blood sugar post ECT treatment was 235. Dr. Ry Davila notified, no treatment advised.

## 2018-06-27 ENCOUNTER — HOSPITAL ENCOUNTER (OUTPATIENT)
Dept: GENERAL RADIOLOGY | Facility: HOSPITAL | Age: 78
Discharge: HOME | End: 2018-06-27
Attending: ANESTHESIOLOGY
Payer: MEDICARE

## 2018-06-27 ENCOUNTER — HOSPITAL ENCOUNTER (OUTPATIENT)
Dept: POSTOP/PACU | Facility: HOSPITAL | Age: 78
Discharge: HOME | End: 2018-06-27
Attending: Other
Payer: MEDICARE

## 2018-06-27 ENCOUNTER — HOSPITAL ENCOUNTER (OUTPATIENT)
Facility: HOSPITAL | Age: 78
Setting detail: OBSERVATION
Discharge: NURSING, REHAB OR LONG-TERM CARE | End: 2018-06-28
Attending: EMERGENCY MEDICINE | Admitting: HOSPITALIST
Payer: MEDICARE

## 2018-06-27 ENCOUNTER — APPOINTMENT (OUTPATIENT)
Dept: GENERAL RADIOLOGY | Facility: HOSPITAL | Age: 78
End: 2018-06-27
Attending: EMERGENCY MEDICINE
Payer: MEDICARE

## 2018-06-27 VITALS
DIASTOLIC BLOOD PRESSURE: 76 MMHG | SYSTOLIC BLOOD PRESSURE: 111 MMHG | OXYGEN SATURATION: 93 % | HEART RATE: 82 BPM | RESPIRATION RATE: 14 BRPM | TEMPERATURE: 99 F | HEIGHT: 62 IN

## 2018-06-27 DIAGNOSIS — F31.64 BIPOLAR I DISORDER, MOST RECENT EPISODE MIXED, SEVERE WITH PSYCHOTIC FEATURES (HCC): ICD-10-CM

## 2018-06-27 DIAGNOSIS — R09.02 HYPOXIA: Primary | ICD-10-CM

## 2018-06-27 DIAGNOSIS — F31.64 SEVERE BIPOLAR I DISORDER, MOST RECENT EPISODE MIXED, WITH PSYCHOTIC FEATURES (HCC): ICD-10-CM

## 2018-06-27 PROBLEM — E87.3 METABOLIC ALKALOSIS: Status: ACTIVE | Noted: 2018-06-27

## 2018-06-27 PROBLEM — R73.9 HYPERGLYCEMIA: Status: ACTIVE | Noted: 2018-06-27

## 2018-06-27 LAB
ALBUMIN SERPL-MCNC: 3.4 G/DL (ref 3.5–4.8)
ALLENS TEST: POSITIVE
ALP LIVER SERPL-CCNC: 93 U/L (ref 55–142)
ALT SERPL-CCNC: 34 U/L (ref 14–54)
ARTERIAL BLD GAS O2 SATURATION: 93 % (ref 92–100)
ARTERIAL BLOOD GAS BASE EXCESS: -2.1
ARTERIAL BLOOD GAS HCO3: 22.8 MEQ/L (ref 22–26)
ARTERIAL BLOOD GAS PCO2: 39 MM HG (ref 35–45)
ARTERIAL BLOOD GAS PH: 7.38 (ref 7.35–7.45)
ARTERIAL BLOOD GAS PO2: 70 MM HG (ref 80–105)
AST SERPL-CCNC: 26 U/L (ref 15–41)
ATRIAL RATE: 69 BPM
BASOPHILS # BLD AUTO: 0.04 X10(3) UL (ref 0–0.1)
BASOPHILS NFR BLD AUTO: 0.5 %
BILIRUB SERPL-MCNC: 0.6 MG/DL (ref 0.1–2)
BUN BLD-MCNC: 19 MG/DL (ref 8–20)
CALCIUM BLD-MCNC: 8.6 MG/DL (ref 8.3–10.3)
CALCULATED O2 SATURATION: 94 % (ref 92–100)
CARBOXYHEMOGLOBIN: 1.3 % SAT (ref 0–3)
CHLORIDE: 96 MMOL/L (ref 101–111)
CO2: 25 MMOL/L (ref 22–32)
CREAT BLD-MCNC: 1.12 MG/DL (ref 0.55–1.02)
D-DIMER: 0.41 UG/ML FEU (ref 0–0.49)
EOSINOPHIL # BLD AUTO: 0.1 X10(3) UL (ref 0–0.3)
EOSINOPHIL NFR BLD AUTO: 1.3 %
ERYTHROCYTE [DISTWIDTH] IN BLOOD BY AUTOMATED COUNT: 13.2 % (ref 11.5–16)
EST. AVERAGE GLUCOSE BLD GHB EST-MCNC: 217 MG/DL (ref 68–126)
GLUCOSE BLD-MCNC: 248 MG/DL (ref 65–99)
GLUCOSE BLD-MCNC: 251 MG/DL (ref 65–99)
GLUCOSE BLD-MCNC: 264 MG/DL (ref 70–99)
GLUCOSE BLD-MCNC: 289 MG/DL (ref 65–99)
HBA1C MFR BLD HPLC: 9.2 % (ref ?–5.7)
HCT VFR BLD AUTO: 34.9 % (ref 34–50)
HGB BLD-MCNC: 11.6 G/DL (ref 12–16)
IMMATURE GRANULOCYTE COUNT: 0.07 X10(3) UL (ref 0–1)
IMMATURE GRANULOCYTE RATIO %: 0.9 %
L/M: 3 L/MIN
LYMPHOCYTES # BLD AUTO: 1.05 X10(3) UL (ref 0.9–4)
LYMPHOCYTES NFR BLD AUTO: 13.5 %
M PROTEIN MFR SERPL ELPH: 8 G/DL (ref 6.1–8.3)
MCH RBC QN AUTO: 29 PG (ref 27–33.2)
MCHC RBC AUTO-ENTMCNC: 33.2 G/DL (ref 31–37)
MCV RBC AUTO: 87.3 FL (ref 81–100)
METHEMOGLOBIN: 0.6 % SAT (ref 0.4–1.5)
MONOCYTES # BLD AUTO: 0.45 X10(3) UL (ref 0.1–1)
MONOCYTES NFR BLD AUTO: 5.8 %
NEUTROPHIL ABS PRELIM: 6.08 X10 (3) UL (ref 1.3–6.7)
NEUTROPHILS # BLD AUTO: 6.08 X10(3) UL (ref 1.3–6.7)
NEUTROPHILS NFR BLD AUTO: 78 %
P AXIS: -3 DEGREES
P-R INTERVAL: 168 MS
P/F RATIO: 341.3 MMHG
PATIENT TEMPERATURE: 97.8 F
PLATELET # BLD AUTO: 234 10(3)UL (ref 150–450)
POTASSIUM SERPL-SCNC: 4.8 MMOL/L (ref 3.6–5.1)
PRO-BETA NATRIURETIC PEPTIDE: 206 PG/ML (ref ?–450)
Q-T INTERVAL: 428 MS
QRS DURATION: 90 MS
QTC CALCULATION (BEZET): 458 MS
R AXIS: -1 DEGREES
RBC # BLD AUTO: 4 X10(6)UL (ref 3.8–5.1)
RED CELL DISTRIBUTION WIDTH-SD: 42 FL (ref 35.1–46.3)
SODIUM SERPL-SCNC: 130 MMOL/L (ref 136–144)
T AXIS: 33 DEGREES
TOTAL HEMOGLOBIN: 11.4 G/DL (ref 11.7–16)
TROPONIN: <0.046 NG/ML (ref ?–0.05)
VENTRICULAR RATE: 69 BPM
WBC # BLD AUTO: 7.8 X10(3) UL (ref 4–13)

## 2018-06-27 PROCEDURE — 71046 X-RAY EXAM CHEST 2 VIEWS: CPT | Performed by: EMERGENCY MEDICINE

## 2018-06-27 PROCEDURE — 71045 X-RAY EXAM CHEST 1 VIEW: CPT | Performed by: ANESTHESIOLOGY

## 2018-06-27 PROCEDURE — 99220 INITIAL OBSERVATION CARE,LEVL III: CPT | Performed by: HOSPITALIST

## 2018-06-27 RX ORDER — ENOXAPARIN SODIUM 100 MG/ML
40 INJECTION SUBCUTANEOUS NIGHTLY
Status: DISCONTINUED | OUTPATIENT
Start: 2018-06-27 | End: 2018-06-28

## 2018-06-27 RX ORDER — DEXTROSE MONOHYDRATE 25 G/50ML
50 INJECTION, SOLUTION INTRAVENOUS
Status: DISCONTINUED | OUTPATIENT
Start: 2018-06-27 | End: 2018-06-27

## 2018-06-27 RX ORDER — GLYCOPYRROLATE 0.2 MG/ML
0.2 INJECTION, SOLUTION INTRAMUSCULAR; INTRAVENOUS ONCE
Status: COMPLETED | OUTPATIENT
Start: 2018-06-27 | End: 2018-06-27

## 2018-06-27 RX ORDER — METOPROLOL SUCCINATE 25 MG/1
25 TABLET, EXTENDED RELEASE ORAL
Status: DISCONTINUED | OUTPATIENT
Start: 2018-06-28 | End: 2018-06-28

## 2018-06-27 RX ORDER — KETOROLAC TROMETHAMINE 30 MG/ML
15 INJECTION, SOLUTION INTRAMUSCULAR; INTRAVENOUS EVERY 6 HOURS PRN
Status: DISCONTINUED | OUTPATIENT
Start: 2018-06-27 | End: 2018-06-29

## 2018-06-27 RX ORDER — ACETAMINOPHEN 500 MG
1000 TABLET ORAL ONCE AS NEEDED
Status: ACTIVE | OUTPATIENT
Start: 2018-06-27 | End: 2018-06-27

## 2018-06-27 RX ORDER — INSULIN ASPART 100 [IU]/ML
INJECTION, SOLUTION INTRAVENOUS; SUBCUTANEOUS
Status: COMPLETED
Start: 2018-06-27 | End: 2018-06-27

## 2018-06-27 RX ORDER — LEVOTHYROXINE SODIUM 0.1 MG/1
100 TABLET ORAL
Status: DISCONTINUED | OUTPATIENT
Start: 2018-06-28 | End: 2018-06-28

## 2018-06-27 RX ORDER — KETOROLAC TROMETHAMINE 30 MG/ML
30 INJECTION, SOLUTION INTRAMUSCULAR; INTRAVENOUS EVERY 6 HOURS PRN
Status: DISCONTINUED | OUTPATIENT
Start: 2018-06-27 | End: 2018-06-29

## 2018-06-27 RX ORDER — HYDROMORPHONE HYDROCHLORIDE 1 MG/ML
0.4 INJECTION, SOLUTION INTRAMUSCULAR; INTRAVENOUS; SUBCUTANEOUS EVERY 5 MIN PRN
Status: ACTIVE | OUTPATIENT
Start: 2018-06-27 | End: 2018-06-27

## 2018-06-27 RX ORDER — DIPHENHYDRAMINE HYDROCHLORIDE 50 MG/ML
12.5 INJECTION INTRAMUSCULAR; INTRAVENOUS AS NEEDED
Status: ACTIVE | OUTPATIENT
Start: 2018-06-27 | End: 2018-06-27

## 2018-06-27 RX ORDER — MIDAZOLAM HYDROCHLORIDE 1 MG/ML
1 INJECTION INTRAMUSCULAR; INTRAVENOUS EVERY 5 MIN PRN
Status: ACTIVE | OUTPATIENT
Start: 2018-06-27 | End: 2018-06-27

## 2018-06-27 RX ORDER — ALBUTEROL SULFATE 2.5 MG/3ML
2.5 SOLUTION RESPIRATORY (INHALATION) ONCE
Status: COMPLETED | OUTPATIENT
Start: 2018-06-27 | End: 2018-06-27

## 2018-06-27 RX ORDER — HYDRALAZINE HYDROCHLORIDE 50 MG/1
100 TABLET, FILM COATED ORAL 2 TIMES DAILY
Status: DISCONTINUED | OUTPATIENT
Start: 2018-06-27 | End: 2018-06-28

## 2018-06-27 RX ORDER — ACETAMINOPHEN 325 MG/1
650 TABLET ORAL EVERY 6 HOURS PRN
Status: DISCONTINUED | OUTPATIENT
Start: 2018-06-27 | End: 2018-06-28

## 2018-06-27 RX ORDER — HYDROCODONE BITARTRATE AND ACETAMINOPHEN 5; 325 MG/1; MG/1
2 TABLET ORAL AS NEEDED
Status: DISCONTINUED | OUTPATIENT
Start: 2018-06-27 | End: 2018-06-29

## 2018-06-27 RX ORDER — SODIUM CHLORIDE 9 MG/ML
INJECTION, SOLUTION INTRAVENOUS CONTINUOUS
Status: ACTIVE | OUTPATIENT
Start: 2018-06-27 | End: 2018-06-27

## 2018-06-27 RX ORDER — FUROSEMIDE 10 MG/ML
20 INJECTION INTRAMUSCULAR; INTRAVENOUS ONCE
Status: COMPLETED | OUTPATIENT
Start: 2018-06-27 | End: 2018-06-27

## 2018-06-27 RX ORDER — DEXAMETHASONE SODIUM PHOSPHATE 4 MG/ML
4 VIAL (ML) INJECTION AS NEEDED
Status: ACTIVE | OUTPATIENT
Start: 2018-06-27 | End: 2018-06-27

## 2018-06-27 RX ORDER — SODIUM CHLORIDE, SODIUM LACTATE, POTASSIUM CHLORIDE, CALCIUM CHLORIDE 600; 310; 30; 20 MG/100ML; MG/100ML; MG/100ML; MG/100ML
INJECTION, SOLUTION INTRAVENOUS CONTINUOUS
Status: DISCONTINUED | OUTPATIENT
Start: 2018-06-27 | End: 2018-06-29

## 2018-06-27 RX ORDER — ONDANSETRON 2 MG/ML
4 INJECTION INTRAMUSCULAR; INTRAVENOUS AS NEEDED
Status: ACTIVE | OUTPATIENT
Start: 2018-06-27 | End: 2018-06-27

## 2018-06-27 RX ORDER — METOCLOPRAMIDE HYDROCHLORIDE 5 MG/ML
10 INJECTION INTRAMUSCULAR; INTRAVENOUS AS NEEDED
Status: ACTIVE | OUTPATIENT
Start: 2018-06-27 | End: 2018-06-27

## 2018-06-27 RX ORDER — HYDROCODONE BITARTRATE AND ACETAMINOPHEN 5; 325 MG/1; MG/1
1 TABLET ORAL AS NEEDED
Status: DISCONTINUED | OUTPATIENT
Start: 2018-06-27 | End: 2018-06-29

## 2018-06-27 RX ORDER — LABETALOL HYDROCHLORIDE 5 MG/ML
5 INJECTION, SOLUTION INTRAVENOUS EVERY 5 MIN PRN
Status: ACTIVE | OUTPATIENT
Start: 2018-06-27 | End: 2018-06-27

## 2018-06-27 RX ORDER — MELATONIN
325 DAILY
Status: DISCONTINUED | OUTPATIENT
Start: 2018-06-28 | End: 2018-06-28

## 2018-06-27 RX ORDER — GLYCOPYRROLATE 0.2 MG/ML
INJECTION, SOLUTION INTRAMUSCULAR; INTRAVENOUS
Status: COMPLETED
Start: 2018-06-27 | End: 2018-06-27

## 2018-06-27 RX ORDER — DEXTROSE MONOHYDRATE 25 G/50ML
50 INJECTION, SOLUTION INTRAVENOUS
Status: DISCONTINUED | OUTPATIENT
Start: 2018-06-27 | End: 2018-06-28

## 2018-06-27 RX ORDER — ATORVASTATIN CALCIUM 10 MG/1
10 TABLET, FILM COATED ORAL NIGHTLY
Status: DISCONTINUED | OUTPATIENT
Start: 2018-06-27 | End: 2018-06-28

## 2018-06-27 RX ORDER — MEPERIDINE HYDROCHLORIDE 25 MG/ML
12.5 INJECTION INTRAMUSCULAR; INTRAVENOUS; SUBCUTANEOUS AS NEEDED
Status: DISCONTINUED | OUTPATIENT
Start: 2018-06-27 | End: 2018-06-29

## 2018-06-27 RX ORDER — SODIUM CHLORIDE 1000 MG
1 TABLET, SOLUBLE MISCELLANEOUS
Status: DISCONTINUED | OUTPATIENT
Start: 2018-06-27 | End: 2018-06-28

## 2018-06-27 RX ORDER — ALBUTEROL SULFATE 2.5 MG/3ML
SOLUTION RESPIRATORY (INHALATION)
Status: COMPLETED
Start: 2018-06-27 | End: 2018-06-27

## 2018-06-27 RX ORDER — IBUPROFEN 600 MG/1
600 TABLET ORAL ONCE AS NEEDED
Status: ACTIVE | OUTPATIENT
Start: 2018-06-27 | End: 2018-06-27

## 2018-06-27 RX ORDER — IPRATROPIUM BROMIDE AND ALBUTEROL SULFATE 2.5; .5 MG/3ML; MG/3ML
3 SOLUTION RESPIRATORY (INHALATION) ONCE
Status: DISCONTINUED | OUTPATIENT
Start: 2018-06-27 | End: 2018-06-27

## 2018-06-27 RX ORDER — SERTRALINE HYDROCHLORIDE 100 MG/1
100 TABLET, FILM COATED ORAL DAILY
Status: DISCONTINUED | OUTPATIENT
Start: 2018-06-28 | End: 2018-06-28

## 2018-06-27 RX ORDER — NALOXONE HYDROCHLORIDE 0.4 MG/ML
80 INJECTION, SOLUTION INTRAMUSCULAR; INTRAVENOUS; SUBCUTANEOUS AS NEEDED
Status: ACTIVE | OUTPATIENT
Start: 2018-06-27 | End: 2018-06-27

## 2018-06-27 RX ORDER — PREGABALIN 75 MG/1
75 CAPSULE ORAL 2 TIMES DAILY
Status: DISCONTINUED | OUTPATIENT
Start: 2018-06-27 | End: 2018-06-28

## 2018-06-27 RX ADMIN — SODIUM CHLORIDE, SODIUM LACTATE, POTASSIUM CHLORIDE, CALCIUM CHLORIDE: 600; 310; 30; 20 INJECTION, SOLUTION INTRAVENOUS at 07:22:00

## 2018-06-27 RX ADMIN — GLYCOPYRROLATE 0.2 MG: 0.2 INJECTION, SOLUTION INTRAMUSCULAR; INTRAVENOUS at 07:15:00

## 2018-06-27 RX ADMIN — SODIUM CHLORIDE, SODIUM LACTATE, POTASSIUM CHLORIDE, CALCIUM CHLORIDE: 600; 310; 30; 20 INJECTION, SOLUTION INTRAVENOUS at 07:14:00

## 2018-06-27 RX ADMIN — ALBUTEROL SULFATE 2.5 MG: 2.5 SOLUTION RESPIRATORY (INHALATION) at 10:06:00

## 2018-06-27 NOTE — PROGRESS NOTES
[employer] / [facility]  ECT History & Physical    Abby Moralez Patient Status:  Outpatient   Age/Gender 69year old female MRN [id number]   Location [address] [facility] Attending Akash Swanson MD   Hosp Day # 0 PCP No prim date: COLONOSCOPY      Comment: tublar adenoma  11/2014: ECT PROVIDED Bilateral      Comment: multiple ECT's every 3 weeks or so last 3                years  1998: MASTECTOMY LEFT Left      Comment: left breast cancer with mastectomy, chemo and agree to proceed with plan of care.     [facility]

## 2018-06-27 NOTE — PROGRESS NOTES
Low oxygen saturations post ECT treatment today. Albuterol neb given, CXR done. Stable on 3L nasal cannula,  family updated. To ER per Dr. Rohit Gamboa. ER charge nurse given report.

## 2018-06-27 NOTE — PROGRESS NOTES
NURSING ADMISSION NOTE      Patient admitted via Cart  Oriented to room. Safety precautions initiated. Bed in low position. Call light in reach. Patient alert and oriented x 1-2. Confused. Daughter at bedside to assist with admission navigator.  Nikunj López

## 2018-06-27 NOTE — ED PROVIDER NOTES
Patient Seen in: [facility] Emergency Department    History   Patient presents with:  Dyspnea RENE SOB (respiratory)    Stated Complaint: poor oxygenation    HPI    52-year-old female complaining of low oxygen.   This patient has a history of schizoph CATARACT Left      Comment: ? IOL  1998: CHEMOTHERAPY Left      Comment: left breast cancer with mastectomy, chemo and                radiation.   No date: COLONOSCOPY      Comment: tublar adenoma  11/2014: ECT PROVIDED Bilateral      Comment: multiple ECT' Glucose 264 (*)     Creatinine 1.12 (*)     GFR, Non- 47 (*)     GFR, -American 55 (*)     Albumin 3.4 (*)     Sodium 130 (*)     Chloride 96 (*)     All other components within normal limits   ARTERIAL BLOOD GAS - Abnormal; Notable air she will be admitted for further evaluation          Disposition and Plan     Clinical Impression:  Hypoxia  (primary encounter diagnosis)  Severe bipolar I disorder, most recent episode mixed, with psychotic features ([facility] [address]    Disposition:  There is no

## 2018-06-27 NOTE — ED INITIAL ASSESSMENT (HPI)
Pt had ect treatment and was in pacu post procedure after receiving propofol for the procedure and had decreased o2 sats. After a couple hours o2 sats remained in the 80's. Currently 88 on room air.

## 2018-06-27 NOTE — H&P
TR HOSPITALIST  History and Physical     Sixtospencer Dobbs Patient Status:  Observation    1940 MRN [id number]   [facility] 5NW-A Attending Cari Rees MD   Hosp Day # 0 PCP Adolph Lora MD     Chief Complaint: hypoxia    Hist II or unspecified type diabetes mellitus without mention of complication, not stated as uncontrolled    • Unspecified disorder of thyroid    • Unspecified essential hypertension    • Unspecified vascular insufficiency of intestine         Past Surgical His Becca Richardson MD    Or        glucose (DEX4) oral liquid 30 g 30 g Oral Q15 Min PRN Becca Richardson MD    Or        Glucose-Vitamin C (DEX-4) 4-0.006 g chewable tab 8 tablet 8 tablet Oral Q15 Min PRN Becca Richardson MD    lactated ringers infusion  In Tab Take 1 tablet by mouth once a week. Disp:  Rfl:    ondansetron 4 MG Oral Tablet Dispersible Take 4 mg by mouth every 6 (six) hours as needed for Nausea.  Disp:  Rfl:    Levothyroxine Sodium 100 MCG Oral Tab Take 100 mcg by mouth before breakfast. Disp: (PRAVACHOL) 40 MG Oral Tab Take 40 mg by mouth nightly. Indications: Type II B Hyperlipidemia Disp:  Rfl:        Review of Systems:   A comprehensive 14 point review of systems was completed. Pertinent positives and negatives noted in the HPI.     Physic bnp negative  2. Is obese which may falsely decrease BNP but does not appear to be in overt fluid overload  3. Given Lasix in ed. 4. Obtain echo  5. Wean O2  6. Labs tomorrow  2. Schizophrenia  3. Depression s/p ECT  4. Bipolar 1 disorder  5.  DM2 on insu

## 2018-06-27 NOTE — PROGRESS NOTES
[facility] / [facility]  ECT Procedure Note    Brian Live Patient Status:  Outpatient   Age/Gender 69year old female MRN [id number]   Location [address] [facility] Attending Paolo Hernandez MD   Hosp Day # 0 PCP No

## 2018-06-28 ENCOUNTER — APPOINTMENT (OUTPATIENT)
Dept: CV DIAGNOSTICS | Facility: HOSPITAL | Age: 78
End: 2018-06-28
Attending: HOSPITALIST
Payer: MEDICARE

## 2018-06-28 VITALS
DIASTOLIC BLOOD PRESSURE: 43 MMHG | BODY MASS INDEX: 30.36 KG/M2 | OXYGEN SATURATION: 99 % | HEIGHT: 62 IN | SYSTOLIC BLOOD PRESSURE: 100 MMHG | TEMPERATURE: 99 F | HEART RATE: 66 BPM | RESPIRATION RATE: 18 BRPM | WEIGHT: 165 LBS

## 2018-06-28 LAB
BASOPHILS # BLD AUTO: 0.04 X10(3) UL (ref 0–0.1)
BASOPHILS NFR BLD AUTO: 0.6 %
BUN BLD-MCNC: 20 MG/DL (ref 8–20)
CALCIUM BLD-MCNC: 8.9 MG/DL (ref 8.3–10.3)
CHLORIDE: 99 MMOL/L (ref 101–111)
CO2: 27 MMOL/L (ref 22–32)
CREAT BLD-MCNC: 1.06 MG/DL (ref 0.55–1.02)
EOSINOPHIL # BLD AUTO: 0.29 X10(3) UL (ref 0–0.3)
EOSINOPHIL NFR BLD AUTO: 4.2 %
ERYTHROCYTE [DISTWIDTH] IN BLOOD BY AUTOMATED COUNT: 13 % (ref 11.5–16)
GLUCOSE BLD-MCNC: 313 MG/DL (ref 65–99)
GLUCOSE BLD-MCNC: 434 MG/DL (ref 65–99)
GLUCOSE BLD-MCNC: 66 MG/DL (ref 70–99)
GLUCOSE BLD-MCNC: 96 MG/DL (ref 65–99)
HAV IGM SER QL: 2 MG/DL (ref 1.8–2.5)
HCT VFR BLD AUTO: 34.5 % (ref 34–50)
HGB BLD-MCNC: 11.1 G/DL (ref 12–16)
IMMATURE GRANULOCYTE COUNT: 0.1 X10(3) UL (ref 0–1)
IMMATURE GRANULOCYTE RATIO %: 1.4 %
LYMPHOCYTES # BLD AUTO: 1.56 X10(3) UL (ref 0.9–4)
LYMPHOCYTES NFR BLD AUTO: 22.6 %
MCH RBC QN AUTO: 28.2 PG (ref 27–33.2)
MCHC RBC AUTO-ENTMCNC: 32.2 G/DL (ref 31–37)
MCV RBC AUTO: 87.6 FL (ref 81–100)
MONOCYTES # BLD AUTO: 0.58 X10(3) UL (ref 0.1–1)
MONOCYTES NFR BLD AUTO: 8.4 %
NEUTROPHIL ABS PRELIM: 4.33 X10 (3) UL (ref 1.3–6.7)
NEUTROPHILS # BLD AUTO: 4.33 X10(3) UL (ref 1.3–6.7)
NEUTROPHILS NFR BLD AUTO: 62.8 %
PLATELET # BLD AUTO: 253 10(3)UL (ref 150–450)
POTASSIUM SERPL-SCNC: 4 MMOL/L (ref 3.6–5.1)
RBC # BLD AUTO: 3.94 X10(6)UL (ref 3.8–5.1)
RED CELL DISTRIBUTION WIDTH-SD: 42 FL (ref 35.1–46.3)
SODIUM SERPL-SCNC: 133 MMOL/L (ref 136–144)
WBC # BLD AUTO: 6.9 X10(3) UL (ref 4–13)

## 2018-06-28 PROCEDURE — 99217 OBSERVATION CARE DISCHARGE: CPT | Performed by: HOSPITALIST

## 2018-06-28 PROCEDURE — 93306 TTE W/DOPPLER COMPLETE: CPT | Performed by: HOSPITALIST

## 2018-06-28 RX ORDER — OLANZAPINE 7.5 MG/1
7.5 TABLET ORAL NIGHTLY
COMMUNITY
End: 2019-12-17

## 2018-06-28 RX ORDER — OLANZAPINE 10 MG/1
10 TABLET ORAL NIGHTLY
COMMUNITY
End: 2019-12-17

## 2018-06-28 NOTE — PROGRESS NOTES
NURSING DISCHARGE NOTE    Discharged Nursing home via Ambulance. Accompanied by Support staff  Belongings Taken by patient/family. VSS. Iv discontinued. Discharge instructions and prescriptions given to patient.  Report called and given to [facility]

## 2018-06-28 NOTE — DISCHARGE SUMMARY
[facility] HOSPITALIST  DISCHARGE SUMMARY     Anegl Grigsby Patient Status:  Observation    1940 MRN [id number]   [facility] 5NW-A Attending Guillermo Theodore MD   Hosp Day # 0 PCP Jillian Lira MD     Date of Admission: 2018  Date of cardiology. Procedures during hospitalization:   • ECHO    Incidental or significant findings and recommendations (brief descriptions):  • Grade 1 diastolic dysfunction.  F/u as outpt and outpt cardiac eval.     Lab/Test results pending at Discharge:   · Caps  Generic drug:  pregabalin      Take 75 mg by mouth 2 (two) times daily. Refills:  0     MetFORMIN HCl 1000 MG Tabs  Commonly known as:  GLUCOPHAGE      Take 1,000 mg by mouth 2 (two) times daily with meals.    Refills:  0     Metoprolol Succinate ER

## 2018-06-28 NOTE — CM/SW NOTE
Spoke with pt's RN who stated pt is ready to return to NH today. Pt is a long-term care resident at [facility]. Updates sent to NH via [address]. Contacted NH and confirmed they are able to accept pt for return today.   Transportation arranged v

## 2018-06-28 NOTE — PROGRESS NOTES
06/28/18 0932   Clinical Encounter Type   Visited With Health care provider  (ULICES Parisi)    responded to the POLST consult. Checked in w/ patient's ULICES Parisi regarding patient's decisional capacity/status.  The patient was unable to sign POLST form

## 2018-06-28 NOTE — PHYSICAL THERAPY NOTE
PHYSICAL THERAPY QUICK EVALUATION - INPATIENT    Room Number: 763/282-N  Evaluation Date: 6/28/2018  Presenting Problem: hypoxia  Physician Order: PT Eval and Treat    Problem List  Principal Problem:    Hypoxia  Active Problems:    Severe bipolar I diso date: COLONOSCOPY      Comment: tublar adenoma  11/2014: ECT PROVIDED Bilateral      Comment: multiple ECT's every 3 weeks or so last 3                years  1998: MASTECTOMY LEFT Left      Comment: left breast cancer with mastectomy, chemo and 41.77%   Standardized Score (AM-PAC Scale): 45.44   CMS Modifier (G-Code): CK      FUNCTIONAL ABILITY STATUS  Gait Assessment  Gait Assistance: Supervision  Distance (ft): 10ftx2  Assistive Device: Rolling walker  Pattern: Within Functional Limits

## 2018-06-29 NOTE — CM/SW NOTE
Patient discharged on 6/28/18 as previously planned.        06/29/18 0800   Discharge disposition   Expected discharge disposition Skilled Nurs   Name of Facillity/Home Care/Hospice ([address])   Discharge transportation Other (comment)  ([facility]

## 2018-07-16 VITALS — HEIGHT: 62 IN

## 2018-07-23 ENCOUNTER — HOSPITAL ENCOUNTER (OUTPATIENT)
Dept: POSTOP/PACU | Facility: HOSPITAL | Age: 78
Discharge: HOME | End: 2018-07-23
Attending: Other

## 2018-07-23 DIAGNOSIS — F31.64 SEVERE BIPOLAR I DISORDER, MOST RECENT EPISODE MIXED, WITH PSYCHOTIC FEATURES (HCC): ICD-10-CM

## 2018-07-23 RX ORDER — GLYCOPYRROLATE 0.2 MG/ML
0.2 INJECTION, SOLUTION INTRAMUSCULAR; INTRAVENOUS ONCE
Status: DISCONTINUED | OUTPATIENT
Start: 2018-07-23 | End: 2018-07-25

## 2018-07-23 RX ORDER — SODIUM CHLORIDE, SODIUM LACTATE, POTASSIUM CHLORIDE, CALCIUM CHLORIDE 600; 310; 30; 20 MG/100ML; MG/100ML; MG/100ML; MG/100ML
INJECTION, SOLUTION INTRAVENOUS CONTINUOUS
Status: DISCONTINUED | OUTPATIENT
Start: 2018-07-23 | End: 2018-07-25

## 2018-07-23 RX ORDER — DEXTROSE MONOHYDRATE 25 G/50ML
50 INJECTION, SOLUTION INTRAVENOUS
Status: DISCONTINUED | OUTPATIENT
Start: 2018-07-23 | End: 2018-07-25

## 2018-07-23 NOTE — OR PREOP
Treatment not performed, pt needs new Cardiology Consult/Clearance to resume ECT treatments at this time. Discussed this with pt's daughter at length. Anel Min who was not aware of needed consult.  Pt discharged back to family, wheelchair escort given to radha

## 2018-07-24 NOTE — PROGRESS NOTES
Patient showed up for ECT today but had not received cardiac clearance. Patient did not receive ECT procedure was not performed. Nurse discussed with patient's daughter.   Apparently this information had not been communicated to the daughter from the nurs

## 2018-08-01 ENCOUNTER — HOSPITAL ENCOUNTER (OUTPATIENT)
Dept: POSTOP/PACU | Facility: HOSPITAL | Age: 78
Discharge: HOME | End: 2018-08-01
Attending: Other
Payer: MEDICARE

## 2018-08-01 VITALS
OXYGEN SATURATION: 94 % | HEART RATE: 91 BPM | DIASTOLIC BLOOD PRESSURE: 91 MMHG | TEMPERATURE: 98 F | SYSTOLIC BLOOD PRESSURE: 178 MMHG | HEIGHT: 62 IN | RESPIRATION RATE: 16 BRPM

## 2018-08-01 DIAGNOSIS — F31.64 SEVERE BIPOLAR I DISORDER, MOST RECENT EPISODE MIXED, WITH PSYCHOTIC FEATURES (HCC): ICD-10-CM

## 2018-08-01 DIAGNOSIS — F31.64 BIPOLAR I DISORDER, MOST RECENT EPISODE MIXED, SEVERE WITH PSYCHOTIC FEATURES (HCC): ICD-10-CM

## 2018-08-01 LAB
GLUCOSE BLD-MCNC: 177 MG/DL (ref 65–99)
GLUCOSE BLD-MCNC: 187 MG/DL (ref 65–99)

## 2018-08-01 RX ORDER — SODIUM CHLORIDE, SODIUM LACTATE, POTASSIUM CHLORIDE, CALCIUM CHLORIDE 600; 310; 30; 20 MG/100ML; MG/100ML; MG/100ML; MG/100ML
INJECTION, SOLUTION INTRAVENOUS CONTINUOUS
Status: DISCONTINUED | OUTPATIENT
Start: 2018-08-01 | End: 2018-08-03

## 2018-08-01 RX ORDER — GLYCOPYRROLATE 0.2 MG/ML
INJECTION, SOLUTION INTRAMUSCULAR; INTRAVENOUS
Status: COMPLETED
Start: 2018-08-01 | End: 2018-08-01

## 2018-08-01 RX ORDER — GLYCOPYRROLATE 0.2 MG/ML
0.2 INJECTION, SOLUTION INTRAMUSCULAR; INTRAVENOUS ONCE
Status: COMPLETED | OUTPATIENT
Start: 2018-08-01 | End: 2018-08-01

## 2018-08-01 RX ORDER — DEXTROSE MONOHYDRATE 25 G/50ML
50 INJECTION, SOLUTION INTRAVENOUS
Status: DISCONTINUED | OUTPATIENT
Start: 2018-08-01 | End: 2018-08-03

## 2018-08-01 RX ADMIN — SODIUM CHLORIDE, SODIUM LACTATE, POTASSIUM CHLORIDE, CALCIUM CHLORIDE: 600; 310; 30; 20 INJECTION, SOLUTION INTRAVENOUS at 07:30:00

## 2018-08-01 RX ADMIN — GLYCOPYRROLATE 0.2 MG: 0.2 INJECTION, SOLUTION INTRAMUSCULAR; INTRAVENOUS at 07:30:00

## 2018-08-01 NOTE — PROGRESS NOTES
[facility] / [facility]  ECT History & Physical    Rakesh King Patient Status:  Outpatient   Age/Gender 69year old female MRN [id number]   Location [address] [facility] Attending Allison Valentino MD   Hosp Day # 0 PCP Akin Santos radiation.   No date: COLONOSCOPY      Comment: tublar adenoma  11/2014: ECT PROVIDED Bilateral      Comment: multiple ECT's every 3 weeks or so last 3                years  1998: MASTECTOMY LEFT Left      Comment: left breast cancer with mastectomy, chemo to proceed with plan of care.     Alec Peters

## 2018-08-01 NOTE — PROGRESS NOTES
[facility] / [facility]  ECT Procedure Note    Pablito Dillard Patient Status:  Outpatient   Age/Gender 69year old female MRN [id number]   Location [address] [facility] Attending Pino Francis MD   Hosp Day # 0 PCP Ta

## 2018-08-22 ENCOUNTER — HOSPITAL ENCOUNTER (OUTPATIENT)
Dept: POSTOP/PACU | Facility: HOSPITAL | Age: 78
Discharge: HOME | End: 2018-08-22
Attending: Other
Payer: MEDICARE

## 2018-08-22 VITALS
HEIGHT: 62 IN | SYSTOLIC BLOOD PRESSURE: 147 MMHG | RESPIRATION RATE: 20 BRPM | DIASTOLIC BLOOD PRESSURE: 80 MMHG | TEMPERATURE: 98 F | HEART RATE: 85 BPM | OXYGEN SATURATION: 95 %

## 2018-08-22 DIAGNOSIS — F31.2 SEVERE MANIC BIPOLAR I DISORDER WITH PSYCHOTIC FEATURES (HCC): ICD-10-CM

## 2018-08-22 LAB
GLUCOSE BLD-MCNC: 178 MG/DL (ref 65–99)
GLUCOSE BLD-MCNC: 218 MG/DL (ref 65–99)

## 2018-08-22 RX ORDER — NALOXONE HYDROCHLORIDE 0.4 MG/ML
80 INJECTION, SOLUTION INTRAMUSCULAR; INTRAVENOUS; SUBCUTANEOUS AS NEEDED
Status: ACTIVE | OUTPATIENT
Start: 2018-08-22 | End: 2018-08-22

## 2018-08-22 RX ORDER — LABETALOL HYDROCHLORIDE 5 MG/ML
5 INJECTION, SOLUTION INTRAVENOUS EVERY 5 MIN PRN
Status: ACTIVE | OUTPATIENT
Start: 2018-08-22 | End: 2018-08-22

## 2018-08-22 RX ORDER — ACETAMINOPHEN 500 MG
1000 TABLET ORAL ONCE
Status: DISCONTINUED | OUTPATIENT
Start: 2018-08-22 | End: 2018-08-24

## 2018-08-22 RX ORDER — SODIUM CHLORIDE, SODIUM LACTATE, POTASSIUM CHLORIDE, CALCIUM CHLORIDE 600; 310; 30; 20 MG/100ML; MG/100ML; MG/100ML; MG/100ML
INJECTION, SOLUTION INTRAVENOUS CONTINUOUS
Status: DISCONTINUED | OUTPATIENT
Start: 2018-08-22 | End: 2018-08-24

## 2018-08-22 RX ORDER — MIDAZOLAM HYDROCHLORIDE 1 MG/ML
1 INJECTION INTRAMUSCULAR; INTRAVENOUS EVERY 5 MIN PRN
Status: ACTIVE | OUTPATIENT
Start: 2018-08-22 | End: 2018-08-22

## 2018-08-22 RX ORDER — ACETAMINOPHEN 500 MG
1000 TABLET ORAL ONCE AS NEEDED
Status: ACTIVE | OUTPATIENT
Start: 2018-08-22 | End: 2018-08-22

## 2018-08-22 RX ORDER — DEXTROSE MONOHYDRATE 25 G/50ML
50 INJECTION, SOLUTION INTRAVENOUS
Status: DISCONTINUED | OUTPATIENT
Start: 2018-08-22 | End: 2018-08-24

## 2018-08-22 RX ORDER — MEPERIDINE HYDROCHLORIDE 25 MG/ML
12.5 INJECTION INTRAMUSCULAR; INTRAVENOUS; SUBCUTANEOUS AS NEEDED
Status: DISCONTINUED | OUTPATIENT
Start: 2018-08-22 | End: 2018-08-24

## 2018-08-22 RX ORDER — GLYCOPYRROLATE 0.2 MG/ML
INJECTION, SOLUTION INTRAMUSCULAR; INTRAVENOUS
Status: COMPLETED
Start: 2018-08-22 | End: 2018-08-22

## 2018-08-22 RX ORDER — MORPHINE SULFATE 4 MG/ML
2 INJECTION, SOLUTION INTRAMUSCULAR; INTRAVENOUS EVERY 5 MIN PRN
Status: ACTIVE | OUTPATIENT
Start: 2018-08-22 | End: 2018-08-22

## 2018-08-22 RX ORDER — GLYCOPYRROLATE 0.2 MG/ML
0.2 INJECTION, SOLUTION INTRAMUSCULAR; INTRAVENOUS ONCE
Status: COMPLETED | OUTPATIENT
Start: 2018-08-22 | End: 2018-08-22

## 2018-08-22 RX ORDER — ONDANSETRON 2 MG/ML
4 INJECTION INTRAMUSCULAR; INTRAVENOUS AS NEEDED
Status: ACTIVE | OUTPATIENT
Start: 2018-08-22 | End: 2018-08-22

## 2018-08-22 RX ADMIN — GLYCOPYRROLATE 0.2 MG: 0.2 INJECTION, SOLUTION INTRAMUSCULAR; INTRAVENOUS at 06:24:00

## 2018-08-22 NOTE — PROGRESS NOTES
[facility] / [facility]  ECT History & Physical    Vu Mcfarland Patient Status:  Outpatient   Age/Gender 69year old female MRN [id number]   Location [address] [facility] Attending Kaya Armendariz MD   Hosp Day # 0 PCP Saeed Rodriguez adenoma  11/2014: ECT PROVIDED Bilateral      Comment: multiple ECT's every 3 weeks or so last 3                years  1998: MASTECTOMY LEFT Left      Comment: left breast cancer with mastectomy, chemo and                radiation.   3/3/17: OTHER      Comm

## 2018-08-22 NOTE — PROGRESS NOTES
[facility] / [facility]  ECT Procedure Note    Rosiland Spurling Patient Status:  Outpatient   Age/Gender 69year old female MRN [id number]   Location [address] [facility] Attending Jeffery Stacy MD   Hosp Day # 0 PCP Ta

## 2018-09-12 ENCOUNTER — HOSPITAL ENCOUNTER (OUTPATIENT)
Facility: HOSPITAL | Age: 78
Setting detail: OBSERVATION
Discharge: NURSING, REHAB OR LONG-TERM CARE | End: 2018-09-13
Attending: EMERGENCY MEDICINE | Admitting: INTERNAL MEDICINE
Payer: MEDICARE

## 2018-09-12 DIAGNOSIS — R73.9 HYPERGLYCEMIA: Primary | ICD-10-CM

## 2018-09-12 LAB
ALBUMIN SERPL-MCNC: 3.3 G/DL (ref 3.5–4.8)
ALBUMIN/GLOB SERPL: 0.8 {RATIO} (ref 1–2)
ALP LIVER SERPL-CCNC: 83 U/L (ref 55–142)
ALT SERPL-CCNC: 29 U/L (ref 14–54)
ANION GAP SERPL CALC-SCNC: 2 MMOL/L (ref 0–18)
ANION GAP SERPL CALC-SCNC: 6 MMOL/L (ref 0–18)
AST SERPL-CCNC: 32 U/L (ref 15–41)
ATRIAL RATE: 74 BPM
BASOPHILS # BLD AUTO: 0.06 X10(3) UL (ref 0–0.1)
BASOPHILS NFR BLD AUTO: 0.9 %
BILIRUB SERPL-MCNC: 0.3 MG/DL (ref 0.1–2)
BILIRUB UR QL STRIP.AUTO: NEGATIVE
BUN BLD-MCNC: 30 MG/DL (ref 8–20)
BUN BLD-MCNC: 32 MG/DL (ref 8–20)
BUN/CREAT SERPL: 18.6 (ref 10–20)
BUN/CREAT SERPL: 19.7 (ref 10–20)
CALCIUM BLD-MCNC: 8.1 MG/DL (ref 8.3–10.3)
CALCIUM BLD-MCNC: 8.5 MG/DL (ref 8.3–10.3)
CHLORIDE SERPL-SCNC: 100 MMOL/L (ref 101–111)
CHLORIDE SERPL-SCNC: 102 MMOL/L (ref 101–111)
CLARITY UR REFRACT.AUTO: CLEAR
CO2 SERPL-SCNC: 21 MMOL/L (ref 22–32)
CO2 SERPL-SCNC: 21 MMOL/L (ref 22–32)
COLOR UR AUTO: YELLOW
CREAT BLD-MCNC: 1.52 MG/DL (ref 0.55–1.02)
CREAT BLD-MCNC: 1.72 MG/DL (ref 0.55–1.02)
EOSINOPHIL # BLD AUTO: 0.31 X10(3) UL (ref 0–0.3)
EOSINOPHIL NFR BLD AUTO: 4.8 %
ERYTHROCYTE [DISTWIDTH] IN BLOOD BY AUTOMATED COUNT: 13.5 % (ref 11.5–16)
GLOBULIN PLAS-MCNC: 4.1 G/DL (ref 2.5–4)
GLUCOSE BLD-MCNC: 117 MG/DL (ref 65–99)
GLUCOSE BLD-MCNC: 119 MG/DL (ref 70–99)
GLUCOSE BLD-MCNC: 150 MG/DL (ref 65–99)
GLUCOSE BLD-MCNC: 157 MG/DL (ref 65–99)
GLUCOSE BLD-MCNC: 221 MG/DL (ref 70–99)
GLUCOSE BLD-MCNC: 271 MG/DL (ref 65–99)
GLUCOSE BLD-MCNC: 90 MG/DL (ref 65–99)
GLUCOSE UR STRIP.AUTO-MCNC: 150 MG/DL
HCT VFR BLD AUTO: 33.6 % (ref 34–50)
HGB BLD-MCNC: 11.3 G/DL (ref 12–16)
IMMATURE GRANULOCYTE COUNT: 0.05 X10(3) UL (ref 0–1)
IMMATURE GRANULOCYTE RATIO %: 0.8 %
KETONES UR STRIP.AUTO-MCNC: NEGATIVE MG/DL
LEUKOCYTE ESTERASE UR QL STRIP.AUTO: NEGATIVE
LYMPHOCYTES # BLD AUTO: 1.6 X10(3) UL (ref 0.9–4)
LYMPHOCYTES NFR BLD AUTO: 24.8 %
M PROTEIN MFR SERPL ELPH: 7.4 G/DL (ref 6.1–8.3)
MCH RBC QN AUTO: 29 PG (ref 27–33.2)
MCHC RBC AUTO-ENTMCNC: 33.6 G/DL (ref 31–37)
MCV RBC AUTO: 86.4 FL (ref 81–100)
MONOCYTES # BLD AUTO: 0.52 X10(3) UL (ref 0.1–1)
MONOCYTES NFR BLD AUTO: 8.1 %
NEUTROPHIL ABS PRELIM: 3.91 X10 (3) UL (ref 1.3–6.7)
NEUTROPHILS # BLD AUTO: 3.91 X10(3) UL (ref 1.3–6.7)
NEUTROPHILS NFR BLD AUTO: 60.6 %
NITRITE UR QL STRIP.AUTO: NEGATIVE
OSMOLALITY SERPL CALC.SUM OF ELEC: 267 MOSM/KG (ref 275–295)
OSMOLALITY SERPL CALC.SUM OF ELEC: 278 MOSM/KG (ref 275–295)
P AXIS: 16 DEGREES
P-R INTERVAL: 166 MS
PH UR STRIP.AUTO: 5 [PH] (ref 4.5–8)
PLATELET # BLD AUTO: 206 10(3)UL (ref 150–450)
POTASSIUM SERPL-SCNC: 4.9 MMOL/L (ref 3.6–5.1)
POTASSIUM SERPL-SCNC: 5.9 MMOL/L (ref 3.6–5.1)
PROT UR STRIP.AUTO-MCNC: NEGATIVE MG/DL
Q-T INTERVAL: 388 MS
QRS DURATION: 82 MS
QTC CALCULATION (BEZET): 430 MS
R AXIS: 11 DEGREES
RBC # BLD AUTO: 3.89 X10(6)UL (ref 3.8–5.1)
RBC UR QL AUTO: NEGATIVE
RED CELL DISTRIBUTION WIDTH-SD: 42.5 FL (ref 35.1–46.3)
SODIUM SERPL-SCNC: 125 MMOL/L (ref 136–144)
SODIUM SERPL-SCNC: 127 MMOL/L (ref 136–144)
SP GR UR STRIP.AUTO: 1.01 (ref 1–1.03)
T AXIS: 48 DEGREES
UROBILINOGEN UR STRIP.AUTO-MCNC: <2 MG/DL
VENTRICULAR RATE: 74 BPM
WBC # BLD AUTO: 6.5 X10(3) UL (ref 4–13)

## 2018-09-12 PROCEDURE — 99220 INITIAL OBSERVATION CARE,LEVL III: CPT | Performed by: INTERNAL MEDICINE

## 2018-09-12 RX ORDER — SODIUM CHLORIDE 9 MG/ML
1000 INJECTION, SOLUTION INTRAVENOUS ONCE
Status: COMPLETED | OUTPATIENT
Start: 2018-09-12 | End: 2018-09-12

## 2018-09-12 RX ORDER — INSULIN ASPART 100 [IU]/ML
0.1 INJECTION, SOLUTION INTRAVENOUS; SUBCUTANEOUS ONCE
Status: COMPLETED | OUTPATIENT
Start: 2018-09-12 | End: 2018-09-12

## 2018-09-12 NOTE — ED NOTES
Unable to start IV, per family pt usually has IV started with ultrasound. Debbie ELENA notified and will attempt IV with ultrasound. Family at bedside.

## 2018-09-12 NOTE — ED PROVIDER NOTES
Patient Seen in: [facility] Emergency Department    History   Patient presents with:  Hyperglycemia (metabolic)    Stated Complaint: HYPERGLYCEMIC    HPI    Ally Galvan is a pleasant 50-year-old female coming with complaints of hyperglycemia.   Patient Comment:  ? IOL  1998: CHEMOTHERAPY; Left      Comment:  left breast cancer with mastectomy, chemo and radiation. No date: COLONOSCOPY      Comment:  tublar adenoma  11/2014: ECT PROVIDED;  Bilateral      Comment:  multiple ECT's every 3 weeks or so last 3 Albumin 3.3 (*)     Globulin  4.1 (*)     A/G Ratio 0.8 (*)     All other components within normal limits   BASIC METABOLIC PANEL (8) - Abnormal; Notable for the following components:    Glucose 119 (*)     Sodium 125 (*)     CO2 21.0 (*)     BUN 30 (*) potassium.   Renal function is still slightly elevated patient feels symptoms other than the patient will be discharged home      MDM   Discharge            Disposition and Plan     Clinical Impression:  Hyperglycemia  (primary encounter diagnosis)    Dispo

## 2018-09-13 VITALS
TEMPERATURE: 98 F | HEIGHT: 61 IN | DIASTOLIC BLOOD PRESSURE: 69 MMHG | RESPIRATION RATE: 18 BRPM | HEART RATE: 74 BPM | SYSTOLIC BLOOD PRESSURE: 111 MMHG | BODY MASS INDEX: 30.78 KG/M2 | WEIGHT: 163 LBS | OXYGEN SATURATION: 90 %

## 2018-09-13 LAB
ANION GAP SERPL CALC-SCNC: 5 MMOL/L (ref 0–18)
BUN BLD-MCNC: 23 MG/DL (ref 8–20)
BUN/CREAT SERPL: 20.5 (ref 10–20)
CALCIUM BLD-MCNC: 7.7 MG/DL (ref 8.3–10.3)
CHLORIDE SERPL-SCNC: 110 MMOL/L (ref 101–111)
CO2 SERPL-SCNC: 21 MMOL/L (ref 22–32)
CREAT BLD-MCNC: 1.12 MG/DL (ref 0.55–1.02)
ERYTHROCYTE [DISTWIDTH] IN BLOOD BY AUTOMATED COUNT: 13.8 % (ref 11.5–16)
EST. AVERAGE GLUCOSE BLD GHB EST-MCNC: 226 MG/DL (ref 68–126)
GLUCOSE BLD-MCNC: 180 MG/DL (ref 65–99)
GLUCOSE BLD-MCNC: 201 MG/DL (ref 70–99)
GLUCOSE BLD-MCNC: 207 MG/DL (ref 65–99)
GLUCOSE BLD-MCNC: 294 MG/DL (ref 65–99)
HBA1C MFR BLD HPLC: 9.5 % (ref ?–5.7)
HCT VFR BLD AUTO: 32.9 % (ref 34–50)
HGB BLD-MCNC: 10.6 G/DL (ref 12–16)
MCH RBC QN AUTO: 28.9 PG (ref 27–33.2)
MCHC RBC AUTO-ENTMCNC: 32.2 G/DL (ref 31–37)
MCV RBC AUTO: 89.6 FL (ref 81–100)
OSMOLALITY SERPL CALC.SUM OF ELEC: 291 MOSM/KG (ref 275–295)
PLATELET # BLD AUTO: 176 10(3)UL (ref 150–450)
POTASSIUM SERPL-SCNC: 5.8 MMOL/L (ref 3.6–5.1)
RBC # BLD AUTO: 3.67 X10(6)UL (ref 3.8–5.1)
RED CELL DISTRIBUTION WIDTH-SD: 45.2 FL (ref 35.1–46.3)
SODIUM SERPL-SCNC: 136 MMOL/L (ref 136–144)
WBC # BLD AUTO: 5.4 X10(3) UL (ref 4–13)

## 2018-09-13 PROCEDURE — 99217 OBSERVATION CARE DISCHARGE: CPT | Performed by: INTERNAL MEDICINE

## 2018-09-13 RX ORDER — SERTRALINE HYDROCHLORIDE 100 MG/1
100 TABLET, FILM COATED ORAL DAILY
Status: DISCONTINUED | OUTPATIENT
Start: 2018-09-13 | End: 2018-09-13

## 2018-09-13 RX ORDER — OLANZAPINE 2.5 MG/1
7.5 TABLET ORAL NIGHTLY
Status: DISCONTINUED | OUTPATIENT
Start: 2018-09-13 | End: 2018-09-13 | Stop reason: SDUPTHER

## 2018-09-13 RX ORDER — ONDANSETRON 2 MG/ML
4 INJECTION INTRAMUSCULAR; INTRAVENOUS EVERY 6 HOURS PRN
Status: DISCONTINUED | OUTPATIENT
Start: 2018-09-13 | End: 2018-09-13

## 2018-09-13 RX ORDER — HEPARIN SODIUM 5000 [USP'U]/ML
5000 INJECTION, SOLUTION INTRAVENOUS; SUBCUTANEOUS EVERY 8 HOURS SCHEDULED
Status: DISCONTINUED | OUTPATIENT
Start: 2018-09-13 | End: 2018-09-13

## 2018-09-13 RX ORDER — HYDRALAZINE HYDROCHLORIDE 50 MG/1
100 TABLET, FILM COATED ORAL 2 TIMES DAILY
Status: DISCONTINUED | OUTPATIENT
Start: 2018-09-13 | End: 2018-09-13

## 2018-09-13 RX ORDER — METOPROLOL SUCCINATE 25 MG/1
25 TABLET, EXTENDED RELEASE ORAL
Status: DISCONTINUED | OUTPATIENT
Start: 2018-09-13 | End: 2018-09-13

## 2018-09-13 RX ORDER — OLANZAPINE 2.5 MG/1
10 TABLET ORAL NIGHTLY
Status: DISCONTINUED | OUTPATIENT
Start: 2018-09-13 | End: 2018-09-13 | Stop reason: SDUPTHER

## 2018-09-13 RX ORDER — METOCLOPRAMIDE HYDROCHLORIDE 5 MG/ML
5 INJECTION INTRAMUSCULAR; INTRAVENOUS EVERY 8 HOURS PRN
Status: DISCONTINUED | OUTPATIENT
Start: 2018-09-13 | End: 2018-09-13

## 2018-09-13 RX ORDER — SODIUM CHLORIDE 1000 MG
1 TABLET, SOLUBLE MISCELLANEOUS
Status: DISCONTINUED | OUTPATIENT
Start: 2018-09-13 | End: 2018-09-13

## 2018-09-13 RX ORDER — MELATONIN
325 DAILY
Status: DISCONTINUED | OUTPATIENT
Start: 2018-09-13 | End: 2018-09-13

## 2018-09-13 RX ORDER — LEVOTHYROXINE SODIUM 0.1 MG/1
100 TABLET ORAL
Status: DISCONTINUED | OUTPATIENT
Start: 2018-09-13 | End: 2018-09-13

## 2018-09-13 RX ORDER — SODIUM CHLORIDE 9 MG/ML
INJECTION, SOLUTION INTRAVENOUS CONTINUOUS
Status: DISCONTINUED | OUTPATIENT
Start: 2018-09-13 | End: 2018-09-13

## 2018-09-13 RX ORDER — ERGOCALCIFEROL (VITAMIN D2) 1250 MCG
CAPSULE ORAL WEEKLY
COMMUNITY
Start: 2018-08-09 | End: 2019-06-20 | Stop reason: ALTCHOICE

## 2018-09-13 RX ORDER — ATORVASTATIN CALCIUM 10 MG/1
10 TABLET, FILM COATED ORAL NIGHTLY
Status: DISCONTINUED | OUTPATIENT
Start: 2018-09-13 | End: 2018-09-13

## 2018-09-13 RX ORDER — ACETAMINOPHEN 325 MG/1
650 TABLET ORAL EVERY 6 HOURS PRN
Status: DISCONTINUED | OUTPATIENT
Start: 2018-09-13 | End: 2018-09-13

## 2018-09-13 RX ORDER — DEXTROSE MONOHYDRATE 25 G/50ML
50 INJECTION, SOLUTION INTRAVENOUS
Status: DISCONTINUED | OUTPATIENT
Start: 2018-09-13 | End: 2018-09-13

## 2018-09-13 RX ORDER — PREGABALIN 75 MG/1
75 CAPSULE ORAL 2 TIMES DAILY
Status: DISCONTINUED | OUTPATIENT
Start: 2018-09-13 | End: 2018-09-13

## 2018-09-13 NOTE — PAYOR COMM NOTE
--------------  ADMISSION REVIEW     Payor: [address] #:  [id number]  Authorization Number: N/A    Admit date: N/A  Admit time: N/A       Admitting Physician: Eva Emmanuel MD  Attending Physician:  Trent Graves MD  Primary Ca hyperlipidemia    • Personal history of antineoplastic chemotherapy     OVER 20 YRS AGO   • Personal history of irradiation, presenting hazards to health    • Personal history of pneumonia (recurrent)    • Pneumonia, organism unspecified(486)    • Psychiat with no focal deficits       ED Course     Labs Reviewed   COMP METABOLIC PANEL (14) - Abnormal; Notable for the following components:       Result Value    Glucose 221 (*)     Sodium 127 (*)     Potassium 5.9 (*)     Chloride 100 (*)     CO2 21.0 (*) REFLEX   RAINBOW DRAW BLUE   RAINBOW DRAW LAVENDER   RAINBOW DRAW LIGHT GREEN   RAINBOW DRAW GOLD     EKG    Rate, intervals and axes as noted on EKG Report.   Rate: 74  Rhythm: Sinus Rhythm  Reading: No areas of acute ST segment elevation or depression observation. I discussed patient Dr. Alexandr Meraz and she was admitted in stable condition.     Signed by Deidre Ziegler MD on 9/13/2018 12:28 AM            H&P - H&P Note      H&P signed by Cuco Celestin MD at 9/12/2018 11:45 PM     Author:  Ahmet Blandon chemotherapy    • Encounter for long-term (current) use of other medications    • Esophageal reflux    • Exposure to radiation    • High blood pressure    • High cholesterol    • Hypothyroidism    • Low sodium levels 10/2014   • Obesity, unspecified    • O Comment:Unsure if true allergy at all due to dementia/ but             pt is pre-medicated with prednisone (per daughter)  Penicillins             UNKNOWN    Medications:    No current facility-administered medications on file prior to encounter.    Current 100 mg by mouth daily. Disp:  Rfl:    MetFORMIN HCl (GLUCOPHAGE) 1000 MG Oral Tab Take 1,000 mg by mouth 2 (two) times daily with meals. Disp:  Rfl:    sodium chloride 1 G Oral Tab Take 1 g by mouth 3 (three) times daily with meals.  Indications: Electrolyt ALB  3.3*   --    --    NA  127*  125*   --    K  5.9*   --   4.9   CL  100*  102   --    CO2  21.0*  21.0*   --    ALKPHO  83   --    --    AST  32   --    --    ALT  29   --    --    BILT  0.3   --    --    TP  7.4   --    --        Estimated Creatinin Basilia INIGUEZ RN      insulin aspart (NOVOLOG) 100 UNIT/ML vial 8 Units     Date Action Dose Route User    9/12/2018 1601 Given 8 Units Subcutaneous (Right Lower Abdomen) Patrice Macedo RN      Insulin Aspart Pen (NOVOLOG) 100 UNIT/ML flexpen 2-10 Uni User    9/13/2018 1238 Given 1 g Oral Tacho Arias RN    9/13/2018 0923 Given 1 g Oral Tacho Arias RN      OLANZapine [facility]) tab 17.5 mg     Date Action Dose Route User    9/13/2018 0148 Given 17.5 mg Oral Lori Rubio RN

## 2018-09-13 NOTE — CONSULTS
[facility]  Report of Consultation    Erum Turcios Patient Status:  Observation    1940 MRN [id number]   [facility] 4NW-A Attending Kavitha Cabral MD   Hosp Day # 0 PCP Kaitlin Jara MD     Reason for Consultation:  Sonya Clark Personal history of antineoplastic chemotherapy     OVER 21 YRS AGO   • Personal history of irradiation, presenting hazards to health    • Personal history of pneumonia (recurrent)    • Pneumonia, organism unspecified(486)    • Psychiatric disorder    • Sc UNIT/ML flextouch 13 Units, 13 Units, Subcutaneous, Nightly  •  insulin detemir (LEVEMIR) 100 UNIT/ML flextouch 64 Units, 64 Units, Subcutaneous, Daily  •  Levothyroxine Sodium (SYNTHROID) tab 100 mcg, 100 mcg, Oral, Before breakfast  •  Metoprolol [facility] sclera, perrla, eomi  ENT: op clear, no lesions noted, MMM  Neck: supple, no thyromegaly or nodules palpable  Lymph: no neck or supraclavicular lymphadenopathy  Cardiovascular:  RRR; no murmurs   Lungs: CTAB, no wheezes or rales  Abd: soft, nontender, nond Dementia in conditions classified elsewhere with behavioral disturbance     Bipolar I disorder, most recent episode (or current) depressed, severe, specified as with psychotic behavior ([facility] [address]     Severe bipolar I disorder, most recent episode mixed, with ps returning to NH today with same insulin doses that have been working here and daughter will talk to [facility] staff about insulin administration, patient's dietary habits, nursing supervision.   She says that her \"regular nurse\" has been off for 4 days which coi

## 2018-09-13 NOTE — ED NOTES
Pt daughter called w/ concerns regarding the care plan (i.e. Discharge) for her Mother Fiona Dewey. Contacted Dr. Kim Ferrer, who in turn spoke w/ Dr. Kyara Zavala regarding PT care.   Dr. Kyara Zavala then spoke w/ Pt's daughter on the phone and advised will discuss ca

## 2018-09-13 NOTE — ED NOTES
[facility] ambulance canceled for pt transport, family will transport pt home.

## 2018-09-13 NOTE — ED NOTES
Emilie RN at [address] [place] [address] made aware of pt's admission to Rm#[phone], given phone number to nurses station.

## 2018-09-13 NOTE — ED PROVIDER NOTES
Change of shift, patient was awaiting her daughter take her home to the nursing home, the daughter states that she is uncomfortable doing so. Patient apparently has had significantly elevated glucose in the morning the last 3 or 4 days.   Today they gave h

## 2018-09-13 NOTE — PLAN OF CARE
Patient was admitted to room 433 with reports of elevated BS. Last BS in ER was 155. Patient is poor historian for details. Med rec completed with med list from [facility]  and medical record. No family present.  Requested a snack as stated did  not have

## 2018-09-13 NOTE — ED NOTES
Due to Pt's oxygen level dropping, placed Pt on nasal canula 3 Lit. PT oxygen increased to 93%. Pt vital signs stable, Pt A&Ox4, Pt NSR on cardiac monitor.  Pt updated to care plan

## 2018-09-13 NOTE — H&P
TR HOSPITALIST  History and Physical     Brian Live Patient Status:  Emergency    1940 MRN [id number]   Location [address] Attending No att. providers found   [facility] Day # 0 PCP Carmencita Strauss MD     Chief Complai • [facility]    • Type 2 diabetes mellitus (HCC)    • Type II or unspecified type diabetes mellitus without mention of complication, not stated as uncontrolled    • Unspecified disorder of thyroid    • Unspecified essential hypertension    • Uns 17.5 mg/day Disp:  Rfl:    Cholecalciferol (VITAMIN D3) 04500 units Oral Tab Take 1 tablet by mouth once a week. Disp:  Rfl:    ondansetron 4 MG Oral Tablet Dispersible Take 4 mg by mouth every 6 (six) hours as needed for Nausea.  Disp:  Rfl:    Levothyroxi Review of Systems:   A comprehensive 14 point review of systems was completed. Pertinent positives and negatives noted in the HPI.     Physical Exam:    /53   Pulse 65   Temp 98.2 °F (36.8 °C) (Oral)   Resp 14   Ht 154.9 cm (5' 1\")   Wt 163 DM2  1. Continue home insulin and monitor overnight  2. ? If insulin at facility defective as sugars improved here  3. SSI  4. Check A1c  5. DM education  6. Consider Endocrine eval if labile sugars in AM  2. Essential HTN  1. Continue home meds  3.  Dyslip

## 2018-09-13 NOTE — CM/SW NOTE
09/13/18 1400   Discharge disposition   Expected discharge disposition Skilled Nurs   Additional Home Care/Hospice Provider ([address] [place] [address])   Discharge transportation Other (comment)  ([address])     CM called [facility] to set up a

## 2018-09-13 NOTE — ED NOTES
Pt advised she is not sure if her daughter is coming, and agreed to have medi-car transport her back to the nursing home. Therefore, called [facility] for safe transport back to [facility] [place].

## 2018-09-13 NOTE — DISCHARGE SUMMARY
[facility]  Discharge Summary    Luis Miguel Antonio Patient Status:  Observation    1940 MRN [id number]   [facility] 4NW-A Attending Bret Briggs MD   Hosp Day # 0 PCP Ray Rios MD     Date of Admission: 2018    Date of D creatinine of 1.72 with elevated blood sugar of 221. Patient given IV fluids with sodium came back to normal today to 136 and creatinine improved from 1.72 on admission to 1.12 today. Potassium initially improved to 4.9.   Patient not on any medications t units; -250= 4 units; -300=6 units;  -350= 8 units; -400=10 units; please call MD for BG <70 and >400   Refills:  0     HydrALAZINE HCl 100 MG Tabs  Commonly known as:  APRESOLINE      Take 100 mg by mouth 2 (two) times daily.    Ref mouth 3 (three) times daily with meals. Indications: Electrolyte Disturbance   Refills:  0     Vitamin D3 04659 units Tabs      Take 1 tablet by mouth once a week. Refills:  0                 Follow up Visits:  Follow-up with Tamiko Yancey MD in 1 week

## 2018-09-13 NOTE — RESPIRATORY THERAPY NOTE
Patient refused to wear cpap. States she does not wear one at home and has never had a sleep study. She is on 2-3L O2, satting 97%. She understands that if she desats and has periods of apnea then she will have to wear a cpap. Will continue to monitor.

## 2018-09-14 NOTE — PLAN OF CARE
Pt discharged to [facility] report called to nurse. Daughter is aware blood sugar stable.  Paperwork given to Davey Grimes

## 2018-09-20 ENCOUNTER — HOSPITAL ENCOUNTER (OUTPATIENT)
Dept: POSTOP/PACU | Facility: HOSPITAL | Age: 78
Discharge: HOME | End: 2018-09-20
Attending: Other
Payer: MEDICARE

## 2018-09-20 VITALS
DIASTOLIC BLOOD PRESSURE: 76 MMHG | HEIGHT: 62 IN | OXYGEN SATURATION: 92 % | SYSTOLIC BLOOD PRESSURE: 158 MMHG | BODY MASS INDEX: 30 KG/M2 | HEART RATE: 83 BPM | RESPIRATION RATE: 15 BRPM

## 2018-09-20 DIAGNOSIS — F31.2 SEVERE MANIC BIPOLAR I DISORDER WITH PSYCHOTIC FEATURES (HCC): ICD-10-CM

## 2018-09-20 DIAGNOSIS — F31.64 SEVERE BIPOLAR I DISORDER, MOST RECENT EPISODE MIXED, WITH PSYCHOTIC FEATURES (HCC): ICD-10-CM

## 2018-09-20 LAB
GLUCOSE BLD-MCNC: 101 MG/DL (ref 65–99)
GLUCOSE BLD-MCNC: 96 MG/DL (ref 65–99)

## 2018-09-20 RX ORDER — HYDRALAZINE HYDROCHLORIDE 20 MG/ML
5 INJECTION INTRAMUSCULAR; INTRAVENOUS
Status: DISCONTINUED | OUTPATIENT
Start: 2018-09-20 | End: 2018-09-22

## 2018-09-20 RX ORDER — ACETAMINOPHEN 500 MG
1000 TABLET ORAL ONCE
Status: DISCONTINUED | OUTPATIENT
Start: 2018-09-20 | End: 2018-09-22

## 2018-09-20 RX ORDER — GLYCOPYRROLATE 0.2 MG/ML
INJECTION, SOLUTION INTRAMUSCULAR; INTRAVENOUS
Status: COMPLETED
Start: 2018-09-20 | End: 2018-09-20

## 2018-09-20 RX ORDER — DEXTROSE MONOHYDRATE 25 G/50ML
50 INJECTION, SOLUTION INTRAVENOUS
Status: DISCONTINUED | OUTPATIENT
Start: 2018-09-20 | End: 2018-09-20

## 2018-09-20 RX ORDER — NALOXONE HYDROCHLORIDE 0.4 MG/ML
80 INJECTION, SOLUTION INTRAMUSCULAR; INTRAVENOUS; SUBCUTANEOUS AS NEEDED
Status: ACTIVE | OUTPATIENT
Start: 2018-09-20 | End: 2018-09-20

## 2018-09-20 RX ORDER — DEXTROSE MONOHYDRATE 25 G/50ML
50 INJECTION, SOLUTION INTRAVENOUS
Status: DISCONTINUED | OUTPATIENT
Start: 2018-09-20 | End: 2018-09-22

## 2018-09-20 RX ORDER — GLYCOPYRROLATE 0.2 MG/ML
0.2 INJECTION, SOLUTION INTRAMUSCULAR; INTRAVENOUS ONCE
Status: DISCONTINUED | OUTPATIENT
Start: 2018-09-20 | End: 2018-09-20

## 2018-09-20 RX ORDER — SODIUM CHLORIDE, SODIUM LACTATE, POTASSIUM CHLORIDE, CALCIUM CHLORIDE 600; 310; 30; 20 MG/100ML; MG/100ML; MG/100ML; MG/100ML
INJECTION, SOLUTION INTRAVENOUS CONTINUOUS
Status: DISCONTINUED | OUTPATIENT
Start: 2018-09-20 | End: 2018-09-22

## 2018-09-20 RX ORDER — HYDRALAZINE HYDROCHLORIDE 20 MG/ML
INJECTION INTRAMUSCULAR; INTRAVENOUS
Status: COMPLETED
Start: 2018-09-20 | End: 2018-09-20

## 2018-09-20 RX ORDER — ONDANSETRON 2 MG/ML
4 INJECTION INTRAMUSCULAR; INTRAVENOUS AS NEEDED
Status: ACTIVE | OUTPATIENT
Start: 2018-09-20 | End: 2018-09-20

## 2018-09-20 RX ORDER — GLYCOPYRROLATE 0.2 MG/ML
0.2 INJECTION, SOLUTION INTRAMUSCULAR; INTRAVENOUS ONCE
Status: DISCONTINUED | OUTPATIENT
Start: 2018-09-20 | End: 2018-09-22

## 2018-09-20 RX ADMIN — GLYCOPYRROLATE 0.2 MG: 0.2 INJECTION, SOLUTION INTRAMUSCULAR; INTRAVENOUS at 06:43:00

## 2018-09-20 RX ADMIN — HYDRALAZINE HYDROCHLORIDE 5 MG: 20 INJECTION INTRAMUSCULAR; INTRAVENOUS at 08:38:00

## 2018-09-20 RX ADMIN — HYDRALAZINE HYDROCHLORIDE 5 MG: 20 INJECTION INTRAMUSCULAR; INTRAVENOUS at 08:58:00

## 2018-09-20 NOTE — PROGRESS NOTES
[facility] / [facility]  ECT History & Physical    Rosiland Spurling Patient Status:  Outpatient   Age/Gender 69year old female MRN [id number]   Location [address] [facility] Attending Jeffery Stacy MD   Hosp Day # 0 PCP Jessica Gastelum thyroid  No date: Unspecified essential hypertension  No date: Unspecified vascular insufficiency of intestine    Surgical History:  Past Surgical History:  04/2006: CATARACT; Left      Comment:  ? IOL  3/31/2017: CATHETER INSERTION PORT-A-CATH;  Left Pulses:   2+ and symmetric all extremities   Skin:   Skin color, texture, turgor normal, no rashes or lesions   Neurologic:   CNII-XII intact, normal strength, sensation and reflexes     throughout     Impressions & Plans: Bipolar manic severe with psych

## 2018-09-20 NOTE — PROGRESS NOTES
[facility] / [facility]  ECT Procedure Note    Dakotah Hind Patient Status:  Outpatient   Age/Gender 69year old female MRN [id number]   Location [address] [facility] Attending Bernarda Hdz MD   Hosp Day # 0 PCP Ta

## 2018-10-10 ENCOUNTER — HOSPITAL ENCOUNTER (OUTPATIENT)
Dept: POSTOP/PACU | Facility: HOSPITAL | Age: 78
Discharge: HOME | End: 2018-10-10
Attending: Other
Payer: MEDICARE

## 2018-10-10 VITALS
HEART RATE: 94 BPM | BODY MASS INDEX: 30 KG/M2 | SYSTOLIC BLOOD PRESSURE: 153 MMHG | DIASTOLIC BLOOD PRESSURE: 99 MMHG | OXYGEN SATURATION: 92 % | HEIGHT: 62 IN | RESPIRATION RATE: 17 BRPM

## 2018-10-10 DIAGNOSIS — F31.64 SEVERE BIPOLAR I DISORDER, MOST RECENT EPISODE MIXED, WITH PSYCHOTIC FEATURES (HCC): ICD-10-CM

## 2018-10-10 DIAGNOSIS — F31.2 SEVERE MANIC BIPOLAR I DISORDER WITH PSYCHOTIC FEATURES (HCC): ICD-10-CM

## 2018-10-10 RX ORDER — GLYCOPYRROLATE 0.2 MG/ML
INJECTION, SOLUTION INTRAMUSCULAR; INTRAVENOUS
Status: COMPLETED
Start: 2018-10-10 | End: 2018-10-10

## 2018-10-10 RX ORDER — MORPHINE SULFATE 4 MG/ML
2 INJECTION, SOLUTION INTRAMUSCULAR; INTRAVENOUS EVERY 5 MIN PRN
Status: ACTIVE | OUTPATIENT
Start: 2018-10-10 | End: 2018-10-10

## 2018-10-10 RX ORDER — ONDANSETRON 2 MG/ML
4 INJECTION INTRAMUSCULAR; INTRAVENOUS AS NEEDED
Status: ACTIVE | OUTPATIENT
Start: 2018-10-10 | End: 2018-10-10

## 2018-10-10 RX ORDER — SODIUM CHLORIDE, SODIUM LACTATE, POTASSIUM CHLORIDE, CALCIUM CHLORIDE 600; 310; 30; 20 MG/100ML; MG/100ML; MG/100ML; MG/100ML
INJECTION, SOLUTION INTRAVENOUS CONTINUOUS
Status: DISCONTINUED | OUTPATIENT
Start: 2018-10-10 | End: 2018-10-12

## 2018-10-10 RX ORDER — DEXTROSE MONOHYDRATE 25 G/50ML
50 INJECTION, SOLUTION INTRAVENOUS
Status: DISCONTINUED | OUTPATIENT
Start: 2018-10-10 | End: 2018-10-12

## 2018-10-10 RX ORDER — GLYCOPYRROLATE 0.2 MG/ML
0.2 INJECTION, SOLUTION INTRAMUSCULAR; INTRAVENOUS ONCE
Status: COMPLETED | OUTPATIENT
Start: 2018-10-10 | End: 2018-10-10

## 2018-10-10 RX ORDER — HYDROCODONE BITARTRATE AND ACETAMINOPHEN 10; 325 MG/1; MG/1
2 TABLET ORAL AS NEEDED
Status: DISCONTINUED | OUTPATIENT
Start: 2018-10-10 | End: 2018-10-12

## 2018-10-10 RX ORDER — HYDROCODONE BITARTRATE AND ACETAMINOPHEN 10; 325 MG/1; MG/1
1 TABLET ORAL AS NEEDED
Status: DISCONTINUED | OUTPATIENT
Start: 2018-10-10 | End: 2018-10-12

## 2018-10-10 RX ORDER — NALOXONE HYDROCHLORIDE 0.4 MG/ML
80 INJECTION, SOLUTION INTRAMUSCULAR; INTRAVENOUS; SUBCUTANEOUS AS NEEDED
Status: ACTIVE | OUTPATIENT
Start: 2018-10-10 | End: 2018-10-10

## 2018-10-10 RX ORDER — METOCLOPRAMIDE HYDROCHLORIDE 5 MG/ML
10 INJECTION INTRAMUSCULAR; INTRAVENOUS AS NEEDED
Status: ACTIVE | OUTPATIENT
Start: 2018-10-10 | End: 2018-10-10

## 2018-10-10 RX ADMIN — SODIUM CHLORIDE, SODIUM LACTATE, POTASSIUM CHLORIDE, CALCIUM CHLORIDE: 600; 310; 30; 20 INJECTION, SOLUTION INTRAVENOUS at 06:42:00

## 2018-10-10 RX ADMIN — GLYCOPYRROLATE 0.2 MG: 0.2 INJECTION, SOLUTION INTRAMUSCULAR; INTRAVENOUS at 06:43:00

## 2018-10-10 NOTE — PROGRESS NOTES
[facility] / [facility]  ECT History & Physical    Pablito Dillard Patient Status:  Outpatient   Age/Gender 69year old female MRN [id number]   Location [facility] Attending Pino Francis MD   Hosp Day # 0 PCP Kay Crespo N/A 3/3/2017    Performed by Stefanie Barcenas MD at [facility] MAIN OR   • CHEMOTHERAPY Left 1998    left breast cancer with mastectomy, chemo and radiation.    • COLONOSCOPY      tublar adenoma   • ECT PROVIDED Bilateral 11/2014    multiple ECT's every 3 weeks or so They understand and agree to proceed with plan of care.     George Montelongo

## 2018-10-10 NOTE — PROGRESS NOTES
[facility] / [facility]  ECT Procedure Note    Dakotah Hind Patient Status:  Outpatient   Age/Gender 69year old female MRN [id number]   Location 13[address] [facility] Attending Bernarda Hdz MD   Hosp Day # 0 PCP Ta

## 2018-10-26 PROBLEM — Z79.4 TYPE 2 DIABETES MELLITUS WITH HYPOGLYCEMIA WITHOUT COMA, WITH LONG-TERM CURRENT USE OF INSULIN (HCC): Status: ACTIVE | Noted: 2018-10-26

## 2018-10-26 PROBLEM — E11.65 UNCONTROLLED TYPE 2 DIABETES MELLITUS WITH HYPERGLYCEMIA, WITH LONG-TERM CURRENT USE OF INSULIN (HCC): Status: ACTIVE | Noted: 2018-10-26

## 2018-10-26 PROBLEM — E78.2 MIXED HYPERLIPIDEMIA: Status: ACTIVE | Noted: 2018-10-26

## 2018-10-26 PROBLEM — Z79.4 LONG-TERM INSULIN USE (HCC): Status: ACTIVE | Noted: 2018-10-26

## 2018-10-26 PROBLEM — E11.649 TYPE 2 DIABETES MELLITUS WITH HYPOGLYCEMIA WITHOUT COMA, WITH LONG-TERM CURRENT USE OF INSULIN (HCC): Status: ACTIVE | Noted: 2018-10-26

## 2018-10-26 PROBLEM — Z79.4 UNCONTROLLED TYPE 2 DIABETES MELLITUS WITH HYPERGLYCEMIA, WITH LONG-TERM CURRENT USE OF INSULIN (HCC): Status: ACTIVE | Noted: 2018-10-26

## 2018-10-31 ENCOUNTER — HOSPITAL ENCOUNTER (OUTPATIENT)
Dept: POSTOP/PACU | Facility: HOSPITAL | Age: 78
Discharge: HOME | End: 2018-10-31
Attending: Other
Payer: MEDICARE

## 2018-10-31 VITALS
HEIGHT: 62 IN | HEART RATE: 78 BPM | BODY MASS INDEX: 30 KG/M2 | SYSTOLIC BLOOD PRESSURE: 169 MMHG | DIASTOLIC BLOOD PRESSURE: 82 MMHG | OXYGEN SATURATION: 89 % | RESPIRATION RATE: 15 BRPM

## 2018-10-31 DIAGNOSIS — F31.2 SEVERE MANIC BIPOLAR I DISORDER WITH PSYCHOTIC FEATURES (HCC): ICD-10-CM

## 2018-10-31 DIAGNOSIS — F31.64 SEVERE BIPOLAR I DISORDER, MOST RECENT EPISODE MIXED, WITH PSYCHOTIC FEATURES (HCC): ICD-10-CM

## 2018-10-31 RX ORDER — DEXTROSE MONOHYDRATE 25 G/50ML
50 INJECTION, SOLUTION INTRAVENOUS
Status: DISCONTINUED | OUTPATIENT
Start: 2018-10-31 | End: 2018-11-02

## 2018-10-31 RX ORDER — SODIUM CHLORIDE, SODIUM LACTATE, POTASSIUM CHLORIDE, CALCIUM CHLORIDE 600; 310; 30; 20 MG/100ML; MG/100ML; MG/100ML; MG/100ML
INJECTION, SOLUTION INTRAVENOUS CONTINUOUS
Status: DISCONTINUED | OUTPATIENT
Start: 2018-10-31 | End: 2018-11-02

## 2018-10-31 RX ORDER — NALOXONE HYDROCHLORIDE 0.4 MG/ML
80 INJECTION, SOLUTION INTRAMUSCULAR; INTRAVENOUS; SUBCUTANEOUS AS NEEDED
Status: ACTIVE | OUTPATIENT
Start: 2018-10-31 | End: 2018-10-31

## 2018-10-31 RX ORDER — METOCLOPRAMIDE HYDROCHLORIDE 5 MG/ML
10 INJECTION INTRAMUSCULAR; INTRAVENOUS AS NEEDED
Status: ACTIVE | OUTPATIENT
Start: 2018-10-31 | End: 2018-10-31

## 2018-10-31 RX ORDER — MIDAZOLAM HYDROCHLORIDE 1 MG/ML
1 INJECTION INTRAMUSCULAR; INTRAVENOUS EVERY 5 MIN PRN
Status: ACTIVE | OUTPATIENT
Start: 2018-10-31 | End: 2018-10-31

## 2018-10-31 RX ORDER — ONDANSETRON 2 MG/ML
4 INJECTION INTRAMUSCULAR; INTRAVENOUS AS NEEDED
Status: ACTIVE | OUTPATIENT
Start: 2018-10-31 | End: 2018-10-31

## 2018-10-31 RX ORDER — GLYCOPYRROLATE 0.2 MG/ML
0.2 INJECTION, SOLUTION INTRAMUSCULAR; INTRAVENOUS ONCE
Status: COMPLETED | OUTPATIENT
Start: 2018-10-31 | End: 2018-10-31

## 2018-10-31 RX ORDER — DEXAMETHASONE SODIUM PHOSPHATE 4 MG/ML
8 VIAL (ML) INJECTION AS NEEDED
Status: ACTIVE | OUTPATIENT
Start: 2018-10-31 | End: 2018-10-31

## 2018-10-31 RX ORDER — HYDROCODONE BITARTRATE AND ACETAMINOPHEN 5; 325 MG/1; MG/1
1 TABLET ORAL AS NEEDED
Status: DISCONTINUED | OUTPATIENT
Start: 2018-10-31 | End: 2018-11-02

## 2018-10-31 RX ORDER — GLYCOPYRROLATE 0.2 MG/ML
INJECTION, SOLUTION INTRAMUSCULAR; INTRAVENOUS
Status: COMPLETED
Start: 2018-10-31 | End: 2018-10-31

## 2018-10-31 RX ORDER — HYDROCODONE BITARTRATE AND ACETAMINOPHEN 5; 325 MG/1; MG/1
2 TABLET ORAL AS NEEDED
Status: DISCONTINUED | OUTPATIENT
Start: 2018-10-31 | End: 2018-11-02

## 2018-10-31 RX ORDER — MORPHINE SULFATE 4 MG/ML
2 INJECTION, SOLUTION INTRAMUSCULAR; INTRAVENOUS EVERY 5 MIN PRN
Status: ACTIVE | OUTPATIENT
Start: 2018-10-31 | End: 2018-10-31

## 2018-10-31 RX ORDER — MEPERIDINE HYDROCHLORIDE 25 MG/ML
12.5 INJECTION INTRAMUSCULAR; INTRAVENOUS; SUBCUTANEOUS AS NEEDED
Status: DISCONTINUED | OUTPATIENT
Start: 2018-10-31 | End: 2018-11-02

## 2018-10-31 RX ADMIN — GLYCOPYRROLATE 0.2 MG: 0.2 INJECTION, SOLUTION INTRAMUSCULAR; INTRAVENOUS at 06:52:00

## 2018-10-31 RX ADMIN — SODIUM CHLORIDE, SODIUM LACTATE, POTASSIUM CHLORIDE, CALCIUM CHLORIDE: 600; 310; 30; 20 INJECTION, SOLUTION INTRAVENOUS at 07:00:00

## 2018-10-31 NOTE — PROGRESS NOTES
[facility] / [facility]  ECT Procedure Note    Katarzyna Party Patient Status:  Outpatient   Age/Gender 69year old female MRN [id number]   Location [address] [facility] Attending Leah Felton MD   Hosp Day # 0 PCP Ta

## 2018-10-31 NOTE — PROGRESS NOTES
[facility] / [facility]  ECT History & Physical    Rosiland Spurling Patient Status:  Outpatient   Age/Gender 69year old female MRN [id number]   Location [address] [facility] Attending Jeffery Stacy MD   Hosp Day # 0 PCP Jessica Gastelum N/A 3/3/2017    Performed by Maida Adams MD at [facility] MAIN OR   • CHEMOTHERAPY Left 1998    left breast cancer with mastectomy, chemo and radiation.    • COLONOSCOPY      tublar adenoma   • ECT PROVIDED Bilateral 11/2014    multiple ECT's every 3 weeks or so with the patient/family. They understand and agree to proceed with plan of care.     Ban Motley

## 2018-11-21 ENCOUNTER — HOSPITAL ENCOUNTER (OUTPATIENT)
Dept: POSTOP/PACU | Facility: HOSPITAL | Age: 78
Discharge: HOME | End: 2018-11-21
Attending: Other
Payer: MEDICARE

## 2018-11-21 VITALS
HEIGHT: 62 IN | OXYGEN SATURATION: 96 % | DIASTOLIC BLOOD PRESSURE: 87 MMHG | RESPIRATION RATE: 23 BRPM | HEART RATE: 80 BPM | BODY MASS INDEX: 30 KG/M2 | SYSTOLIC BLOOD PRESSURE: 139 MMHG

## 2018-11-21 DIAGNOSIS — F31.64 SEVERE BIPOLAR I DISORDER, MOST RECENT EPISODE MIXED, WITH PSYCHOTIC FEATURES (HCC): ICD-10-CM

## 2018-11-21 DIAGNOSIS — F31.2 SEVERE MANIC BIPOLAR I DISORDER WITH PSYCHOTIC FEATURES (HCC): ICD-10-CM

## 2018-11-21 RX ORDER — NALOXONE HYDROCHLORIDE 0.4 MG/ML
80 INJECTION, SOLUTION INTRAMUSCULAR; INTRAVENOUS; SUBCUTANEOUS AS NEEDED
Status: ACTIVE | OUTPATIENT
Start: 2018-11-21 | End: 2018-11-21

## 2018-11-21 RX ORDER — HYDROCODONE BITARTRATE AND ACETAMINOPHEN 5; 325 MG/1; MG/1
1 TABLET ORAL AS NEEDED
Status: DISCONTINUED | OUTPATIENT
Start: 2018-11-21 | End: 2018-11-23

## 2018-11-21 RX ORDER — DEXTROSE MONOHYDRATE 25 G/50ML
50 INJECTION, SOLUTION INTRAVENOUS
Status: DISCONTINUED | OUTPATIENT
Start: 2018-11-21 | End: 2018-11-23

## 2018-11-21 RX ORDER — GLYCOPYRROLATE 0.2 MG/ML
INJECTION, SOLUTION INTRAMUSCULAR; INTRAVENOUS
Status: COMPLETED
Start: 2018-11-21 | End: 2018-11-21

## 2018-11-21 RX ORDER — ONDANSETRON 2 MG/ML
4 INJECTION INTRAMUSCULAR; INTRAVENOUS AS NEEDED
Status: ACTIVE | OUTPATIENT
Start: 2018-11-21 | End: 2018-11-21

## 2018-11-21 RX ORDER — MIDAZOLAM HYDROCHLORIDE 1 MG/ML
1 INJECTION INTRAMUSCULAR; INTRAVENOUS EVERY 5 MIN PRN
Status: ACTIVE | OUTPATIENT
Start: 2018-11-21 | End: 2018-11-21

## 2018-11-21 RX ORDER — SODIUM CHLORIDE, SODIUM LACTATE, POTASSIUM CHLORIDE, CALCIUM CHLORIDE 600; 310; 30; 20 MG/100ML; MG/100ML; MG/100ML; MG/100ML
INJECTION, SOLUTION INTRAVENOUS CONTINUOUS
Status: DISCONTINUED | OUTPATIENT
Start: 2018-11-21 | End: 2018-11-23

## 2018-11-21 RX ORDER — MORPHINE SULFATE 4 MG/ML
2 INJECTION, SOLUTION INTRAMUSCULAR; INTRAVENOUS EVERY 5 MIN PRN
Status: ACTIVE | OUTPATIENT
Start: 2018-11-21 | End: 2018-11-21

## 2018-11-21 RX ORDER — GLYCOPYRROLATE 0.2 MG/ML
0.2 INJECTION, SOLUTION INTRAMUSCULAR; INTRAVENOUS ONCE
Status: COMPLETED | OUTPATIENT
Start: 2018-11-21 | End: 2018-11-21

## 2018-11-21 RX ORDER — HYDROCODONE BITARTRATE AND ACETAMINOPHEN 5; 325 MG/1; MG/1
2 TABLET ORAL AS NEEDED
Status: DISCONTINUED | OUTPATIENT
Start: 2018-11-21 | End: 2018-11-23

## 2018-11-21 RX ADMIN — GLYCOPYRROLATE 0.2 MG: 0.2 INJECTION, SOLUTION INTRAMUSCULAR; INTRAVENOUS at 06:56:00

## 2018-11-21 RX ADMIN — SODIUM CHLORIDE, SODIUM LACTATE, POTASSIUM CHLORIDE, CALCIUM CHLORIDE: 600; 310; 30; 20 INJECTION, SOLUTION INTRAVENOUS at 06:56:00

## 2018-11-21 NOTE — PROGRESS NOTES
[facility] / [facility]  ECT Procedure Note    Rakesh King Patient Status:  Outpatient   Age/Gender 69year old female MRN [id number]   Location [address] [facility] Attending Allison Valentino MD   Hosp Day # 0 PCP Ta

## 2018-11-21 NOTE — PROGRESS NOTES
Senait Landry / [facility]  ECT History & Physical    Josh Phipps Patient Status:  Outpatient   Age/Gender 69year old female MRN [id number]   Location [address] [facility] Attending Tori Man MD   Hosp Day # 0 PCP Robert Lynne N/A 3/3/2017    Performed by Stefanie Barcenas MD at [facility] MAIN OR   • CHEMOTHERAPY Left 1998    left breast cancer with mastectomy, chemo and radiation.    • COLONOSCOPY      tublar adenoma   • ECT PROVIDED Bilateral 11/2014    multiple ECT's every 3 weeks or so They understand and agree to proceed with plan of care.     Roe Low

## 2018-12-05 RX ORDER — SODIUM CHLORIDE, SODIUM LACTATE, POTASSIUM CHLORIDE, CALCIUM CHLORIDE 600; 310; 30; 20 MG/100ML; MG/100ML; MG/100ML; MG/100ML
INJECTION, SOLUTION INTRAVENOUS CONTINUOUS
Status: CANCELLED | OUTPATIENT
Start: 2018-12-05

## 2018-12-19 ENCOUNTER — HOSPITAL ENCOUNTER (OUTPATIENT)
Dept: POSTOP/PACU | Facility: HOSPITAL | Age: 78
Discharge: HOME | End: 2018-12-19
Attending: Other
Payer: MEDICARE

## 2018-12-19 VITALS
RESPIRATION RATE: 16 BRPM | SYSTOLIC BLOOD PRESSURE: 144 MMHG | HEART RATE: 75 BPM | HEIGHT: 61 IN | OXYGEN SATURATION: 92 % | DIASTOLIC BLOOD PRESSURE: 103 MMHG | TEMPERATURE: 99 F | BODY MASS INDEX: 31 KG/M2

## 2018-12-19 DIAGNOSIS — F31.2 SEVERE MANIC BIPOLAR I DISORDER WITH PSYCHOTIC FEATURES (HCC): ICD-10-CM

## 2018-12-19 DIAGNOSIS — F31.64 SEVERE BIPOLAR I DISORDER, MOST RECENT EPISODE MIXED, WITH PSYCHOTIC FEATURES (HCC): ICD-10-CM

## 2018-12-19 RX ORDER — ONDANSETRON 2 MG/ML
4 INJECTION INTRAMUSCULAR; INTRAVENOUS AS NEEDED
Status: ACTIVE | OUTPATIENT
Start: 2018-12-19 | End: 2018-12-19

## 2018-12-19 RX ORDER — SODIUM CHLORIDE, SODIUM LACTATE, POTASSIUM CHLORIDE, CALCIUM CHLORIDE 600; 310; 30; 20 MG/100ML; MG/100ML; MG/100ML; MG/100ML
INJECTION, SOLUTION INTRAVENOUS CONTINUOUS
Status: DISCONTINUED | OUTPATIENT
Start: 2018-12-19 | End: 2018-12-21

## 2018-12-19 RX ORDER — GLYCOPYRROLATE 0.2 MG/ML
0.2 INJECTION, SOLUTION INTRAMUSCULAR; INTRAVENOUS ONCE
Status: COMPLETED | OUTPATIENT
Start: 2018-12-19 | End: 2018-12-19

## 2018-12-19 RX ORDER — ACETAMINOPHEN 500 MG
500 TABLET ORAL EVERY 6 HOURS PRN
Status: DISCONTINUED | OUTPATIENT
Start: 2018-12-19 | End: 2018-12-21

## 2018-12-19 RX ORDER — DEXTROSE MONOHYDRATE 25 G/50ML
50 INJECTION, SOLUTION INTRAVENOUS
Status: DISCONTINUED | OUTPATIENT
Start: 2018-12-19 | End: 2018-12-21

## 2018-12-19 RX ORDER — GLYCOPYRROLATE 0.2 MG/ML
INJECTION, SOLUTION INTRAMUSCULAR; INTRAVENOUS
Status: COMPLETED
Start: 2018-12-19 | End: 2018-12-19

## 2018-12-19 RX ORDER — NALOXONE HYDROCHLORIDE 0.4 MG/ML
80 INJECTION, SOLUTION INTRAMUSCULAR; INTRAVENOUS; SUBCUTANEOUS AS NEEDED
Status: ACTIVE | OUTPATIENT
Start: 2018-12-19 | End: 2018-12-19

## 2018-12-19 RX ORDER — ACETAMINOPHEN 500 MG
1000 TABLET ORAL EVERY 6 HOURS PRN
Status: DISCONTINUED | OUTPATIENT
Start: 2018-12-19 | End: 2018-12-21

## 2018-12-19 RX ADMIN — GLYCOPYRROLATE 0.2 MG: 0.2 INJECTION, SOLUTION INTRAMUSCULAR; INTRAVENOUS at 07:02:00

## 2018-12-19 NOTE — PROGRESS NOTES
Brutus Mcardle / [facility]  ECT History & Physical    Jena Ashia Patient Status:  Outpatient   Age/Gender 69year old female MRN [id number]   Location [address] [facility] Attending Ryan Hawkins MD   Hosp Day # 0 PCP Trenton Levi N/A 3/3/2017    Performed by Rabia Hernandez MD at [address] [facility] OR   • CHEMOTHERAPY Left 1998    left breast cancer with mastectomy, chemo and radiation.    • COLONOSCOPY      tublar adenoma   • ECT PROVIDED Bilateral 11/2014    multiple ECT's every 3 weeks or so to proceed with plan of care.     Johnny Coombs

## 2018-12-19 NOTE — PROGRESS NOTES
[facility] / [facility]  ECT Procedure Note    Rakesh King Patient Status:  Outpatient   Age/Gender 69year old female MRN [id number]   Location [address] [facility] Attending Allison Valentino MD   Hosp Day # 0 PCP Ta

## 2018-12-28 RX ORDER — SODIUM CHLORIDE, SODIUM LACTATE, POTASSIUM CHLORIDE, CALCIUM CHLORIDE 600; 310; 30; 20 MG/100ML; MG/100ML; MG/100ML; MG/100ML
INJECTION, SOLUTION INTRAVENOUS CONTINUOUS
Status: CANCELLED | OUTPATIENT
Start: 2018-12-28

## 2018-12-28 RX ORDER — DEXTROSE MONOHYDRATE 25 G/50ML
50 INJECTION, SOLUTION INTRAVENOUS
Status: CANCELLED | OUTPATIENT
Start: 2018-12-28

## 2019-01-09 ENCOUNTER — HOSPITAL ENCOUNTER (OUTPATIENT)
Dept: POSTOP/PACU | Facility: HOSPITAL | Age: 79
Discharge: HOME | End: 2019-01-09
Attending: Other
Payer: MEDICARE

## 2019-01-09 VITALS
OXYGEN SATURATION: 93 % | SYSTOLIC BLOOD PRESSURE: 180 MMHG | RESPIRATION RATE: 13 BRPM | DIASTOLIC BLOOD PRESSURE: 94 MMHG | BODY MASS INDEX: 31 KG/M2 | HEART RATE: 75 BPM | TEMPERATURE: 98 F | HEIGHT: 61 IN

## 2019-01-09 DIAGNOSIS — F31.64 SEVERE BIPOLAR I DISORDER, MOST RECENT EPISODE MIXED, WITH PSYCHOTIC FEATURES (HCC): ICD-10-CM

## 2019-01-09 DIAGNOSIS — F31.2 SEVERE MANIC BIPOLAR I DISORDER WITH PSYCHOTIC FEATURES (HCC): ICD-10-CM

## 2019-01-09 LAB — GLUCOSE BLD-MCNC: 96 MG/DL (ref 65–99)

## 2019-01-09 RX ORDER — DEXTROSE MONOHYDRATE 25 G/50ML
50 INJECTION, SOLUTION INTRAVENOUS
Status: DISCONTINUED | OUTPATIENT
Start: 2019-01-09 | End: 2019-01-11

## 2019-01-09 RX ORDER — METOCLOPRAMIDE HYDROCHLORIDE 5 MG/ML
10 INJECTION INTRAMUSCULAR; INTRAVENOUS AS NEEDED
Status: ACTIVE | OUTPATIENT
Start: 2019-01-09 | End: 2019-01-09

## 2019-01-09 RX ORDER — NALOXONE HYDROCHLORIDE 0.4 MG/ML
80 INJECTION, SOLUTION INTRAMUSCULAR; INTRAVENOUS; SUBCUTANEOUS AS NEEDED
Status: ACTIVE | OUTPATIENT
Start: 2019-01-09 | End: 2019-01-09

## 2019-01-09 RX ORDER — HYDROCODONE BITARTRATE AND ACETAMINOPHEN 5; 325 MG/1; MG/1
1 TABLET ORAL AS NEEDED
Status: DISCONTINUED | OUTPATIENT
Start: 2019-01-09 | End: 2019-01-11

## 2019-01-09 RX ORDER — HYDROMORPHONE HYDROCHLORIDE 1 MG/ML
0.4 INJECTION, SOLUTION INTRAMUSCULAR; INTRAVENOUS; SUBCUTANEOUS EVERY 5 MIN PRN
Status: ACTIVE | OUTPATIENT
Start: 2019-01-09 | End: 2019-01-09

## 2019-01-09 RX ORDER — SODIUM CHLORIDE, SODIUM LACTATE, POTASSIUM CHLORIDE, CALCIUM CHLORIDE 600; 310; 30; 20 MG/100ML; MG/100ML; MG/100ML; MG/100ML
INJECTION, SOLUTION INTRAVENOUS CONTINUOUS
Status: DISCONTINUED | OUTPATIENT
Start: 2019-01-09 | End: 2019-01-11

## 2019-01-09 RX ORDER — ACETAMINOPHEN 500 MG
1000 TABLET ORAL ONCE AS NEEDED
Status: ACTIVE | OUTPATIENT
Start: 2019-01-09 | End: 2019-01-09

## 2019-01-09 RX ORDER — MEPERIDINE HYDROCHLORIDE 25 MG/ML
12.5 INJECTION INTRAMUSCULAR; INTRAVENOUS; SUBCUTANEOUS AS NEEDED
Status: DISCONTINUED | OUTPATIENT
Start: 2019-01-09 | End: 2019-01-11

## 2019-01-09 RX ORDER — LABETALOL HYDROCHLORIDE 5 MG/ML
5 INJECTION, SOLUTION INTRAVENOUS EVERY 5 MIN PRN
Status: ACTIVE | OUTPATIENT
Start: 2019-01-09 | End: 2019-01-09

## 2019-01-09 RX ORDER — DEXAMETHASONE SODIUM PHOSPHATE 4 MG/ML
4 VIAL (ML) INJECTION AS NEEDED
Status: ACTIVE | OUTPATIENT
Start: 2019-01-09 | End: 2019-01-09

## 2019-01-09 RX ORDER — GLYCOPYRROLATE 0.2 MG/ML
INJECTION, SOLUTION INTRAMUSCULAR; INTRAVENOUS
Status: COMPLETED
Start: 2019-01-09 | End: 2019-01-09

## 2019-01-09 RX ORDER — GLYCOPYRROLATE 0.2 MG/ML
0.2 INJECTION, SOLUTION INTRAMUSCULAR; INTRAVENOUS ONCE
Status: COMPLETED | OUTPATIENT
Start: 2019-01-09 | End: 2019-01-09

## 2019-01-09 RX ORDER — HYDROCODONE BITARTRATE AND ACETAMINOPHEN 5; 325 MG/1; MG/1
2 TABLET ORAL AS NEEDED
Status: DISCONTINUED | OUTPATIENT
Start: 2019-01-09 | End: 2019-01-11

## 2019-01-09 RX ORDER — ONDANSETRON 2 MG/ML
4 INJECTION INTRAMUSCULAR; INTRAVENOUS AS NEEDED
Status: ACTIVE | OUTPATIENT
Start: 2019-01-09 | End: 2019-01-09

## 2019-01-09 RX ORDER — MIDAZOLAM HYDROCHLORIDE 1 MG/ML
1 INJECTION INTRAMUSCULAR; INTRAVENOUS EVERY 5 MIN PRN
Status: ACTIVE | OUTPATIENT
Start: 2019-01-09 | End: 2019-01-09

## 2019-01-09 RX ADMIN — GLYCOPYRROLATE 0.2 MG: 0.2 INJECTION, SOLUTION INTRAMUSCULAR; INTRAVENOUS at 06:50:00

## 2019-01-09 NOTE — PROGRESS NOTES
Kavon London / [facility]  ECT History & Physical    Brian Lopez Patient Status:  Outpatient   Age/Gender 69year old female MRN [id number]   Location [address] [facility] Attending Kyrie Arizmendi MD   Hosp Day # 0 PCP Emily Pandya N/A 3/3/2017    Performed by Kal Porter MD at [facility] MAIN OR   • CHEMOTHERAPY Left 1998    left breast cancer with mastectomy, chemo and radiation.    • COLONOSCOPY      tublar adenoma   • ECT PROVIDED Bilateral 11/2014    multiple ECT's every 3 weeks or so the risks and benefits and alternatives with the patient/family. They understand and agree to proceed with plan of care.     Suzanne León

## 2019-01-09 NOTE — PROGRESS NOTES
[facility] / [facility]  ECT Procedure Note    Veleria Flattery Patient Status:  Outpatient   Age/Gender 69year old female MRN [id number]   Location [address] [facility] Attending Artemio Tomlin MD   Hosp Day # 0 PCP Ta

## 2019-01-23 VITALS — HEIGHT: 62 IN | BODY MASS INDEX: 30 KG/M2

## 2019-01-23 RX ORDER — DEXTROSE MONOHYDRATE 25 G/50ML
50 INJECTION, SOLUTION INTRAVENOUS
Status: CANCELLED | OUTPATIENT
Start: 2019-01-23

## 2019-01-23 RX ORDER — SODIUM CHLORIDE, SODIUM LACTATE, POTASSIUM CHLORIDE, CALCIUM CHLORIDE 600; 310; 30; 20 MG/100ML; MG/100ML; MG/100ML; MG/100ML
INJECTION, SOLUTION INTRAVENOUS CONTINUOUS
Status: CANCELLED | OUTPATIENT
Start: 2019-01-23

## 2019-01-30 ENCOUNTER — HOSPITAL ENCOUNTER (OUTPATIENT)
Dept: POSTOP/PACU | Facility: HOSPITAL | Age: 79
Discharge: HOME | End: 2019-01-30
Attending: Other
Payer: MEDICARE

## 2019-02-07 RX ORDER — SODIUM CHLORIDE, SODIUM LACTATE, POTASSIUM CHLORIDE, CALCIUM CHLORIDE 600; 310; 30; 20 MG/100ML; MG/100ML; MG/100ML; MG/100ML
INJECTION, SOLUTION INTRAVENOUS CONTINUOUS
Status: CANCELLED | OUTPATIENT
Start: 2019-02-07

## 2019-02-13 ENCOUNTER — HOSPITAL ENCOUNTER (OUTPATIENT)
Dept: POSTOP/PACU | Facility: HOSPITAL | Age: 79
Discharge: HOME | End: 2019-02-13
Attending: Other
Payer: MEDICARE

## 2019-02-13 VITALS
BODY MASS INDEX: 30 KG/M2 | OXYGEN SATURATION: 90 % | HEART RATE: 77 BPM | SYSTOLIC BLOOD PRESSURE: 174 MMHG | RESPIRATION RATE: 13 BRPM | DIASTOLIC BLOOD PRESSURE: 88 MMHG | HEIGHT: 62 IN | TEMPERATURE: 98 F

## 2019-02-13 DIAGNOSIS — F31.2 SEVERE MANIC BIPOLAR I DISORDER WITH PSYCHOTIC FEATURES (HCC): ICD-10-CM

## 2019-02-13 DIAGNOSIS — F31.64 SEVERE BIPOLAR I DISORDER, MOST RECENT EPISODE MIXED, WITH PSYCHOTIC FEATURES (HCC): ICD-10-CM

## 2019-02-13 LAB
GLUCOSE BLD-MCNC: 264 MG/DL (ref 70–99)
GLUCOSE BLD-MCNC: 305 MG/DL (ref 70–99)

## 2019-02-13 PROCEDURE — 82962 GLUCOSE BLOOD TEST: CPT

## 2019-02-13 RX ORDER — ONDANSETRON 2 MG/ML
4 INJECTION INTRAMUSCULAR; INTRAVENOUS AS NEEDED
Status: ACTIVE | OUTPATIENT
Start: 2019-02-13 | End: 2019-02-13

## 2019-02-13 RX ORDER — SODIUM CHLORIDE, SODIUM LACTATE, POTASSIUM CHLORIDE, CALCIUM CHLORIDE 600; 310; 30; 20 MG/100ML; MG/100ML; MG/100ML; MG/100ML
INJECTION, SOLUTION INTRAVENOUS CONTINUOUS
Status: DISCONTINUED | OUTPATIENT
Start: 2019-02-13 | End: 2019-02-13

## 2019-02-13 RX ORDER — METOCLOPRAMIDE HYDROCHLORIDE 5 MG/ML
10 INJECTION INTRAMUSCULAR; INTRAVENOUS AS NEEDED
Status: ACTIVE | OUTPATIENT
Start: 2019-02-13 | End: 2019-02-13

## 2019-02-13 RX ORDER — INSULIN ASPART 100 [IU]/ML
INJECTION, SOLUTION INTRAVENOUS; SUBCUTANEOUS
Status: DISCONTINUED
Start: 2019-02-13 | End: 2019-02-13 | Stop reason: WASHOUT

## 2019-02-13 RX ORDER — GLYCOPYRROLATE 0.2 MG/ML
0.2 INJECTION, SOLUTION INTRAMUSCULAR; INTRAVENOUS ONCE
Status: DISCONTINUED | OUTPATIENT
Start: 2019-02-13 | End: 2019-02-13

## 2019-02-13 RX ORDER — NALOXONE HYDROCHLORIDE 0.4 MG/ML
80 INJECTION, SOLUTION INTRAMUSCULAR; INTRAVENOUS; SUBCUTANEOUS AS NEEDED
Status: ACTIVE | OUTPATIENT
Start: 2019-02-13 | End: 2019-02-13

## 2019-02-13 RX ORDER — GLYCOPYRROLATE 0.2 MG/ML
0.2 INJECTION, SOLUTION INTRAMUSCULAR; INTRAVENOUS ONCE
Status: COMPLETED | OUTPATIENT
Start: 2019-02-13 | End: 2019-02-13

## 2019-02-13 RX ORDER — HYDROCODONE BITARTRATE AND ACETAMINOPHEN 10; 325 MG/1; MG/1
1 TABLET ORAL AS NEEDED
Status: DISCONTINUED | OUTPATIENT
Start: 2019-02-13 | End: 2019-02-15

## 2019-02-13 RX ORDER — GLYCOPYRROLATE 0.2 MG/ML
INJECTION, SOLUTION INTRAMUSCULAR; INTRAVENOUS
Status: COMPLETED
Start: 2019-02-13 | End: 2019-02-13

## 2019-02-13 RX ORDER — HYDROCODONE BITARTRATE AND ACETAMINOPHEN 10; 325 MG/1; MG/1
2 TABLET ORAL AS NEEDED
Status: DISCONTINUED | OUTPATIENT
Start: 2019-02-13 | End: 2019-02-15

## 2019-02-13 RX ORDER — DEXTROSE MONOHYDRATE 25 G/50ML
50 INJECTION, SOLUTION INTRAVENOUS
Status: DISCONTINUED | OUTPATIENT
Start: 2019-02-13 | End: 2019-02-13

## 2019-02-13 RX ORDER — SODIUM CHLORIDE, SODIUM LACTATE, POTASSIUM CHLORIDE, CALCIUM CHLORIDE 600; 310; 30; 20 MG/100ML; MG/100ML; MG/100ML; MG/100ML
INJECTION, SOLUTION INTRAVENOUS CONTINUOUS
Status: DISCONTINUED | OUTPATIENT
Start: 2019-02-13 | End: 2019-02-15

## 2019-02-13 RX ORDER — DEXTROSE MONOHYDRATE 25 G/50ML
50 INJECTION, SOLUTION INTRAVENOUS
Status: DISCONTINUED | OUTPATIENT
Start: 2019-02-13 | End: 2019-02-15

## 2019-02-13 RX ORDER — HYDROMORPHONE HYDROCHLORIDE 1 MG/ML
0.4 INJECTION, SOLUTION INTRAMUSCULAR; INTRAVENOUS; SUBCUTANEOUS EVERY 5 MIN PRN
Status: ACTIVE | OUTPATIENT
Start: 2019-02-13 | End: 2019-02-13

## 2019-02-13 RX ADMIN — SODIUM CHLORIDE, SODIUM LACTATE, POTASSIUM CHLORIDE, CALCIUM CHLORIDE: 600; 310; 30; 20 INJECTION, SOLUTION INTRAVENOUS at 06:55:00

## 2019-02-13 RX ADMIN — GLYCOPYRROLATE 0.2 MG: 0.2 INJECTION, SOLUTION INTRAMUSCULAR; INTRAVENOUS at 06:55:00

## 2019-02-13 NOTE — PROGRESS NOTES
[facility] / [facility]  ECT Procedure Note    Analisa Wells Patient Status:  Outpatient   Age/Gender 69year old female MRN [id number]   Location [address] [facility] Attending Brii Gan MD   Hosp Day # 0 PCP Ta

## 2019-02-13 NOTE — PROGRESS NOTES
Wyatt Bland / [facility]  ECT History & Physical    Ally Greg Patient Status:  Outpatient   Age/Gender 69year old female MRN [id number]   Location [address] [facility] Attending Judi Pitts MD   Hosp Day # 0 PCP Frederic Mcneill PORT-A-CATH N/A 3/3/2017    Performed by Morgan Corley MD at [facility] MAIN OR   • CHEMOTHERAPY Left 1998    left breast cancer with mastectomy, chemo and radiation.    • COLONOSCOPY      tublar adenoma   • ECT PROVIDED Bilateral 11/2014    multiple ECT's every 3 risks and benefits and alternatives with the patient/family. They understand and agree to proceed with plan of care.     [facility]

## 2019-03-06 RX ORDER — SODIUM CHLORIDE, SODIUM LACTATE, POTASSIUM CHLORIDE, CALCIUM CHLORIDE 600; 310; 30; 20 MG/100ML; MG/100ML; MG/100ML; MG/100ML
INJECTION, SOLUTION INTRAVENOUS CONTINUOUS
Status: CANCELLED | OUTPATIENT
Start: 2019-03-06

## 2019-03-07 ENCOUNTER — HOSPITAL ENCOUNTER (OUTPATIENT)
Dept: POSTOP/PACU | Facility: HOSPITAL | Age: 79
Discharge: HOME | End: 2019-03-07
Attending: Other
Payer: MEDICARE

## 2019-03-07 VITALS
HEART RATE: 83 BPM | HEIGHT: 62 IN | RESPIRATION RATE: 16 BRPM | OXYGEN SATURATION: 93 % | SYSTOLIC BLOOD PRESSURE: 166 MMHG | BODY MASS INDEX: 30 KG/M2 | DIASTOLIC BLOOD PRESSURE: 93 MMHG

## 2019-03-07 DIAGNOSIS — F31.2 SEVERE MANIC BIPOLAR I DISORDER WITH PSYCHOTIC FEATURES (HCC): ICD-10-CM

## 2019-03-07 DIAGNOSIS — F31.64 SEVERE BIPOLAR I DISORDER, MOST RECENT EPISODE MIXED, WITH PSYCHOTIC FEATURES (HCC): ICD-10-CM

## 2019-03-07 LAB
GLUCOSE BLD-MCNC: 342 MG/DL (ref 70–99)
GLUCOSE BLD-MCNC: 350 MG/DL (ref 70–99)

## 2019-03-07 RX ORDER — HYDROCODONE BITARTRATE AND ACETAMINOPHEN 5; 325 MG/1; MG/1
1 TABLET ORAL AS NEEDED
Status: DISCONTINUED | OUTPATIENT
Start: 2019-03-07 | End: 2019-03-09

## 2019-03-07 RX ORDER — ONDANSETRON 2 MG/ML
4 INJECTION INTRAMUSCULAR; INTRAVENOUS AS NEEDED
Status: ACTIVE | OUTPATIENT
Start: 2019-03-07 | End: 2019-03-07

## 2019-03-07 RX ORDER — SODIUM CHLORIDE, SODIUM LACTATE, POTASSIUM CHLORIDE, CALCIUM CHLORIDE 600; 310; 30; 20 MG/100ML; MG/100ML; MG/100ML; MG/100ML
INJECTION, SOLUTION INTRAVENOUS CONTINUOUS
Status: DISCONTINUED | OUTPATIENT
Start: 2019-03-07 | End: 2019-03-09

## 2019-03-07 RX ORDER — NALOXONE HYDROCHLORIDE 0.4 MG/ML
80 INJECTION, SOLUTION INTRAMUSCULAR; INTRAVENOUS; SUBCUTANEOUS AS NEEDED
Status: ACTIVE | OUTPATIENT
Start: 2019-03-07 | End: 2019-03-07

## 2019-03-07 RX ORDER — HYDROCODONE BITARTRATE AND ACETAMINOPHEN 5; 325 MG/1; MG/1
2 TABLET ORAL AS NEEDED
Status: DISCONTINUED | OUTPATIENT
Start: 2019-03-07 | End: 2019-03-09

## 2019-03-07 RX ORDER — DEXTROSE MONOHYDRATE 25 G/50ML
50 INJECTION, SOLUTION INTRAVENOUS
Status: DISCONTINUED | OUTPATIENT
Start: 2019-03-07 | End: 2019-03-09

## 2019-03-07 RX ORDER — HYDROMORPHONE HYDROCHLORIDE 1 MG/ML
0.4 INJECTION, SOLUTION INTRAMUSCULAR; INTRAVENOUS; SUBCUTANEOUS EVERY 5 MIN PRN
Status: ACTIVE | OUTPATIENT
Start: 2019-03-07 | End: 2019-03-07

## 2019-03-07 RX ORDER — GLYCOPYRROLATE 0.2 MG/ML
INJECTION, SOLUTION INTRAMUSCULAR; INTRAVENOUS
Status: COMPLETED
Start: 2019-03-07 | End: 2019-03-07

## 2019-03-07 RX ORDER — INSULIN ASPART 100 [IU]/ML
INJECTION, SOLUTION INTRAVENOUS; SUBCUTANEOUS
Status: DISCONTINUED
Start: 2019-03-07 | End: 2019-03-07 | Stop reason: WASHOUT

## 2019-03-07 RX ORDER — GLYCOPYRROLATE 0.2 MG/ML
0.2 INJECTION, SOLUTION INTRAMUSCULAR; INTRAVENOUS ONCE
Status: COMPLETED | OUTPATIENT
Start: 2019-03-07 | End: 2019-03-07

## 2019-03-07 RX ADMIN — SODIUM CHLORIDE, SODIUM LACTATE, POTASSIUM CHLORIDE, CALCIUM CHLORIDE: 600; 310; 30; 20 INJECTION, SOLUTION INTRAVENOUS at 06:52:00

## 2019-03-07 RX ADMIN — GLYCOPYRROLATE 0.2 MG: 0.2 INJECTION, SOLUTION INTRAMUSCULAR; INTRAVENOUS at 06:52:00

## 2019-03-07 NOTE — PROGRESS NOTES
Tania Melo / [facility]  ECT History & Physical    Chelseachristine Russell Patient Status:  Outpatient   Age/Gender 69year old female MRN [id number]   Location [address] [facility] Attending Brii Gan MD   Hosp Day # 0 PCP David Ellington CATHETER INSERTION PORT-A-CATH N/A 3/3/2017    Performed by Phan Berger MD at [facility] MAIN OR   • CHEMOTHERAPY Left 1998    left breast cancer with mastectomy, chemo and radiation.    • COLONOSCOPY      tublar adenoma   • ECT PROVIDED Bilateral 11/2014    mul benefits and alternatives with the patient/family. They understand and agree to proceed with plan of care.     [place] Nab

## 2019-03-07 NOTE — PROGRESS NOTES
[facility] / [facility]  ECT Procedure Note    Pablito Dillard Patient Status:  Outpatient   Age/Gender 69year old female MRN [id number]   Location [address] [facility] Attending Pino Francis MD   Hosp Day # 0 PCP Ta

## 2019-03-13 RX ORDER — SODIUM CHLORIDE, SODIUM LACTATE, POTASSIUM CHLORIDE, CALCIUM CHLORIDE 600; 310; 30; 20 MG/100ML; MG/100ML; MG/100ML; MG/100ML
INJECTION, SOLUTION INTRAVENOUS CONTINUOUS
Status: CANCELLED | OUTPATIENT
Start: 2019-03-13

## 2019-03-19 PROBLEM — E03.9 HYPOTHYROIDISM, UNSPECIFIED TYPE: Status: ACTIVE | Noted: 2019-03-19

## 2019-03-19 PROBLEM — E11.649 TYPE 2 DIABETES MELLITUS WITH HYPOGLYCEMIA WITHOUT COMA, WITH LONG-TERM CURRENT USE OF INSULIN (HCC): Status: RESOLVED | Noted: 2018-10-26 | Resolved: 2019-03-19

## 2019-03-19 PROBLEM — R09.02 HYPOXIA: Status: RESOLVED | Noted: 2018-06-27 | Resolved: 2019-03-19

## 2019-03-19 PROBLEM — E11.65 UNCONTROLLED TYPE 2 DIABETES MELLITUS WITH HYPERGLYCEMIA, WITH LONG-TERM CURRENT USE OF INSULIN (HCC): Status: RESOLVED | Noted: 2018-10-26 | Resolved: 2019-03-19

## 2019-03-19 PROBLEM — Z79.4 TYPE 2 DIABETES MELLITUS WITH HYPOGLYCEMIA WITHOUT COMA, WITH LONG-TERM CURRENT USE OF INSULIN (HCC): Status: RESOLVED | Noted: 2018-10-26 | Resolved: 2019-03-19

## 2019-03-19 PROBLEM — Z79.4 LONG-TERM INSULIN USE (HCC): Status: RESOLVED | Noted: 2018-10-26 | Resolved: 2019-03-19

## 2019-03-19 PROBLEM — Z79.4 UNCONTROLLED TYPE 2 DIABETES MELLITUS WITH HYPERGLYCEMIA, WITH LONG-TERM CURRENT USE OF INSULIN (HCC): Status: RESOLVED | Noted: 2018-10-26 | Resolved: 2019-03-19

## 2019-03-27 ENCOUNTER — HOSPITAL ENCOUNTER (OUTPATIENT)
Dept: POSTOP/PACU | Facility: HOSPITAL | Age: 79
Discharge: HOME | End: 2019-03-27
Attending: Other
Payer: MEDICARE

## 2019-03-27 VITALS
HEART RATE: 92 BPM | RESPIRATION RATE: 19 BRPM | TEMPERATURE: 99 F | SYSTOLIC BLOOD PRESSURE: 153 MMHG | BODY MASS INDEX: 30 KG/M2 | HEIGHT: 62 IN | OXYGEN SATURATION: 94 % | DIASTOLIC BLOOD PRESSURE: 89 MMHG

## 2019-03-27 DIAGNOSIS — F31.64 SEVERE BIPOLAR I DISORDER, MOST RECENT EPISODE MIXED, WITH PSYCHOTIC FEATURES (HCC): ICD-10-CM

## 2019-03-27 DIAGNOSIS — F31.2 SEVERE MANIC BIPOLAR I DISORDER WITH PSYCHOTIC FEATURES (HCC): ICD-10-CM

## 2019-03-27 LAB — GLUCOSE BLD-MCNC: 240 MG/DL (ref 70–99)

## 2019-03-27 PROCEDURE — 82962 GLUCOSE BLOOD TEST: CPT

## 2019-03-27 RX ORDER — NALOXONE HYDROCHLORIDE 0.4 MG/ML
80 INJECTION, SOLUTION INTRAMUSCULAR; INTRAVENOUS; SUBCUTANEOUS AS NEEDED
Status: ACTIVE | OUTPATIENT
Start: 2019-03-27 | End: 2019-03-27

## 2019-03-27 RX ORDER — LABETALOL HYDROCHLORIDE 5 MG/ML
5 INJECTION, SOLUTION INTRAVENOUS EVERY 5 MIN PRN
Status: ACTIVE | OUTPATIENT
Start: 2019-03-27 | End: 2019-03-27

## 2019-03-27 RX ORDER — GLYCOPYRROLATE 0.2 MG/ML
INJECTION, SOLUTION INTRAMUSCULAR; INTRAVENOUS
Status: COMPLETED
Start: 2019-03-27 | End: 2019-03-27

## 2019-03-27 RX ORDER — HYDROCODONE BITARTRATE AND ACETAMINOPHEN 5; 325 MG/1; MG/1
1 TABLET ORAL AS NEEDED
Status: DISCONTINUED | OUTPATIENT
Start: 2019-03-27 | End: 2019-03-29

## 2019-03-27 RX ORDER — MIDAZOLAM HYDROCHLORIDE 1 MG/ML
1 INJECTION INTRAMUSCULAR; INTRAVENOUS EVERY 5 MIN PRN
Status: ACTIVE | OUTPATIENT
Start: 2019-03-27 | End: 2019-03-27

## 2019-03-27 RX ORDER — ONDANSETRON 2 MG/ML
4 INJECTION INTRAMUSCULAR; INTRAVENOUS AS NEEDED
Status: ACTIVE | OUTPATIENT
Start: 2019-03-27 | End: 2019-03-27

## 2019-03-27 RX ORDER — DEXAMETHASONE SODIUM PHOSPHATE 4 MG/ML
4 VIAL (ML) INJECTION AS NEEDED
Status: ACTIVE | OUTPATIENT
Start: 2019-03-27 | End: 2019-03-27

## 2019-03-27 RX ORDER — HYDROCODONE BITARTRATE AND ACETAMINOPHEN 5; 325 MG/1; MG/1
2 TABLET ORAL AS NEEDED
Status: DISCONTINUED | OUTPATIENT
Start: 2019-03-27 | End: 2019-03-29

## 2019-03-27 RX ORDER — GLYCOPYRROLATE 0.2 MG/ML
0.2 INJECTION, SOLUTION INTRAMUSCULAR; INTRAVENOUS ONCE
Status: COMPLETED | OUTPATIENT
Start: 2019-03-27 | End: 2019-03-27

## 2019-03-27 RX ORDER — SODIUM CHLORIDE, SODIUM LACTATE, POTASSIUM CHLORIDE, CALCIUM CHLORIDE 600; 310; 30; 20 MG/100ML; MG/100ML; MG/100ML; MG/100ML
INJECTION, SOLUTION INTRAVENOUS CONTINUOUS
Status: DISCONTINUED | OUTPATIENT
Start: 2019-03-27 | End: 2019-03-29

## 2019-03-27 RX ORDER — DIPHENHYDRAMINE HYDROCHLORIDE 50 MG/ML
12.5 INJECTION INTRAMUSCULAR; INTRAVENOUS AS NEEDED
Status: ACTIVE | OUTPATIENT
Start: 2019-03-27 | End: 2019-03-27

## 2019-03-27 RX ORDER — DEXTROSE MONOHYDRATE 25 G/50ML
50 INJECTION, SOLUTION INTRAVENOUS
Status: DISCONTINUED | OUTPATIENT
Start: 2019-03-27 | End: 2019-03-29

## 2019-03-27 RX ORDER — HYDROMORPHONE HYDROCHLORIDE 1 MG/ML
0.4 INJECTION, SOLUTION INTRAMUSCULAR; INTRAVENOUS; SUBCUTANEOUS EVERY 5 MIN PRN
Status: ACTIVE | OUTPATIENT
Start: 2019-03-27 | End: 2019-03-27

## 2019-03-27 RX ADMIN — GLYCOPYRROLATE 0.2 MG: 0.2 INJECTION, SOLUTION INTRAMUSCULAR; INTRAVENOUS at 06:43:00

## 2019-03-27 RX ADMIN — SODIUM CHLORIDE, SODIUM LACTATE, POTASSIUM CHLORIDE, CALCIUM CHLORIDE: 600; 310; 30; 20 INJECTION, SOLUTION INTRAVENOUS at 06:43:00

## 2019-03-27 NOTE — PROGRESS NOTES
Christie Gonzalez / [facility]  ECT History & Physical    Rosiland Spurling Patient Status:  Outpatient   Age/Gender 69year old female MRN [id number]   Location [address] [facility] Attending Jeffery Stacy MD   Hosp Day # 0 PCP Jessica Gastelum PORT-A-CATH N/A 3/3/2017    Performed by Brent Rich MD at [facility] MAIN OR   • CHEMOTHERAPY Left 1998    left breast cancer with mastectomy, chemo and radiation.    • COLONOSCOPY      tublar adenoma   • ECT PROVIDED Bilateral 11/2014    multiple ECT's every 3 Bifrontal ECT    I have discussed the risks and benefits and alternatives with the patient/family. They understand and agree to proceed with plan of care.     Roe Low

## 2019-03-27 NOTE — PROGRESS NOTES
[facility] / [facility]  ECT Procedure Note    Veleria Flattery Patient Status:  Outpatient   Age/Gender 69year old female MRN [id number]   Location [address] [facility] Attending Artemio Tomlin MD   Hosp Day # 0 PCP Ta

## 2019-04-17 ENCOUNTER — HOSPITAL ENCOUNTER (OUTPATIENT)
Dept: POSTOP/PACU | Facility: HOSPITAL | Age: 79
Discharge: HOME | End: 2019-04-17
Attending: Other
Payer: MEDICARE

## 2019-04-17 VITALS
HEIGHT: 61 IN | OXYGEN SATURATION: 99 % | HEART RATE: 84 BPM | BODY MASS INDEX: 32 KG/M2 | RESPIRATION RATE: 13 BRPM | DIASTOLIC BLOOD PRESSURE: 80 MMHG | SYSTOLIC BLOOD PRESSURE: 169 MMHG | TEMPERATURE: 98 F

## 2019-04-17 DIAGNOSIS — F31.64 SEVERE BIPOLAR I DISORDER, MOST RECENT EPISODE MIXED, WITH PSYCHOTIC FEATURES (HCC): ICD-10-CM

## 2019-04-17 DIAGNOSIS — F31.2 SEVERE MANIC BIPOLAR I DISORDER WITH PSYCHOTIC FEATURES (HCC): ICD-10-CM

## 2019-04-17 RX ORDER — GLYCOPYRROLATE 0.2 MG/ML
INJECTION, SOLUTION INTRAMUSCULAR; INTRAVENOUS
Status: COMPLETED
Start: 2019-04-17 | End: 2019-04-17

## 2019-04-17 RX ORDER — SODIUM CHLORIDE, SODIUM LACTATE, POTASSIUM CHLORIDE, CALCIUM CHLORIDE 600; 310; 30; 20 MG/100ML; MG/100ML; MG/100ML; MG/100ML
INJECTION, SOLUTION INTRAVENOUS CONTINUOUS
Status: DISCONTINUED | OUTPATIENT
Start: 2019-04-17 | End: 2019-04-19

## 2019-04-17 RX ORDER — GLYCOPYRROLATE 0.2 MG/ML
0.2 INJECTION, SOLUTION INTRAMUSCULAR; INTRAVENOUS ONCE
Status: COMPLETED | OUTPATIENT
Start: 2019-04-17 | End: 2019-04-17

## 2019-04-17 RX ORDER — DEXTROSE MONOHYDRATE 25 G/50ML
50 INJECTION, SOLUTION INTRAVENOUS
Status: DISCONTINUED | OUTPATIENT
Start: 2019-04-17 | End: 2019-04-19

## 2019-04-17 RX ADMIN — GLYCOPYRROLATE 0.2 MG: 0.2 INJECTION, SOLUTION INTRAMUSCULAR; INTRAVENOUS at 06:41:00

## 2019-04-17 NOTE — PROGRESS NOTES
[facility] / [facility]  ECT Procedure Note    Estela Juarez Patient Status:  Outpatient   Age/Gender 69year old female MRN [id number]   Location [address] [facility] Attending Preethi Hdz MD   Hosp Day # 0 PCP Ta

## 2019-04-17 NOTE — PROGRESS NOTES
Sejal Villela / [facility]  ECT History & Physical    Gavin Montano Patient Status:  Outpatient   Age/Gender 69year old female MRN [id number]   Location [address] [facility] Attending Dawna Valdivia MD   Hosp Day # 0 PCP Dilip Lyn 3/31/2017    Performed by Manda Villafana MD at [facility] MAIN OR   • Radha Vibyvej 8 N/A 3/3/2017    Performed by Manda Villafana MD at [facility] MAIN OR   • CHEMOTHERAPY Left 1998    left breast cancer with mastectomy, chemo and radiation.    • COLONOSCOPY & Plans: Bipolar manic severe with psychotic features. Bifrontal ECT    I have discussed the risks and benefits and alternatives with the patient/family. They understand and agree to proceed with plan of care.     Laura Lopes

## 2019-04-24 VITALS — BODY MASS INDEX: 32 KG/M2 | HEIGHT: 61 IN

## 2019-04-24 RX ORDER — SODIUM CHLORIDE, SODIUM LACTATE, POTASSIUM CHLORIDE, CALCIUM CHLORIDE 600; 310; 30; 20 MG/100ML; MG/100ML; MG/100ML; MG/100ML
INJECTION, SOLUTION INTRAVENOUS CONTINUOUS
Status: CANCELLED | OUTPATIENT
Start: 2019-04-24

## 2019-04-24 RX ORDER — ACETAMINOPHEN 500 MG
1000 TABLET ORAL ONCE
Status: CANCELLED | OUTPATIENT
Start: 2019-04-24 | End: 2019-04-24

## 2019-04-24 RX ORDER — DEXTROSE MONOHYDRATE 25 G/50ML
50 INJECTION, SOLUTION INTRAVENOUS
Status: CANCELLED | OUTPATIENT
Start: 2019-04-24

## 2019-05-08 ENCOUNTER — HOSPITAL ENCOUNTER (OUTPATIENT)
Dept: POSTOP/PACU | Facility: HOSPITAL | Age: 79
Discharge: HOME | End: 2019-05-08
Attending: Other
Payer: MEDICARE

## 2019-05-08 RX ORDER — DEXTROSE MONOHYDRATE 25 G/50ML
50 INJECTION, SOLUTION INTRAVENOUS
Status: CANCELLED | OUTPATIENT
Start: 2019-05-08

## 2019-05-08 RX ORDER — SODIUM CHLORIDE, SODIUM LACTATE, POTASSIUM CHLORIDE, CALCIUM CHLORIDE 600; 310; 30; 20 MG/100ML; MG/100ML; MG/100ML; MG/100ML
INJECTION, SOLUTION INTRAVENOUS CONTINUOUS
Status: CANCELLED | OUTPATIENT
Start: 2019-05-08

## 2019-05-15 ENCOUNTER — HOSPITAL ENCOUNTER (OUTPATIENT)
Dept: POSTOP/PACU | Facility: HOSPITAL | Age: 79
Discharge: HOME | End: 2019-05-15
Attending: Other
Payer: MEDICARE

## 2019-05-15 VITALS
DIASTOLIC BLOOD PRESSURE: 77 MMHG | BODY MASS INDEX: 40.38 KG/M2 | TEMPERATURE: 98 F | HEART RATE: 102 BPM | OXYGEN SATURATION: 92 % | SYSTOLIC BLOOD PRESSURE: 154 MMHG | HEIGHT: 61 IN | WEIGHT: 213.88 LBS | RESPIRATION RATE: 17 BRPM

## 2019-05-15 DIAGNOSIS — F20.89 SIMPLE SCHIZOPHRENIA, CHRONIC CONDITION (HCC): ICD-10-CM

## 2019-05-15 DIAGNOSIS — F31.2 SEVERE MANIC BIPOLAR I DISORDER WITH PSYCHOTIC FEATURES (HCC): ICD-10-CM

## 2019-05-15 PROCEDURE — 82962 GLUCOSE BLOOD TEST: CPT

## 2019-05-15 RX ORDER — GLYCOPYRROLATE 0.2 MG/ML
0.2 INJECTION, SOLUTION INTRAMUSCULAR; INTRAVENOUS ONCE
Status: COMPLETED | OUTPATIENT
Start: 2019-05-15 | End: 2019-05-15

## 2019-05-15 RX ORDER — GLYCOPYRROLATE 0.2 MG/ML
INJECTION, SOLUTION INTRAMUSCULAR; INTRAVENOUS
Status: COMPLETED
Start: 2019-05-15 | End: 2019-05-15

## 2019-05-15 RX ORDER — SODIUM CHLORIDE, SODIUM LACTATE, POTASSIUM CHLORIDE, CALCIUM CHLORIDE 600; 310; 30; 20 MG/100ML; MG/100ML; MG/100ML; MG/100ML
INJECTION, SOLUTION INTRAVENOUS CONTINUOUS
Status: DISCONTINUED | OUTPATIENT
Start: 2019-05-15 | End: 2019-05-17

## 2019-05-15 RX ADMIN — SODIUM CHLORIDE, SODIUM LACTATE, POTASSIUM CHLORIDE, CALCIUM CHLORIDE: 600; 310; 30; 20 INJECTION, SOLUTION INTRAVENOUS at 06:40:00

## 2019-05-15 RX ADMIN — GLYCOPYRROLATE 0.2 MG: 0.2 INJECTION, SOLUTION INTRAMUSCULAR; INTRAVENOUS at 06:40:00

## 2019-05-15 NOTE — OR PREOP
Dr. Lake informed of gs pre and post ect.  Order to inform nursing home to give insulin with breakfast

## 2019-05-15 NOTE — PROGRESS NOTES
Brutus Mcardle / [facility]  ECT History & Physical    Jena Ashia Patient Status:  Outpatient   Age/Gender 69year old female MRN [id number]   Location [address] [facility] Attending Ryan Hawkins MD   Hosp Day # 0 PCP Trenton Levi N/A 3/3/2017    Performed by Osmin Graves MD at [facility] MAIN OR   • CHEMOTHERAPY Left 1998    left breast cancer with mastectomy, chemo and radiation.    • COLONOSCOPY      tublar adenoma   • ECT PROVIDED Bilateral 11/2014    multiple ECT's every 3 weeks or so features. Bifrontal ECT    I have discussed the risks and benefits and alternatives with the patient/family. They understand and agree to proceed with plan of care.     Doc Elliott

## 2019-05-15 NOTE — PROGRESS NOTES
[facility] / [facility]  ECT Procedure Note    Brian Lopez Patient Status:  Outpatient   Age/Gender 69year old female MRN [id number]   Location [address] [facility] Attending Kyrie Arizmendi MD   Hosp Day # 0 PCP Ta

## 2019-05-22 VITALS — HEIGHT: 61 IN | BODY MASS INDEX: 40 KG/M2

## 2019-05-22 RX ORDER — DEXTROSE MONOHYDRATE 25 G/50ML
50 INJECTION, SOLUTION INTRAVENOUS
Status: CANCELLED | OUTPATIENT
Start: 2019-05-22

## 2019-05-22 RX ORDER — SODIUM CHLORIDE, SODIUM LACTATE, POTASSIUM CHLORIDE, CALCIUM CHLORIDE 600; 310; 30; 20 MG/100ML; MG/100ML; MG/100ML; MG/100ML
INJECTION, SOLUTION INTRAVENOUS CONTINUOUS
Status: CANCELLED | OUTPATIENT
Start: 2019-05-22

## 2019-06-05 ENCOUNTER — HOSPITAL ENCOUNTER (OUTPATIENT)
Dept: POSTOP/PACU | Facility: HOSPITAL | Age: 79
Discharge: HOME | End: 2019-06-05
Attending: Other
Payer: MEDICARE

## 2019-06-12 ENCOUNTER — HOSPITAL ENCOUNTER (OUTPATIENT)
Dept: POSTOP/PACU | Facility: HOSPITAL | Age: 79
Discharge: HOME | End: 2019-06-12
Attending: Other
Payer: MEDICARE

## 2019-06-12 VITALS
RESPIRATION RATE: 19 BRPM | OXYGEN SATURATION: 90 % | HEIGHT: 61 IN | BODY MASS INDEX: 40 KG/M2 | HEART RATE: 90 BPM | TEMPERATURE: 98 F | SYSTOLIC BLOOD PRESSURE: 153 MMHG | DIASTOLIC BLOOD PRESSURE: 81 MMHG

## 2019-06-12 DIAGNOSIS — F31.2 SEVERE MANIC BIPOLAR I DISORDER WITH PSYCHOTIC FEATURES (HCC): ICD-10-CM

## 2019-06-12 DIAGNOSIS — F31.64 SEVERE BIPOLAR I DISORDER, MOST RECENT EPISODE MIXED, WITH PSYCHOTIC FEATURES (HCC): ICD-10-CM

## 2019-06-12 RX ORDER — DEXTROSE MONOHYDRATE 25 G/50ML
50 INJECTION, SOLUTION INTRAVENOUS
Status: DISCONTINUED | OUTPATIENT
Start: 2019-06-12 | End: 2019-06-14

## 2019-06-12 RX ORDER — SODIUM CHLORIDE, SODIUM LACTATE, POTASSIUM CHLORIDE, CALCIUM CHLORIDE 600; 310; 30; 20 MG/100ML; MG/100ML; MG/100ML; MG/100ML
INJECTION, SOLUTION INTRAVENOUS CONTINUOUS
Status: DISCONTINUED | OUTPATIENT
Start: 2019-06-12 | End: 2019-06-14

## 2019-06-12 RX ORDER — GLYCOPYRROLATE 0.2 MG/ML
0.2 INJECTION, SOLUTION INTRAMUSCULAR; INTRAVENOUS ONCE
Status: COMPLETED | OUTPATIENT
Start: 2019-06-12 | End: 2019-06-12

## 2019-06-12 RX ORDER — HYDROMORPHONE HYDROCHLORIDE 1 MG/ML
0.4 INJECTION, SOLUTION INTRAMUSCULAR; INTRAVENOUS; SUBCUTANEOUS EVERY 5 MIN PRN
Status: ACTIVE | OUTPATIENT
Start: 2019-06-12 | End: 2019-06-12

## 2019-06-12 RX ORDER — DEXTROSE MONOHYDRATE 25 G/50ML
50 INJECTION, SOLUTION INTRAVENOUS
Status: DISCONTINUED | OUTPATIENT
Start: 2019-06-12 | End: 2019-06-12

## 2019-06-12 RX ORDER — MIDAZOLAM HYDROCHLORIDE 1 MG/ML
1 INJECTION INTRAMUSCULAR; INTRAVENOUS EVERY 5 MIN PRN
Status: ACTIVE | OUTPATIENT
Start: 2019-06-12 | End: 2019-06-12

## 2019-06-12 RX ORDER — GLYCOPYRROLATE 0.2 MG/ML
INJECTION, SOLUTION INTRAMUSCULAR; INTRAVENOUS
Status: COMPLETED
Start: 2019-06-12 | End: 2019-06-12

## 2019-06-12 RX ORDER — ACETAMINOPHEN 500 MG
1000 TABLET ORAL ONCE AS NEEDED
Status: ACTIVE | OUTPATIENT
Start: 2019-06-12 | End: 2019-06-12

## 2019-06-12 RX ORDER — NALOXONE HYDROCHLORIDE 0.4 MG/ML
80 INJECTION, SOLUTION INTRAMUSCULAR; INTRAVENOUS; SUBCUTANEOUS AS NEEDED
Status: ACTIVE | OUTPATIENT
Start: 2019-06-12 | End: 2019-06-12

## 2019-06-12 RX ADMIN — SODIUM CHLORIDE, SODIUM LACTATE, POTASSIUM CHLORIDE, CALCIUM CHLORIDE: 600; 310; 30; 20 INJECTION, SOLUTION INTRAVENOUS at 06:28:00

## 2019-06-12 RX ADMIN — GLYCOPYRROLATE 0.2 MG: 0.2 INJECTION, SOLUTION INTRAMUSCULAR; INTRAVENOUS at 06:28:00

## 2019-06-12 NOTE — PROGRESS NOTES
[facility] / [facility]  ECT Procedure Note    Jesusita Bazan Patient Status:  Outpatient   Age/Gender 67year old female MRN [id number]   Location [address] [facility] Attending Le Call MD   Hosp Day # 0 PCP Ta

## 2019-06-12 NOTE — PROGRESS NOTES
Gia Flores / [facility]  ECT History & Physical    Erum Turcios Patient Status:  Outpatient   Age/Gender 67year old female MRN [id number]   Location [address] [facility] Attending Glennie Cushing, MD   Hosp Day # 0 PCP Chad Garcia INSERTION PORT-A-CATH N/A 3/3/2017    Performed by Donna Gastelum MD at [facility] MAIN OR   • CHEMOTHERAPY Left 1998    left breast cancer with mastectomy, chemo and radiation.    • COLONOSCOPY      tublar adenoma   • ECT PROVIDED Bilateral 11/2014    multiple ECT have discussed the risks and benefits and alternatives with the patient/family. They understand and agree to proceed with plan of care.     Alli Galaviz

## 2019-06-20 RX ORDER — ALBUTEROL SULFATE 90 UG/1
AEROSOL, METERED RESPIRATORY (INHALATION) EVERY 6 HOURS PRN
COMMUNITY

## 2019-06-20 RX ORDER — SODIUM CHLORIDE, SODIUM LACTATE, POTASSIUM CHLORIDE, CALCIUM CHLORIDE 600; 310; 30; 20 MG/100ML; MG/100ML; MG/100ML; MG/100ML
INJECTION, SOLUTION INTRAVENOUS CONTINUOUS
Status: CANCELLED | OUTPATIENT
Start: 2019-06-20

## 2019-07-03 ENCOUNTER — HOSPITAL ENCOUNTER (OUTPATIENT)
Dept: POSTOP/PACU | Facility: HOSPITAL | Age: 79
Discharge: HOME | End: 2019-07-03
Attending: Other
Payer: MEDICARE

## 2019-07-03 VITALS
RESPIRATION RATE: 19 BRPM | SYSTOLIC BLOOD PRESSURE: 152 MMHG | OXYGEN SATURATION: 92 % | TEMPERATURE: 98 F | BODY MASS INDEX: 40.22 KG/M2 | HEART RATE: 83 BPM | HEIGHT: 61 IN | WEIGHT: 213 LBS | DIASTOLIC BLOOD PRESSURE: 96 MMHG

## 2019-07-03 DIAGNOSIS — F31.2 SEVERE MANIC BIPOLAR I DISORDER WITH PSYCHOTIC FEATURES (HCC): ICD-10-CM

## 2019-07-03 DIAGNOSIS — F31.64 SEVERE BIPOLAR I DISORDER, MOST RECENT EPISODE MIXED, WITH PSYCHOTIC FEATURES (HCC): ICD-10-CM

## 2019-07-03 LAB — GLUCOSE BLD-MCNC: 128 MG/DL (ref 70–99)

## 2019-07-03 RX ORDER — ONDANSETRON 2 MG/ML
4 INJECTION INTRAMUSCULAR; INTRAVENOUS AS NEEDED
Status: ACTIVE | OUTPATIENT
Start: 2019-07-03 | End: 2019-07-03

## 2019-07-03 RX ORDER — DEXTROSE MONOHYDRATE 25 G/50ML
50 INJECTION, SOLUTION INTRAVENOUS
Status: DISCONTINUED | OUTPATIENT
Start: 2019-07-03 | End: 2019-07-05

## 2019-07-03 RX ORDER — SODIUM CHLORIDE, SODIUM LACTATE, POTASSIUM CHLORIDE, CALCIUM CHLORIDE 600; 310; 30; 20 MG/100ML; MG/100ML; MG/100ML; MG/100ML
INJECTION, SOLUTION INTRAVENOUS CONTINUOUS
Status: DISCONTINUED | OUTPATIENT
Start: 2019-07-03 | End: 2019-07-05

## 2019-07-03 RX ORDER — GLYCOPYRROLATE 0.2 MG/ML
INJECTION, SOLUTION INTRAMUSCULAR; INTRAVENOUS
Status: COMPLETED
Start: 2019-07-03 | End: 2019-07-03

## 2019-07-03 RX ORDER — LABETALOL HYDROCHLORIDE 5 MG/ML
5 INJECTION, SOLUTION INTRAVENOUS EVERY 5 MIN PRN
Status: ACTIVE | OUTPATIENT
Start: 2019-07-03 | End: 2019-07-03

## 2019-07-03 RX ORDER — GLYCOPYRROLATE 0.2 MG/ML
0.2 INJECTION, SOLUTION INTRAMUSCULAR; INTRAVENOUS ONCE
Status: COMPLETED | OUTPATIENT
Start: 2019-07-03 | End: 2019-07-03

## 2019-07-03 RX ORDER — NALOXONE HYDROCHLORIDE 0.4 MG/ML
80 INJECTION, SOLUTION INTRAMUSCULAR; INTRAVENOUS; SUBCUTANEOUS AS NEEDED
Status: ACTIVE | OUTPATIENT
Start: 2019-07-03 | End: 2019-07-03

## 2019-07-03 RX ADMIN — SODIUM CHLORIDE, SODIUM LACTATE, POTASSIUM CHLORIDE, CALCIUM CHLORIDE: 600; 310; 30; 20 INJECTION, SOLUTION INTRAVENOUS at 06:55:00

## 2019-07-03 RX ADMIN — GLYCOPYRROLATE 0.2 MG: 0.2 INJECTION, SOLUTION INTRAMUSCULAR; INTRAVENOUS at 06:55:00

## 2019-07-03 NOTE — PROGRESS NOTES
[facility] / [facility]  ECT Procedure Note    Amie Dobbs Patient Status:  Outpatient   Age/Gender 67year old female MRN [id number]   Location [address] [facility] Attending Lakeisha Rodrigues MD   Hosp Day # 0 PCP Ta

## 2019-07-03 NOTE — PROGRESS NOTES
Nabil Beltran / [facility]  ECT History & Physical    Fernanda Syed Patient Status:  Outpatient   Age/Gender 67year old female MRN [id number]   Location [address] [facility] Attending Lorena Reynolds MD   Hosp Day # 0 PCP Brian Delgado 3/3/2017    Performed by Juni Dominguez MD at [facility] MAIN OR   • CHEMOTHERAPY Left 1998    left breast cancer with mastectomy, chemo and radiation.    • COLONOSCOPY      tublar adenoma   • ECT PROVIDED Bilateral 11/2014    multiple ECT's every 3 weeks or so las discussed the risks and benefits and alternatives with the patient/family. They understand and agree to proceed with plan of care.     Roe Low

## 2019-07-10 RX ORDER — SODIUM CHLORIDE, SODIUM LACTATE, POTASSIUM CHLORIDE, CALCIUM CHLORIDE 600; 310; 30; 20 MG/100ML; MG/100ML; MG/100ML; MG/100ML
INJECTION, SOLUTION INTRAVENOUS CONTINUOUS
Status: CANCELLED | OUTPATIENT
Start: 2019-07-10

## 2019-07-24 ENCOUNTER — HOSPITAL ENCOUNTER (OUTPATIENT)
Dept: POSTOP/PACU | Facility: HOSPITAL | Age: 79
Discharge: HOME | End: 2019-07-24
Attending: Other
Payer: MEDICARE

## 2019-07-24 VITALS
BODY MASS INDEX: 40 KG/M2 | DIASTOLIC BLOOD PRESSURE: 87 MMHG | TEMPERATURE: 98 F | HEIGHT: 61 IN | OXYGEN SATURATION: 94 % | RESPIRATION RATE: 18 BRPM | SYSTOLIC BLOOD PRESSURE: 161 MMHG | HEART RATE: 95 BPM

## 2019-07-24 DIAGNOSIS — F31.64 SEVERE BIPOLAR I DISORDER, MOST RECENT EPISODE MIXED, WITH PSYCHOTIC FEATURES (HCC): ICD-10-CM

## 2019-07-24 DIAGNOSIS — F31.2 SEVERE MANIC BIPOLAR I DISORDER WITH PSYCHOTIC FEATURES (HCC): ICD-10-CM

## 2019-07-24 LAB — GLUCOSE BLD-MCNC: 172 MG/DL (ref 70–99)

## 2019-07-24 PROCEDURE — 82962 GLUCOSE BLOOD TEST: CPT

## 2019-07-24 RX ORDER — HYDROMORPHONE HYDROCHLORIDE 1 MG/ML
0.4 INJECTION, SOLUTION INTRAMUSCULAR; INTRAVENOUS; SUBCUTANEOUS EVERY 5 MIN PRN
Status: ACTIVE | OUTPATIENT
Start: 2019-07-24 | End: 2019-07-24

## 2019-07-24 RX ORDER — DIPHENHYDRAMINE HYDROCHLORIDE 50 MG/ML
12.5 INJECTION INTRAMUSCULAR; INTRAVENOUS AS NEEDED
Status: ACTIVE | OUTPATIENT
Start: 2019-07-24 | End: 2019-07-24

## 2019-07-24 RX ORDER — NALOXONE HYDROCHLORIDE 0.4 MG/ML
80 INJECTION, SOLUTION INTRAMUSCULAR; INTRAVENOUS; SUBCUTANEOUS AS NEEDED
Status: ACTIVE | OUTPATIENT
Start: 2019-07-24 | End: 2019-07-24

## 2019-07-24 RX ORDER — GLYCOPYRROLATE 0.2 MG/ML
0.2 INJECTION, SOLUTION INTRAMUSCULAR; INTRAVENOUS ONCE
Status: COMPLETED | OUTPATIENT
Start: 2019-07-24 | End: 2019-07-24

## 2019-07-24 RX ORDER — DEXAMETHASONE SODIUM PHOSPHATE 4 MG/ML
4 VIAL (ML) INJECTION AS NEEDED
Status: ACTIVE | OUTPATIENT
Start: 2019-07-24 | End: 2019-07-24

## 2019-07-24 RX ORDER — ONDANSETRON 2 MG/ML
4 INJECTION INTRAMUSCULAR; INTRAVENOUS AS NEEDED
Status: ACTIVE | OUTPATIENT
Start: 2019-07-24 | End: 2019-07-24

## 2019-07-24 RX ORDER — DEXTROSE MONOHYDRATE 25 G/50ML
50 INJECTION, SOLUTION INTRAVENOUS
Status: DISCONTINUED | OUTPATIENT
Start: 2019-07-24 | End: 2019-07-26

## 2019-07-24 RX ORDER — ACETAMINOPHEN 500 MG
1000 TABLET ORAL ONCE AS NEEDED
Status: ACTIVE | OUTPATIENT
Start: 2019-07-24 | End: 2019-07-24

## 2019-07-24 RX ORDER — SODIUM CHLORIDE, SODIUM LACTATE, POTASSIUM CHLORIDE, CALCIUM CHLORIDE 600; 310; 30; 20 MG/100ML; MG/100ML; MG/100ML; MG/100ML
INJECTION, SOLUTION INTRAVENOUS CONTINUOUS
Status: DISCONTINUED | OUTPATIENT
Start: 2019-07-24 | End: 2019-07-26

## 2019-07-24 RX ORDER — HYDROCODONE BITARTRATE AND ACETAMINOPHEN 5; 325 MG/1; MG/1
1 TABLET ORAL AS NEEDED
Status: DISCONTINUED | OUTPATIENT
Start: 2019-07-24 | End: 2019-07-26

## 2019-07-24 RX ORDER — METOCLOPRAMIDE HYDROCHLORIDE 5 MG/ML
10 INJECTION INTRAMUSCULAR; INTRAVENOUS AS NEEDED
Status: ACTIVE | OUTPATIENT
Start: 2019-07-24 | End: 2019-07-24

## 2019-07-24 RX ORDER — LABETALOL HYDROCHLORIDE 5 MG/ML
5 INJECTION, SOLUTION INTRAVENOUS EVERY 5 MIN PRN
Status: ACTIVE | OUTPATIENT
Start: 2019-07-24 | End: 2019-07-24

## 2019-07-24 RX ORDER — GLYCOPYRROLATE 0.2 MG/ML
INJECTION, SOLUTION INTRAMUSCULAR; INTRAVENOUS
Status: COMPLETED
Start: 2019-07-24 | End: 2019-07-24

## 2019-07-24 RX ORDER — IBUPROFEN 600 MG/1
600 TABLET ORAL ONCE AS NEEDED
Status: ACTIVE | OUTPATIENT
Start: 2019-07-24 | End: 2019-07-24

## 2019-07-24 RX ORDER — MIDAZOLAM HYDROCHLORIDE 1 MG/ML
1 INJECTION INTRAMUSCULAR; INTRAVENOUS EVERY 5 MIN PRN
Status: ACTIVE | OUTPATIENT
Start: 2019-07-24 | End: 2019-07-24

## 2019-07-24 RX ORDER — HYDROCODONE BITARTRATE AND ACETAMINOPHEN 5; 325 MG/1; MG/1
2 TABLET ORAL AS NEEDED
Status: DISCONTINUED | OUTPATIENT
Start: 2019-07-24 | End: 2019-07-26

## 2019-07-24 RX ADMIN — SODIUM CHLORIDE, SODIUM LACTATE, POTASSIUM CHLORIDE, CALCIUM CHLORIDE: 600; 310; 30; 20 INJECTION, SOLUTION INTRAVENOUS at 06:52:00

## 2019-07-24 RX ADMIN — GLYCOPYRROLATE 0.2 MG: 0.2 INJECTION, SOLUTION INTRAMUSCULAR; INTRAVENOUS at 06:54:00

## 2019-07-24 RX ADMIN — SODIUM CHLORIDE, SODIUM LACTATE, POTASSIUM CHLORIDE, CALCIUM CHLORIDE: 600; 310; 30; 20 INJECTION, SOLUTION INTRAVENOUS at 06:54:00

## 2019-07-25 NOTE — PROGRESS NOTES
[facility] / [facility]  ECT Procedure Note    Ally Garza Patient Status:  Outpatient   Age/Gender 67year old female MRN [id number]   Location [address]10 [facility] Attending Judi Pitts MD   Hosp Day # 0 PCP Ta

## 2019-07-25 NOTE — PROGRESS NOTES
Dewey Velasquez / [facility]  ECT History & Physical    Rakesh King Patient Status:  Outpatient   Age/Gender 67year old female MRN [id number]   Location [address] [facility] Attending Allison Valentino MD   Hosp Day # 0 PCP Akin Santos PORT-A-CATH N/A 3/3/2017    Performed by Abhinav Nazario MD at [facility] MAIN OR   • CHEMOTHERAPY Left 1998    left breast cancer with mastectomy, chemo and radiation.    • COLONOSCOPY      tublar adenoma   • ECT PROVIDED Bilateral 11/2014    multiple ECT's every 3 the risks and benefits and alternatives with the patient/family. They understand and agree to proceed with plan of care.     Be Neff

## 2019-08-21 ENCOUNTER — HOSPITAL ENCOUNTER (OUTPATIENT)
Dept: POSTOP/PACU | Facility: HOSPITAL | Age: 79
Discharge: HOME | End: 2019-08-21
Attending: Other
Payer: MEDICARE

## 2019-08-21 VITALS
TEMPERATURE: 99 F | RESPIRATION RATE: 19 BRPM | HEART RATE: 77 BPM | SYSTOLIC BLOOD PRESSURE: 167 MMHG | DIASTOLIC BLOOD PRESSURE: 78 MMHG | OXYGEN SATURATION: 92 %

## 2019-08-21 DIAGNOSIS — F31.64 SEVERE BIPOLAR I DISORDER, MOST RECENT EPISODE MIXED, WITH PSYCHOTIC FEATURES (HCC): ICD-10-CM

## 2019-08-21 DIAGNOSIS — F31.2 SEVERE MANIC BIPOLAR I DISORDER WITH PSYCHOTIC FEATURES (HCC): ICD-10-CM

## 2019-08-21 LAB
ANION GAP SERPL CALC-SCNC: 7 MMOL/L (ref 0–18)
BUN BLD-MCNC: 14 MG/DL (ref 7–18)
BUN/CREAT SERPL: 17.7 (ref 10–20)
CALCIUM BLD-MCNC: 9 MG/DL (ref 8.5–10.1)
CHLORIDE SERPL-SCNC: 103 MMOL/L (ref 98–112)
CO2 SERPL-SCNC: 25 MMOL/L (ref 21–32)
CREAT BLD-MCNC: 0.79 MG/DL (ref 0.55–1.02)
GLUCOSE BLD-MCNC: 111 MG/DL (ref 70–99)
GLUCOSE BLD-MCNC: 113 MG/DL (ref 70–99)
GLUCOSE BLD-MCNC: 115 MG/DL (ref 70–99)
OSMOLALITY SERPL CALC.SUM OF ELEC: 281 MOSM/KG (ref 275–295)
POTASSIUM SERPL-SCNC: 4.3 MMOL/L (ref 3.5–5.1)
SODIUM SERPL-SCNC: 135 MMOL/L (ref 136–145)

## 2019-08-21 RX ORDER — GLYCOPYRROLATE 0.2 MG/ML
0.2 INJECTION, SOLUTION INTRAMUSCULAR; INTRAVENOUS ONCE
Status: CANCELLED | OUTPATIENT
Start: 2019-08-21 | End: 2019-08-21

## 2019-08-21 RX ORDER — NALOXONE HYDROCHLORIDE 0.4 MG/ML
80 INJECTION, SOLUTION INTRAMUSCULAR; INTRAVENOUS; SUBCUTANEOUS AS NEEDED
Status: ACTIVE | OUTPATIENT
Start: 2019-08-21 | End: 2019-08-21

## 2019-08-21 RX ORDER — ONDANSETRON 2 MG/ML
4 INJECTION INTRAMUSCULAR; INTRAVENOUS AS NEEDED
Status: ACTIVE | OUTPATIENT
Start: 2019-08-21 | End: 2019-08-21

## 2019-08-21 RX ORDER — SODIUM CHLORIDE, SODIUM LACTATE, POTASSIUM CHLORIDE, CALCIUM CHLORIDE 600; 310; 30; 20 MG/100ML; MG/100ML; MG/100ML; MG/100ML
INJECTION, SOLUTION INTRAVENOUS CONTINUOUS
Status: DISCONTINUED | OUTPATIENT
Start: 2019-08-21 | End: 2019-08-23

## 2019-08-21 RX ORDER — DIPHENHYDRAMINE HYDROCHLORIDE 50 MG/ML
12.5 INJECTION INTRAMUSCULAR; INTRAVENOUS AS NEEDED
Status: ACTIVE | OUTPATIENT
Start: 2019-08-21 | End: 2019-08-21

## 2019-08-21 RX ORDER — ACETAMINOPHEN 500 MG
1000 TABLET ORAL ONCE AS NEEDED
Status: ACTIVE | OUTPATIENT
Start: 2019-08-21 | End: 2019-08-21

## 2019-08-21 RX ORDER — SODIUM CHLORIDE, SODIUM LACTATE, POTASSIUM CHLORIDE, CALCIUM CHLORIDE 600; 310; 30; 20 MG/100ML; MG/100ML; MG/100ML; MG/100ML
INJECTION, SOLUTION INTRAVENOUS CONTINUOUS
Status: CANCELLED | OUTPATIENT
Start: 2019-08-21

## 2019-08-21 RX ORDER — GLYCOPYRROLATE 0.2 MG/ML
0.2 INJECTION, SOLUTION INTRAMUSCULAR; INTRAVENOUS ONCE
Status: COMPLETED | OUTPATIENT
Start: 2019-08-21 | End: 2019-08-21

## 2019-08-21 RX ORDER — MIDAZOLAM HYDROCHLORIDE 1 MG/ML
1 INJECTION INTRAMUSCULAR; INTRAVENOUS EVERY 5 MIN PRN
Status: ACTIVE | OUTPATIENT
Start: 2019-08-21 | End: 2019-08-21

## 2019-08-21 RX ORDER — DEXTROSE MONOHYDRATE 25 G/50ML
50 INJECTION, SOLUTION INTRAVENOUS
Status: DISCONTINUED | OUTPATIENT
Start: 2019-08-21 | End: 2019-08-23

## 2019-08-21 RX ORDER — HYDROMORPHONE HYDROCHLORIDE 1 MG/ML
0.4 INJECTION, SOLUTION INTRAMUSCULAR; INTRAVENOUS; SUBCUTANEOUS EVERY 5 MIN PRN
Status: ACTIVE | OUTPATIENT
Start: 2019-08-21 | End: 2019-08-21

## 2019-08-21 RX ORDER — GLYCOPYRROLATE 0.2 MG/ML
INJECTION, SOLUTION INTRAMUSCULAR; INTRAVENOUS
Status: COMPLETED
Start: 2019-08-21 | End: 2019-08-21

## 2019-08-21 RX ADMIN — GLYCOPYRROLATE 0.2 MG: 0.2 INJECTION, SOLUTION INTRAMUSCULAR; INTRAVENOUS at 06:54:00

## 2019-08-21 NOTE — PROGRESS NOTES
Nga Ortiz / [facility]  ECT History & Physical    Fiona Friends Patient Status:  Outpatient   Age/Gender 67year old female MRN [id number]   Location [address] [facility] Attending Shaheen Lopez MD   Hosp Day # 0 PCP Srini Ivy PORT-A-CATH N/A 3/3/2017    Performed by Juan Antonio Perez MD at [address] [facility] MAIN OR   • CHEMOTHERAPY Left 1998    left breast cancer with mastectomy, chemo and radiation.    • COLONOSCOPY      tublar adenoma   • ECT PROVIDED Bilateral 11/2014    multiple ECT's every 3 benefits and alternatives with the patient/family. They understand and agree to proceed with plan of care.     Onur Suarez

## 2019-08-21 NOTE — PROGRESS NOTES
[facility] / [facility]  ECT Procedure Note    Pako Car Patient Status:  Outpatient   Age/Gender 67year old female MRN [id number]   Location [address] [facility] Attending Preeti Pratt MD   Hosp Day # 0 PCP Ta

## 2019-08-28 RX ORDER — SODIUM CHLORIDE, SODIUM LACTATE, POTASSIUM CHLORIDE, CALCIUM CHLORIDE 600; 310; 30; 20 MG/100ML; MG/100ML; MG/100ML; MG/100ML
INJECTION, SOLUTION INTRAVENOUS CONTINUOUS
Status: CANCELLED | OUTPATIENT
Start: 2019-08-28

## 2019-08-28 RX ORDER — DEXTROSE MONOHYDRATE 25 G/50ML
50 INJECTION, SOLUTION INTRAVENOUS
Status: CANCELLED | OUTPATIENT
Start: 2019-08-28

## 2019-09-11 ENCOUNTER — HOSPITAL ENCOUNTER (OUTPATIENT)
Dept: POSTOP/PACU | Facility: HOSPITAL | Age: 79
Discharge: HOME | End: 2019-09-11
Attending: Other
Payer: MEDICARE

## 2019-09-11 VITALS
HEART RATE: 93 BPM | RESPIRATION RATE: 17 BRPM | OXYGEN SATURATION: 95 % | DIASTOLIC BLOOD PRESSURE: 77 MMHG | BODY MASS INDEX: 34 KG/M2 | HEIGHT: 61 IN | SYSTOLIC BLOOD PRESSURE: 131 MMHG

## 2019-09-11 DIAGNOSIS — F31.64 SEVERE BIPOLAR I DISORDER, MOST RECENT EPISODE MIXED, WITH PSYCHOTIC FEATURES (HCC): ICD-10-CM

## 2019-09-11 LAB — GLUCOSE BLD-MCNC: 199 MG/DL (ref 70–99)

## 2019-09-11 PROCEDURE — 82962 GLUCOSE BLOOD TEST: CPT

## 2019-09-11 RX ORDER — SODIUM CHLORIDE, SODIUM LACTATE, POTASSIUM CHLORIDE, CALCIUM CHLORIDE 600; 310; 30; 20 MG/100ML; MG/100ML; MG/100ML; MG/100ML
INJECTION, SOLUTION INTRAVENOUS CONTINUOUS
Status: DISCONTINUED | OUTPATIENT
Start: 2019-09-11 | End: 2019-09-13

## 2019-09-11 RX ORDER — GLYCOPYRROLATE 0.2 MG/ML
0.2 INJECTION, SOLUTION INTRAMUSCULAR; INTRAVENOUS ONCE
Status: DISCONTINUED | OUTPATIENT
Start: 2019-09-11 | End: 2019-09-11

## 2019-09-11 RX ORDER — GLYCOPYRROLATE 0.2 MG/ML
INJECTION, SOLUTION INTRAMUSCULAR; INTRAVENOUS
Status: COMPLETED
Start: 2019-09-11 | End: 2019-09-11

## 2019-09-11 RX ORDER — GLYCOPYRROLATE 0.2 MG/ML
0.2 INJECTION, SOLUTION INTRAMUSCULAR; INTRAVENOUS ONCE
Status: COMPLETED | OUTPATIENT
Start: 2019-09-11 | End: 2019-09-11

## 2019-09-11 RX ADMIN — GLYCOPYRROLATE 0.2 MG: 0.2 INJECTION, SOLUTION INTRAMUSCULAR; INTRAVENOUS at 06:43:00

## 2019-09-11 RX ADMIN — SODIUM CHLORIDE, SODIUM LACTATE, POTASSIUM CHLORIDE, CALCIUM CHLORIDE: 600; 310; 30; 20 INJECTION, SOLUTION INTRAVENOUS at 06:46:00

## 2019-09-11 NOTE — PROGRESS NOTES
[facility] / [facility]  ECT Procedure Note    Erum Tam Patient Status:  Outpatient   Age/Gender 67year old female MRN [id number]   Location [address] [facility] Attending Glennie Cushing, MD   Hosp Day # 0 PCP Ta

## 2019-09-11 NOTE — PROGRESS NOTES
More delusional.  Mood even.   No physical complaints  Gia Flores / [facility] Patient Status:  Outpatient   Age/Gender 67year old female MRN [id number]   Location [address] [facility] Rene Muñoz MD at [facility] MAIN OR   • Radha Vibyvej 8 N/A 3/3/2017    Performed by Manda Villafana MD at [facility] MAIN OR   • CHEMOTHERAPY Left 1998    left breast cancer with mastectomy, chemo and radiation.    • COLONOSCOPY      tublar adenoma   • ECT PROVI file        Attends meetings of clubs or organizations: Not on file        Relationship status: Not on file      Intimate partner violence:        Fear of current or ex partner: Not on file        Emotionally abused: Not on file        Physically abused: N alternatives with the patient/family. They understand and agree to proceed with plan of care.     Lior Hammond  9/11/2019

## 2019-10-02 ENCOUNTER — HOSPITAL ENCOUNTER (OUTPATIENT)
Dept: POSTOP/PACU | Facility: HOSPITAL | Age: 79
Discharge: HOME | End: 2019-10-02
Attending: Other
Payer: MEDICARE

## 2019-10-02 VITALS
HEART RATE: 87 BPM | OXYGEN SATURATION: 92 % | DIASTOLIC BLOOD PRESSURE: 91 MMHG | RESPIRATION RATE: 21 BRPM | SYSTOLIC BLOOD PRESSURE: 163 MMHG | BODY MASS INDEX: 34 KG/M2 | HEIGHT: 61 IN

## 2019-10-02 DIAGNOSIS — F31.2 SEVERE MANIC BIPOLAR I DISORDER WITH PSYCHOTIC FEATURES (HCC): ICD-10-CM

## 2019-10-02 RX ORDER — GLYCOPYRROLATE 0.2 MG/ML
0.2 INJECTION, SOLUTION INTRAMUSCULAR; INTRAVENOUS ONCE
Status: COMPLETED | OUTPATIENT
Start: 2019-10-02 | End: 2019-10-02

## 2019-10-02 RX ORDER — SODIUM CHLORIDE, SODIUM LACTATE, POTASSIUM CHLORIDE, CALCIUM CHLORIDE 600; 310; 30; 20 MG/100ML; MG/100ML; MG/100ML; MG/100ML
INJECTION, SOLUTION INTRAVENOUS CONTINUOUS
Status: DISCONTINUED | OUTPATIENT
Start: 2019-10-02 | End: 2019-10-04

## 2019-10-02 RX ORDER — DEXTROSE MONOHYDRATE 25 G/50ML
50 INJECTION, SOLUTION INTRAVENOUS
Status: DISCONTINUED | OUTPATIENT
Start: 2019-10-02 | End: 2019-10-04

## 2019-10-02 RX ORDER — HYDRALAZINE HYDROCHLORIDE 20 MG/ML
10 INJECTION INTRAMUSCULAR; INTRAVENOUS ONCE
Status: COMPLETED | OUTPATIENT
Start: 2019-10-02 | End: 2019-10-02

## 2019-10-02 RX ORDER — HYDRALAZINE HYDROCHLORIDE 20 MG/ML
INJECTION INTRAMUSCULAR; INTRAVENOUS
Status: COMPLETED
Start: 2019-10-02 | End: 2019-10-02

## 2019-10-02 RX ORDER — GLYCOPYRROLATE 0.2 MG/ML
INJECTION, SOLUTION INTRAMUSCULAR; INTRAVENOUS
Status: COMPLETED
Start: 2019-10-02 | End: 2019-10-02

## 2019-10-02 RX ADMIN — GLYCOPYRROLATE 0.2 MG: 0.2 INJECTION, SOLUTION INTRAMUSCULAR; INTRAVENOUS at 06:48:00

## 2019-10-02 RX ADMIN — SODIUM CHLORIDE, SODIUM LACTATE, POTASSIUM CHLORIDE, CALCIUM CHLORIDE: 600; 310; 30; 20 INJECTION, SOLUTION INTRAVENOUS at 06:44:00

## 2019-10-02 RX ADMIN — HYDRALAZINE HYDROCHLORIDE 10 MG: 20 INJECTION INTRAMUSCULAR; INTRAVENOUS at 06:45:00

## 2019-10-02 NOTE — PROGRESS NOTES
Milvia Stapleton / [facility]  ECT History & Physical    Pako Car Patient Status:  Outpatient   Age/Gender 67year old female MRN [id number]   Location [address] [facility] Attending Preeti Pratt MD   Hosp Day # 0 PCP Clyde Granados PORT-A-CATH N/A 3/3/2017    Performed by Liam Feliciano MD at [facility] MAIN OR   • CHEMOTHERAPY Left 1998    left breast cancer with mastectomy, chemo and radiation.    • COLONOSCOPY      tublar adenoma   • ECT PROVIDED Bilateral 11/2014    multiple ECT's every 3 patient/family. They understand and agree to proceed with plan of care.     [place] Nab

## 2019-10-23 ENCOUNTER — HOSPITAL ENCOUNTER (OUTPATIENT)
Dept: POSTOP/PACU | Facility: HOSPITAL | Age: 79
Discharge: HOME | End: 2019-10-23
Attending: Other
Payer: MEDICARE

## 2019-10-23 VITALS
HEIGHT: 61 IN | WEIGHT: 183 LBS | RESPIRATION RATE: 24 BRPM | DIASTOLIC BLOOD PRESSURE: 87 MMHG | SYSTOLIC BLOOD PRESSURE: 147 MMHG | BODY MASS INDEX: 34.55 KG/M2 | TEMPERATURE: 98 F | OXYGEN SATURATION: 96 % | HEART RATE: 94 BPM

## 2019-10-23 DIAGNOSIS — F31.64 SEVERE BIPOLAR I DISORDER, MOST RECENT EPISODE MIXED, WITH PSYCHOTIC FEATURES (HCC): ICD-10-CM

## 2019-10-23 DIAGNOSIS — F31.2 SEVERE MANIC BIPOLAR I DISORDER WITH PSYCHOTIC FEATURES (HCC): ICD-10-CM

## 2019-10-23 RX ORDER — NALOXONE HYDROCHLORIDE 0.4 MG/ML
80 INJECTION, SOLUTION INTRAMUSCULAR; INTRAVENOUS; SUBCUTANEOUS AS NEEDED
Status: ACTIVE | OUTPATIENT
Start: 2019-10-23 | End: 2019-10-23

## 2019-10-23 RX ORDER — SODIUM CHLORIDE, SODIUM LACTATE, POTASSIUM CHLORIDE, CALCIUM CHLORIDE 600; 310; 30; 20 MG/100ML; MG/100ML; MG/100ML; MG/100ML
INJECTION, SOLUTION INTRAVENOUS CONTINUOUS
Status: DISCONTINUED | OUTPATIENT
Start: 2019-10-23 | End: 2019-10-25

## 2019-10-23 RX ORDER — MIDAZOLAM HYDROCHLORIDE 1 MG/ML
1 INJECTION INTRAMUSCULAR; INTRAVENOUS EVERY 5 MIN PRN
Status: ACTIVE | OUTPATIENT
Start: 2019-10-23 | End: 2019-10-23

## 2019-10-23 RX ORDER — DIPHENHYDRAMINE HYDROCHLORIDE 50 MG/ML
12.5 INJECTION INTRAMUSCULAR; INTRAVENOUS AS NEEDED
Status: ACTIVE | OUTPATIENT
Start: 2019-10-23 | End: 2019-10-23

## 2019-10-23 RX ORDER — HYDROCODONE BITARTRATE AND ACETAMINOPHEN 5; 325 MG/1; MG/1
2 TABLET ORAL AS NEEDED
Status: DISCONTINUED | OUTPATIENT
Start: 2019-10-23 | End: 2019-10-25

## 2019-10-23 RX ORDER — HYDROCODONE BITARTRATE AND ACETAMINOPHEN 5; 325 MG/1; MG/1
1 TABLET ORAL AS NEEDED
Status: DISCONTINUED | OUTPATIENT
Start: 2019-10-23 | End: 2019-10-25

## 2019-10-23 RX ORDER — GLYCOPYRROLATE 0.2 MG/ML
0.2 INJECTION, SOLUTION INTRAMUSCULAR; INTRAVENOUS ONCE
Status: COMPLETED | OUTPATIENT
Start: 2019-10-23 | End: 2019-10-23

## 2019-10-23 RX ORDER — ONDANSETRON 2 MG/ML
INJECTION INTRAMUSCULAR; INTRAVENOUS
Status: DISCONTINUED
Start: 2019-10-23 | End: 2019-10-23 | Stop reason: WASHOUT

## 2019-10-23 RX ORDER — DEXTROSE MONOHYDRATE 25 G/50ML
50 INJECTION, SOLUTION INTRAVENOUS
Status: DISCONTINUED | OUTPATIENT
Start: 2019-10-23 | End: 2019-10-25

## 2019-10-23 RX ORDER — HYDROMORPHONE HYDROCHLORIDE 1 MG/ML
0.4 INJECTION, SOLUTION INTRAMUSCULAR; INTRAVENOUS; SUBCUTANEOUS EVERY 5 MIN PRN
Status: ACTIVE | OUTPATIENT
Start: 2019-10-23 | End: 2019-10-23

## 2019-10-23 RX ORDER — ONDANSETRON 2 MG/ML
4 INJECTION INTRAMUSCULAR; INTRAVENOUS AS NEEDED
Status: ACTIVE | OUTPATIENT
Start: 2019-10-23 | End: 2019-10-23

## 2019-10-23 RX ORDER — LABETALOL HYDROCHLORIDE 5 MG/ML
5 INJECTION, SOLUTION INTRAVENOUS EVERY 5 MIN PRN
Status: ACTIVE | OUTPATIENT
Start: 2019-10-23 | End: 2019-10-23

## 2019-10-23 RX ORDER — GLYCOPYRROLATE 0.2 MG/ML
INJECTION, SOLUTION INTRAMUSCULAR; INTRAVENOUS
Status: COMPLETED
Start: 2019-10-23 | End: 2019-10-23

## 2019-10-23 RX ORDER — CAFFEINE AND SODIUM BENZOATE 125 MG/ML
250 INJECTION, SOLUTION INTRAMUSCULAR; INTRAVENOUS ONCE
Status: DISCONTINUED | OUTPATIENT
Start: 2019-10-23 | End: 2019-10-23

## 2019-10-23 RX ORDER — DEXAMETHASONE SODIUM PHOSPHATE 4 MG/ML
4 VIAL (ML) INJECTION AS NEEDED
Status: ACTIVE | OUTPATIENT
Start: 2019-10-23 | End: 2019-10-23

## 2019-10-23 RX ADMIN — SODIUM CHLORIDE, SODIUM LACTATE, POTASSIUM CHLORIDE, CALCIUM CHLORIDE: 600; 310; 30; 20 INJECTION, SOLUTION INTRAVENOUS at 06:43:00

## 2019-10-23 RX ADMIN — GLYCOPYRROLATE 0.2 MG: 0.2 INJECTION, SOLUTION INTRAMUSCULAR; INTRAVENOUS at 06:43:00

## 2019-10-23 NOTE — PROGRESS NOTES
[facility] / [facility]  ECT Procedure Note    Josh Phipps Patient Status:  Outpatient   Age/Gender 67year old female MRN [id number]   Location [address] [facility] Attending Tori Man MD   Hosp Day # 0 PCP Ta

## 2019-10-23 NOTE — PROGRESS NOTES
Santiago Heard / [facility]  ECT History & Physical    Alvarado Robertson Patient Status:  Outpatient   Age/Gender 67year old female MRN [id number]   Location [address] [facility] Attending Lore Garner MD   Hosp Day # 0 NORRIS Meredith PORT-A-CATH N/A 3/3/2017    Performed by Wil Almonte MD at [facility] MAIN OR   • CHEMOTHERAPY Left 1998    left breast cancer with mastectomy, chemo and radiation.    • COLONOSCOPY      tublar adenoma   • ECT PROVIDED Bilateral 11/2014    multiple ECT's every 3 with psychotic features. Bifrontal ECT    I have discussed the risks and benefits and alternatives with the patient/family. They understand and agree to proceed with plan of care.     Onur Suarez

## 2019-11-11 RX ORDER — SODIUM CHLORIDE, SODIUM LACTATE, POTASSIUM CHLORIDE, CALCIUM CHLORIDE 600; 310; 30; 20 MG/100ML; MG/100ML; MG/100ML; MG/100ML
INJECTION, SOLUTION INTRAVENOUS CONTINUOUS
Status: CANCELLED | OUTPATIENT
Start: 2019-11-11

## 2019-11-19 ENCOUNTER — ANESTHESIA (OUTPATIENT)
Dept: POSTOP/PACU | Facility: HOSPITAL | Age: 79
End: 2019-11-19
Payer: MEDICARE

## 2019-11-19 ENCOUNTER — HOSPITAL ENCOUNTER (OUTPATIENT)
Dept: POSTOP/PACU | Facility: HOSPITAL | Age: 79
Discharge: HOME | End: 2019-11-19
Attending: Other
Payer: MEDICARE

## 2019-11-19 ENCOUNTER — ANESTHESIA EVENT (OUTPATIENT)
Dept: POSTOP/PACU | Facility: HOSPITAL | Age: 79
End: 2019-11-19
Payer: MEDICARE

## 2019-11-19 VITALS
RESPIRATION RATE: 16 BRPM | HEART RATE: 102 BPM | DIASTOLIC BLOOD PRESSURE: 95 MMHG | OXYGEN SATURATION: 93 % | BODY MASS INDEX: 34 KG/M2 | HEIGHT: 61 IN | SYSTOLIC BLOOD PRESSURE: 170 MMHG | TEMPERATURE: 98 F

## 2019-11-19 DIAGNOSIS — F31.64 BIPOLAR AFFECTIVE DISORDER, MIXED, SEVERE, WITH PSYCHOTIC BEHAVIOR (HCC): ICD-10-CM

## 2019-11-19 DIAGNOSIS — F31.64 SEVERE BIPOLAR I DISORDER, MOST RECENT EPISODE MIXED, WITH PSYCHOTIC FEATURES (HCC): ICD-10-CM

## 2019-11-19 RX ORDER — KETOROLAC TROMETHAMINE 30 MG/ML
15 INJECTION, SOLUTION INTRAMUSCULAR; INTRAVENOUS EVERY 6 HOURS PRN
Status: DISCONTINUED | OUTPATIENT
Start: 2019-11-19 | End: 2019-11-21

## 2019-11-19 RX ORDER — INSULIN ASPART 100 [IU]/ML
INJECTION, SOLUTION INTRAVENOUS; SUBCUTANEOUS ONCE
Status: DISCONTINUED | OUTPATIENT
Start: 2019-11-19 | End: 2019-11-21

## 2019-11-19 RX ORDER — SODIUM CHLORIDE, SODIUM LACTATE, POTASSIUM CHLORIDE, CALCIUM CHLORIDE 600; 310; 30; 20 MG/100ML; MG/100ML; MG/100ML; MG/100ML
INJECTION, SOLUTION INTRAVENOUS CONTINUOUS
Status: DISCONTINUED | OUTPATIENT
Start: 2019-11-19 | End: 2019-11-21

## 2019-11-19 RX ORDER — DEXTROSE MONOHYDRATE 25 G/50ML
50 INJECTION, SOLUTION INTRAVENOUS
Status: DISCONTINUED | OUTPATIENT
Start: 2019-11-19 | End: 2019-11-21

## 2019-11-19 RX ORDER — ACETAMINOPHEN 500 MG
1000 TABLET ORAL ONCE AS NEEDED
Status: ACTIVE | OUTPATIENT
Start: 2019-11-19 | End: 2019-11-19

## 2019-11-19 RX ORDER — ETOMIDATE 2 MG/ML
INJECTION INTRAVENOUS AS NEEDED
Status: DISCONTINUED | OUTPATIENT
Start: 2019-11-19 | End: 2019-11-19 | Stop reason: SURG

## 2019-11-19 RX ORDER — LABETALOL HYDROCHLORIDE 5 MG/ML
5 INJECTION, SOLUTION INTRAVENOUS EVERY 5 MIN PRN
Status: ACTIVE | OUTPATIENT
Start: 2019-11-19 | End: 2019-11-19

## 2019-11-19 RX ORDER — NALOXONE HYDROCHLORIDE 0.4 MG/ML
80 INJECTION, SOLUTION INTRAMUSCULAR; INTRAVENOUS; SUBCUTANEOUS AS NEEDED
Status: ACTIVE | OUTPATIENT
Start: 2019-11-19 | End: 2019-11-19

## 2019-11-19 RX ORDER — GLYCOPYRROLATE 0.2 MG/ML
INJECTION, SOLUTION INTRAMUSCULAR; INTRAVENOUS
Status: COMPLETED
Start: 2019-11-19 | End: 2019-11-19

## 2019-11-19 RX ORDER — ONDANSETRON 2 MG/ML
4 INJECTION INTRAMUSCULAR; INTRAVENOUS AS NEEDED
Status: ACTIVE | OUTPATIENT
Start: 2019-11-19 | End: 2019-11-19

## 2019-11-19 RX ORDER — MIDAZOLAM HYDROCHLORIDE 1 MG/ML
1 INJECTION INTRAMUSCULAR; INTRAVENOUS EVERY 5 MIN PRN
Status: ACTIVE | OUTPATIENT
Start: 2019-11-19 | End: 2019-11-19

## 2019-11-19 RX ORDER — KETOROLAC TROMETHAMINE 30 MG/ML
30 INJECTION, SOLUTION INTRAMUSCULAR; INTRAVENOUS EVERY 6 HOURS PRN
Status: DISCONTINUED | OUTPATIENT
Start: 2019-11-19 | End: 2019-11-21

## 2019-11-19 RX ORDER — HYDROMORPHONE HYDROCHLORIDE 1 MG/ML
0.4 INJECTION, SOLUTION INTRAMUSCULAR; INTRAVENOUS; SUBCUTANEOUS EVERY 5 MIN PRN
Status: ACTIVE | OUTPATIENT
Start: 2019-11-19 | End: 2019-11-19

## 2019-11-19 RX ORDER — GLYCOPYRROLATE 0.2 MG/ML
0.2 INJECTION, SOLUTION INTRAMUSCULAR; INTRAVENOUS ONCE
Status: COMPLETED | OUTPATIENT
Start: 2019-11-19 | End: 2019-11-19

## 2019-11-19 RX ORDER — LABETALOL HYDROCHLORIDE 5 MG/ML
INJECTION, SOLUTION INTRAVENOUS AS NEEDED
Status: DISCONTINUED | OUTPATIENT
Start: 2019-11-19 | End: 2019-11-19 | Stop reason: SURG

## 2019-11-19 RX ADMIN — LABETALOL HYDROCHLORIDE 10 MG: 5 INJECTION, SOLUTION INTRAVENOUS at 08:13:00

## 2019-11-19 RX ADMIN — SODIUM CHLORIDE, SODIUM LACTATE, POTASSIUM CHLORIDE, CALCIUM CHLORIDE: 600; 310; 30; 20 INJECTION, SOLUTION INTRAVENOUS at 08:26:00

## 2019-11-19 RX ADMIN — SODIUM CHLORIDE, SODIUM LACTATE, POTASSIUM CHLORIDE, CALCIUM CHLORIDE: 600; 310; 30; 20 INJECTION, SOLUTION INTRAVENOUS at 08:01:00

## 2019-11-19 RX ADMIN — ETOMIDATE 12 MG: 2 INJECTION INTRAVENOUS at 08:13:00

## 2019-11-19 RX ADMIN — GLYCOPYRROLATE 0.2 MG: 0.2 INJECTION, SOLUTION INTRAMUSCULAR; INTRAVENOUS at 07:07:00

## 2019-11-19 NOTE — PROGRESS NOTES
Marilu Beauchamp / [facility]  ECT History & Physical    Elsa Phoenix Patient Status:  Outpatient   Age/Gender 67year old female MRN [id number]   Location [address] [facility] Attending Estrada Danielle MD   Hosp Day # 0 PCP Jasmyne Soto 3/3/2017    Performed by Gerardo Casiano MD at [facility] MAIN OR   • CHEMOTHERAPY Left 1998    left breast cancer with mastectomy, chemo and radiation.    • COLONOSCOPY      tublar adenoma   • ECT PROVIDED Bilateral 11/2014    multiple ECT's every 3 weeks or so las file        Relationship status: Not on file      Intimate partner violence:        Fear of current or ex partner: Not on file        Emotionally abused: Not on file        Physically abused: Not on file        Forced sexual activity: Not on file    Other proceed with plan of care.     Ranjeet Michaels  11/19/2019

## 2019-11-19 NOTE — ANESTHESIA PREPROCEDURE EVALUATION
PRE-OP EVALUATION    Patient Name: Alvarado Robertson    Pre-op Diagnosis: * No surgery found *    * No surgery found *    * Surgery not found *    Pre-op vitals reviewed.   Temp: 98.4 °F (36.9 °C)  Pulse: 80  Resp: 18  BP: 156/76  SpO2: 92 %  Body mass inde Simple schizophrenia, chronic condition (HCC)     Arm edema     Cancer (HCC)     Hyponatremia     Metabolic acidosis     Hyperkalemia     Gastroparalysis     Gastroenteritis     Altered mental status     Dehydration     Anemia     Schizoaffective disorder Tobacco Use      Smoking status: Former Smoker        Packs/day: 1.00        Years: 25.00        Pack years: 22        Quit date: 3/30/2002        Years since quittin.6      Smokeless tobacco: Never Used    Alcohol use: No      Drug use: No     Availa

## 2019-11-19 NOTE — ANESTHESIA POSTPROCEDURE EVALUATION
[address] [facility] Patient Status:  Outpatient   Age/Gender 67year old female MRN [id number]   Location [address] [facility] Attending Saul Brooks MD   Hosp Day # 0 PCP Dewayne Garcia MD       Anesthesia Post-op Not

## 2019-12-18 ENCOUNTER — ANESTHESIA EVENT (OUTPATIENT)
Dept: POSTOP/PACU | Facility: HOSPITAL | Age: 79
End: 2019-12-18
Payer: MEDICARE

## 2019-12-18 ENCOUNTER — HOSPITAL ENCOUNTER (OUTPATIENT)
Dept: POSTOP/PACU | Facility: HOSPITAL | Age: 79
Discharge: HOME | End: 2019-12-18
Attending: Other
Payer: MEDICARE

## 2019-12-18 ENCOUNTER — ANESTHESIA (OUTPATIENT)
Dept: POSTOP/PACU | Facility: HOSPITAL | Age: 79
End: 2019-12-18
Payer: MEDICARE

## 2019-12-18 VITALS
OXYGEN SATURATION: 93 % | BODY MASS INDEX: 34 KG/M2 | HEART RATE: 83 BPM | HEIGHT: 61 IN | DIASTOLIC BLOOD PRESSURE: 82 MMHG | SYSTOLIC BLOOD PRESSURE: 152 MMHG | RESPIRATION RATE: 18 BRPM | TEMPERATURE: 99 F

## 2019-12-18 DIAGNOSIS — F31.2 SEVERE MANIC BIPOLAR I DISORDER WITH PSYCHOTIC FEATURES (HCC): ICD-10-CM

## 2019-12-18 DIAGNOSIS — F31.64 SEVERE BIPOLAR I DISORDER, MOST RECENT EPISODE MIXED, WITH PSYCHOTIC FEATURES (HCC): ICD-10-CM

## 2019-12-18 RX ORDER — LABETALOL HYDROCHLORIDE 5 MG/ML
INJECTION, SOLUTION INTRAVENOUS AS NEEDED
Status: DISCONTINUED | OUTPATIENT
Start: 2019-12-18 | End: 2019-12-18 | Stop reason: SURG

## 2019-12-18 RX ORDER — HYDROMORPHONE HYDROCHLORIDE 1 MG/ML
0.4 INJECTION, SOLUTION INTRAMUSCULAR; INTRAVENOUS; SUBCUTANEOUS EVERY 5 MIN PRN
Status: CANCELLED | OUTPATIENT
Start: 2019-12-18 | End: 2019-12-18

## 2019-12-18 RX ORDER — GLYCOPYRROLATE 0.2 MG/ML
0.2 INJECTION, SOLUTION INTRAMUSCULAR; INTRAVENOUS ONCE
Status: COMPLETED | OUTPATIENT
Start: 2019-12-18 | End: 2019-12-18

## 2019-12-18 RX ORDER — NALOXONE HYDROCHLORIDE 0.4 MG/ML
80 INJECTION, SOLUTION INTRAMUSCULAR; INTRAVENOUS; SUBCUTANEOUS AS NEEDED
Status: CANCELLED | OUTPATIENT
Start: 2019-12-18 | End: 2019-12-18

## 2019-12-18 RX ORDER — SODIUM CHLORIDE, SODIUM LACTATE, POTASSIUM CHLORIDE, CALCIUM CHLORIDE 600; 310; 30; 20 MG/100ML; MG/100ML; MG/100ML; MG/100ML
INJECTION, SOLUTION INTRAVENOUS CONTINUOUS
Status: DISCONTINUED | OUTPATIENT
Start: 2019-12-18 | End: 2019-12-20

## 2019-12-18 RX ORDER — DEXTROSE MONOHYDRATE 25 G/50ML
50 INJECTION, SOLUTION INTRAVENOUS
Status: CANCELLED | OUTPATIENT
Start: 2019-12-18

## 2019-12-18 RX ORDER — SODIUM CHLORIDE, SODIUM LACTATE, POTASSIUM CHLORIDE, CALCIUM CHLORIDE 600; 310; 30; 20 MG/100ML; MG/100ML; MG/100ML; MG/100ML
INJECTION, SOLUTION INTRAVENOUS CONTINUOUS
Status: CANCELLED | OUTPATIENT
Start: 2019-12-18

## 2019-12-18 RX ORDER — ACETAMINOPHEN 500 MG
1000 TABLET ORAL ONCE AS NEEDED
Status: CANCELLED | OUTPATIENT
Start: 2019-12-18 | End: 2019-12-18

## 2019-12-18 RX ORDER — LABETALOL HYDROCHLORIDE 5 MG/ML
5 INJECTION, SOLUTION INTRAVENOUS EVERY 5 MIN PRN
Status: CANCELLED | OUTPATIENT
Start: 2019-12-18 | End: 2019-12-18

## 2019-12-18 RX ORDER — MIDAZOLAM HYDROCHLORIDE 1 MG/ML
1 INJECTION INTRAMUSCULAR; INTRAVENOUS EVERY 5 MIN PRN
Status: CANCELLED | OUTPATIENT
Start: 2019-12-18 | End: 2019-12-18

## 2019-12-18 RX ORDER — DEXTROSE MONOHYDRATE 25 G/50ML
50 INJECTION, SOLUTION INTRAVENOUS
Status: DISCONTINUED | OUTPATIENT
Start: 2019-12-18 | End: 2019-12-20

## 2019-12-18 RX ORDER — ETOMIDATE 2 MG/ML
INJECTION INTRAVENOUS AS NEEDED
Status: DISCONTINUED | OUTPATIENT
Start: 2019-12-18 | End: 2019-12-18 | Stop reason: SURG

## 2019-12-18 RX ORDER — ONDANSETRON 2 MG/ML
4 INJECTION INTRAMUSCULAR; INTRAVENOUS AS NEEDED
Status: CANCELLED | OUTPATIENT
Start: 2019-12-18 | End: 2019-12-18

## 2019-12-18 RX ORDER — GLYCOPYRROLATE 0.2 MG/ML
INJECTION, SOLUTION INTRAMUSCULAR; INTRAVENOUS
Status: COMPLETED
Start: 2019-12-18 | End: 2019-12-18

## 2019-12-18 RX ADMIN — GLYCOPYRROLATE 0.2 MG: 0.2 INJECTION, SOLUTION INTRAMUSCULAR; INTRAVENOUS at 06:27:00

## 2019-12-18 RX ADMIN — SODIUM CHLORIDE, SODIUM LACTATE, POTASSIUM CHLORIDE, CALCIUM CHLORIDE: 600; 310; 30; 20 INJECTION, SOLUTION INTRAVENOUS at 06:28:00

## 2019-12-18 RX ADMIN — LABETALOL HYDROCHLORIDE 10 MG: 5 INJECTION, SOLUTION INTRAVENOUS at 08:06:00

## 2019-12-18 RX ADMIN — ETOMIDATE 12 MG: 2 INJECTION INTRAVENOUS at 08:08:00

## 2019-12-18 NOTE — PROGRESS NOTES
[facility] / [facility]  ECT Procedure Note    Tiffany Campbell Patient Status:  Outpatient   Age/Gender 67year old female MRN [id number]   Location [address] [facility] Attending Mona Lynne MD   Hosp Day # 0 PCP Ta

## 2019-12-18 NOTE — PROGRESS NOTES
Santiago Heard / [facility]  ECT History & Physical    Alvarado Robertson Patient Status:  Outpatient   Age/Gender 67year old female MRN [id number]   Location [address] [facility] Attending Lore Garner MD   Hosp Day # 0 NORRIS Meredith PORT-A-CATH N/A 3/3/2017    Performed by Emre Corbett MD at [facility] MAIN OR   • CHEMOTHERAPY Left 1998    left breast cancer with mastectomy, chemo and radiation.    • COLONOSCOPY      tublar adenoma   • ECT PROVIDED Bilateral 11/2014    multiple ECT's every 3 risks and benefits and alternatives with the patient/family. They understand and agree to proceed with plan of care.     [facility]

## 2019-12-18 NOTE — ANESTHESIA POSTPROCEDURE EVALUATION
[address] [facility] Patient Status:  Outpatient   Age/Gender 67year old female MRN [id number]   Location [address] [facility] Attending Kaya Armendariz MD   Hosp Day # 0 PCP Julius Abbasi MD       Anesthesia Post-op Not

## 2019-12-18 NOTE — ANESTHESIA PREPROCEDURE EVALUATION
PRE-OP EVALUATION    Patient Name: Shana Saldivar    Pre-op Diagnosis:F31.2    ECT    Whang    Pre-op vitals reviewed. Temp: 98.2 °F (36.8 °C)  Pulse: 64  Resp: 14  BP: 97/66  SpO2: 97 %  Body mass index is 34.01 kg/m². Current medications reviewed. Levothyroxine Sodium 100 MCG Oral Tab, Take 100 mcg by mouth before breakfast., Disp: , Rfl:   Ferrous Sulfate 324 (65 Fe) MG Oral Tab EC, Take 1 tablet by mouth daily. , Disp: , Rfl:   insulin glargine 100 UNIT/ML Subcutaneous Solution, Inject 64 Units i cardiovascular ROS.     Exercise tolerance: good     MET: >4    (+) obesity  (+) hypertension   (+) hyperlipidemia                                  Endo/Other  Comment: hyponatremia    (+) diabetes  type 2,       (+) anemia                   Pulmonary    Ne hyperlipidemia     Hypothyroidism, unspecified type          Past Surgical History:   Procedure Laterality Date   • CATARACT Left 04/2006    ?  IOL   • CATHETER INSERTION PORT-A-CATH Left 3/31/2017    Performed by Phil Callejas MD at [address] for general anesthesia. Patient understands the risks and requirement for anesthesia and agrees to proceed. Consent signed and located in chart.     Sun Olvera MD  Anesthesiologist  Pager [phone]      Present on Admission:  **None**

## 2020-01-08 ENCOUNTER — ANESTHESIA EVENT (OUTPATIENT)
Dept: POSTOP/PACU | Facility: HOSPITAL | Age: 80
End: 2020-01-08
Payer: MEDICARE

## 2020-01-08 ENCOUNTER — ANESTHESIA (OUTPATIENT)
Dept: POSTOP/PACU | Facility: HOSPITAL | Age: 80
End: 2020-01-08
Payer: MEDICARE

## 2020-01-08 ENCOUNTER — HOSPITAL ENCOUNTER (OUTPATIENT)
Dept: POSTOP/PACU | Facility: HOSPITAL | Age: 80
Discharge: HOME | End: 2020-01-08
Attending: Other
Payer: MEDICARE

## 2020-01-08 VITALS
WEIGHT: 165 LBS | HEIGHT: 62 IN | RESPIRATION RATE: 12 BRPM | OXYGEN SATURATION: 94 % | HEART RATE: 78 BPM | DIASTOLIC BLOOD PRESSURE: 87 MMHG | TEMPERATURE: 98 F | SYSTOLIC BLOOD PRESSURE: 159 MMHG | BODY MASS INDEX: 30.36 KG/M2

## 2020-01-08 DIAGNOSIS — F31.2 SEVERE MANIC BIPOLAR I DISORDER WITH PSYCHOTIC FEATURES (HCC): ICD-10-CM

## 2020-01-08 DIAGNOSIS — F31.64 SEVERE BIPOLAR I DISORDER, MOST RECENT EPISODE MIXED, WITH PSYCHOTIC FEATURES (HCC): ICD-10-CM

## 2020-01-08 LAB — GLUCOSE BLD-MCNC: 85 MG/DL (ref 70–99)

## 2020-01-08 RX ORDER — DEXTROSE MONOHYDRATE 25 G/50ML
50 INJECTION, SOLUTION INTRAVENOUS
Status: CANCELLED | OUTPATIENT
Start: 2020-01-08

## 2020-01-08 RX ORDER — SODIUM CHLORIDE, SODIUM LACTATE, POTASSIUM CHLORIDE, CALCIUM CHLORIDE 600; 310; 30; 20 MG/100ML; MG/100ML; MG/100ML; MG/100ML
INJECTION, SOLUTION INTRAVENOUS CONTINUOUS
Status: DISCONTINUED | OUTPATIENT
Start: 2020-01-08 | End: 2020-01-10

## 2020-01-08 RX ORDER — LABETALOL HYDROCHLORIDE 5 MG/ML
5 INJECTION, SOLUTION INTRAVENOUS EVERY 5 MIN PRN
Status: ACTIVE | OUTPATIENT
Start: 2020-01-08 | End: 2020-01-08

## 2020-01-08 RX ORDER — ETOMIDATE 2 MG/ML
INJECTION INTRAVENOUS AS NEEDED
Status: DISCONTINUED | OUTPATIENT
Start: 2020-01-08 | End: 2020-01-08 | Stop reason: SURG

## 2020-01-08 RX ORDER — SODIUM CHLORIDE, SODIUM LACTATE, POTASSIUM CHLORIDE, CALCIUM CHLORIDE 600; 310; 30; 20 MG/100ML; MG/100ML; MG/100ML; MG/100ML
INJECTION, SOLUTION INTRAVENOUS CONTINUOUS
Status: CANCELLED | OUTPATIENT
Start: 2020-01-08

## 2020-01-08 RX ORDER — DEXTROSE MONOHYDRATE 25 G/50ML
50 INJECTION, SOLUTION INTRAVENOUS
Status: DISCONTINUED | OUTPATIENT
Start: 2020-01-08 | End: 2020-01-10

## 2020-01-08 RX ORDER — LABETALOL HYDROCHLORIDE 5 MG/ML
INJECTION, SOLUTION INTRAVENOUS AS NEEDED
Status: DISCONTINUED | OUTPATIENT
Start: 2020-01-08 | End: 2020-01-08 | Stop reason: SURG

## 2020-01-08 RX ORDER — ONDANSETRON 2 MG/ML
4 INJECTION INTRAMUSCULAR; INTRAVENOUS AS NEEDED
Status: ACTIVE | OUTPATIENT
Start: 2020-01-08 | End: 2020-01-08

## 2020-01-08 RX ORDER — GLYCOPYRROLATE 0.2 MG/ML
0.2 INJECTION, SOLUTION INTRAMUSCULAR; INTRAVENOUS ONCE
Status: COMPLETED | OUTPATIENT
Start: 2020-01-08 | End: 2020-01-08

## 2020-01-08 RX ORDER — MIDAZOLAM HYDROCHLORIDE 1 MG/ML
1 INJECTION INTRAMUSCULAR; INTRAVENOUS EVERY 5 MIN PRN
Status: ACTIVE | OUTPATIENT
Start: 2020-01-08 | End: 2020-01-08

## 2020-01-08 RX ORDER — ONDANSETRON 2 MG/ML
4 INJECTION INTRAMUSCULAR; INTRAVENOUS AS NEEDED
Status: CANCELLED | OUTPATIENT
Start: 2020-01-08 | End: 2020-01-08

## 2020-01-08 RX ORDER — NALOXONE HYDROCHLORIDE 0.4 MG/ML
80 INJECTION, SOLUTION INTRAMUSCULAR; INTRAVENOUS; SUBCUTANEOUS AS NEEDED
Status: ACTIVE | OUTPATIENT
Start: 2020-01-08 | End: 2020-01-08

## 2020-01-08 RX ORDER — MIDAZOLAM HYDROCHLORIDE 1 MG/ML
1 INJECTION INTRAMUSCULAR; INTRAVENOUS EVERY 5 MIN PRN
Status: CANCELLED | OUTPATIENT
Start: 2020-01-08 | End: 2020-01-08

## 2020-01-08 RX ORDER — ACETAMINOPHEN 500 MG
1000 TABLET ORAL ONCE AS NEEDED
Status: ACTIVE | OUTPATIENT
Start: 2020-01-08 | End: 2020-01-08

## 2020-01-08 RX ORDER — GLYCOPYRROLATE 0.2 MG/ML
INJECTION, SOLUTION INTRAMUSCULAR; INTRAVENOUS
Status: COMPLETED
Start: 2020-01-08 | End: 2020-01-08

## 2020-01-08 RX ORDER — HYDROMORPHONE HYDROCHLORIDE 1 MG/ML
0.4 INJECTION, SOLUTION INTRAMUSCULAR; INTRAVENOUS; SUBCUTANEOUS EVERY 5 MIN PRN
Status: CANCELLED | OUTPATIENT
Start: 2020-01-08 | End: 2020-01-08

## 2020-01-08 RX ORDER — HYDROMORPHONE HYDROCHLORIDE 1 MG/ML
0.4 INJECTION, SOLUTION INTRAMUSCULAR; INTRAVENOUS; SUBCUTANEOUS EVERY 5 MIN PRN
Status: ACTIVE | OUTPATIENT
Start: 2020-01-08 | End: 2020-01-08

## 2020-01-08 RX ADMIN — GLYCOPYRROLATE 0.2 MG: 0.2 INJECTION, SOLUTION INTRAMUSCULAR; INTRAVENOUS at 06:59:00

## 2020-01-08 RX ADMIN — SODIUM CHLORIDE, SODIUM LACTATE, POTASSIUM CHLORIDE, CALCIUM CHLORIDE 100 ML: 600; 310; 30; 20 INJECTION, SOLUTION INTRAVENOUS at 06:58:00

## 2020-01-08 RX ADMIN — ETOMIDATE 12 MG: 2 INJECTION INTRAVENOUS at 07:36:00

## 2020-01-08 RX ADMIN — LABETALOL HYDROCHLORIDE 10 MG: 5 INJECTION, SOLUTION INTRAVENOUS at 07:32:00

## 2020-01-08 NOTE — ANESTHESIA PREPROCEDURE EVALUATION
PRE-OP EVALUATION    Patient Name: Rhea Campbell    Pre-op Diagnosis: * No surgery found *    * No surgery found *    * Surgery not found *    Pre-op vitals reviewed. Body mass index is 30.18 kg/m². Current medications reviewed.   [facility] Med by mouth nightly. Take with olanzapine 10 mg tablet for a total dose of 17.5 mg/day, Disp: 30 tablet, Rfl: 5  OLANZapine 10 MG Oral Tab, Take 1 tablet (10 mg total) by mouth nightly.  Take with olanzapine 7.5 mg tablet for a total dose of 17.5 mg/day, Disp: KWIKPEN) 100 UNIT/ML Subcutaneous Solution Pen-injector, Inject into the skin 3 (three) times daily before meals.  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      Lab Result

## 2020-01-08 NOTE — PROGRESS NOTES
[facility] / [facility]  ECT History & Physical    Rosiland Spurling Patient Status:  Outpatient   Age/Gender 67year old female MRN [id number]   Location [address] [facility] Attending Jeffery Stacy MD   Hosp Day # 0 PCP Jessica Gastelum PORT-A-CATH N/A 3/3/2017    Performed by Abhinav Nazario MD at [facility] MAIN OR   • CHEMOTHERAPY Left 1998    left breast cancer with mastectomy, chemo and radiation.    • COLONOSCOPY      tublar adenoma   • ECT PROVIDED Bilateral 11/2014    multiple ECT's every 3 patient/family. They understand and agree to proceed with plan of care.     Juan Cox

## 2020-01-08 NOTE — ANESTHESIA POSTPROCEDURE EVALUATION
[address] [facility] Patient Status:  Outpatient   Age/Gender 67year old female MRN [id number]   Location [address] [facility] Attending Nicole Loredo MD   Hosp Day # 0 PCP Shawna Bose MD       Anesthesia Post-op Not

## 2020-01-08 NOTE — PROGRESS NOTES
[facility] / [facility]  ECT Procedure Note    Veleria Flattery Patient Status:  Outpatient   Age/Gender 67year old female MRN [id number]   Location [address] [facility] Attending Artemio Tomlin MD   Hosp Day # 0 PCP Ta

## 2020-01-17 RX ORDER — NICOTINE POLACRILEX 4 MG
15 LOZENGE BUCCAL AS NEEDED
COMMUNITY

## 2020-01-17 RX ORDER — ALOGLIPTIN 25 MG/1
1 TABLET, FILM COATED ORAL DAILY
COMMUNITY

## 2020-01-29 ENCOUNTER — ANESTHESIA (OUTPATIENT)
Dept: POSTOP/PACU | Facility: HOSPITAL | Age: 80
End: 2020-01-29
Payer: MEDICARE

## 2020-01-29 ENCOUNTER — HOSPITAL ENCOUNTER (OUTPATIENT)
Dept: POSTOP/PACU | Facility: HOSPITAL | Age: 80
Discharge: HOME | End: 2020-01-29
Attending: Other
Payer: MEDICARE

## 2020-01-29 ENCOUNTER — ANESTHESIA EVENT (OUTPATIENT)
Dept: POSTOP/PACU | Facility: HOSPITAL | Age: 80
End: 2020-01-29
Payer: MEDICARE

## 2020-01-29 VITALS
TEMPERATURE: 97 F | DIASTOLIC BLOOD PRESSURE: 78 MMHG | HEART RATE: 94 BPM | SYSTOLIC BLOOD PRESSURE: 149 MMHG | HEIGHT: 61 IN | BODY MASS INDEX: 31 KG/M2 | RESPIRATION RATE: 20 BRPM | OXYGEN SATURATION: 92 %

## 2020-01-29 DIAGNOSIS — F31.2 SEVERE MANIC BIPOLAR I DISORDER WITH PSYCHOTIC FEATURES (HCC): ICD-10-CM

## 2020-01-29 DIAGNOSIS — F31.64 SEVERE BIPOLAR I DISORDER, MOST RECENT EPISODE MIXED, WITH PSYCHOTIC FEATURES (HCC): ICD-10-CM

## 2020-01-29 LAB — GLUCOSE BLD-MCNC: 201 MG/DL (ref 70–99)

## 2020-01-29 RX ORDER — GLYCOPYRROLATE 0.2 MG/ML
0.2 INJECTION, SOLUTION INTRAMUSCULAR; INTRAVENOUS ONCE
Status: COMPLETED | OUTPATIENT
Start: 2020-01-29 | End: 2020-01-29

## 2020-01-29 RX ORDER — LABETALOL HYDROCHLORIDE 5 MG/ML
INJECTION, SOLUTION INTRAVENOUS AS NEEDED
Status: DISCONTINUED | OUTPATIENT
Start: 2020-01-29 | End: 2020-01-29 | Stop reason: SURG

## 2020-01-29 RX ORDER — ONDANSETRON 2 MG/ML
4 INJECTION INTRAMUSCULAR; INTRAVENOUS AS NEEDED
Status: ACTIVE | OUTPATIENT
Start: 2020-01-29 | End: 2020-01-29

## 2020-01-29 RX ORDER — DEXTROSE MONOHYDRATE 25 G/50ML
50 INJECTION, SOLUTION INTRAVENOUS
Status: DISCONTINUED | OUTPATIENT
Start: 2020-01-29 | End: 2020-01-31

## 2020-01-29 RX ORDER — GLYCOPYRROLATE 0.2 MG/ML
INJECTION, SOLUTION INTRAMUSCULAR; INTRAVENOUS
Status: COMPLETED
Start: 2020-01-29 | End: 2020-01-29

## 2020-01-29 RX ORDER — SODIUM CHLORIDE, SODIUM LACTATE, POTASSIUM CHLORIDE, CALCIUM CHLORIDE 600; 310; 30; 20 MG/100ML; MG/100ML; MG/100ML; MG/100ML
INJECTION, SOLUTION INTRAVENOUS CONTINUOUS
Status: DISCONTINUED | OUTPATIENT
Start: 2020-01-29 | End: 2020-01-31

## 2020-01-29 RX ORDER — MIDAZOLAM HYDROCHLORIDE 1 MG/ML
1 INJECTION INTRAMUSCULAR; INTRAVENOUS EVERY 5 MIN PRN
Status: ACTIVE | OUTPATIENT
Start: 2020-01-29 | End: 2020-01-29

## 2020-01-29 RX ORDER — HYDROMORPHONE HYDROCHLORIDE 1 MG/ML
0.4 INJECTION, SOLUTION INTRAMUSCULAR; INTRAVENOUS; SUBCUTANEOUS EVERY 5 MIN PRN
Status: ACTIVE | OUTPATIENT
Start: 2020-01-29 | End: 2020-01-29

## 2020-01-29 RX ORDER — ETOMIDATE 2 MG/ML
INJECTION INTRAVENOUS AS NEEDED
Status: DISCONTINUED | OUTPATIENT
Start: 2020-01-29 | End: 2020-01-29 | Stop reason: SURG

## 2020-01-29 RX ADMIN — GLYCOPYRROLATE 0.2 MG: 0.2 INJECTION, SOLUTION INTRAMUSCULAR; INTRAVENOUS at 07:09:00

## 2020-01-29 RX ADMIN — ETOMIDATE 12 MG: 2 INJECTION INTRAVENOUS at 08:21:00

## 2020-01-29 RX ADMIN — SODIUM CHLORIDE, SODIUM LACTATE, POTASSIUM CHLORIDE, CALCIUM CHLORIDE: 600; 310; 30; 20 INJECTION, SOLUTION INTRAVENOUS at 07:08:00

## 2020-01-29 RX ADMIN — LABETALOL HYDROCHLORIDE 10 MG: 5 INJECTION, SOLUTION INTRAVENOUS at 08:21:00

## 2020-01-29 NOTE — ANESTHESIA POSTPROCEDURE EVALUATION
[address] [facility] Patient Status:  Outpatient   Age/Gender 67year old female MRN [id number]   Location [address] [facility] Attending Judi Pitts MD   Hosp Day # 0 PCP Lieutenant Abraham MD       Anesthesia Post-op Not

## 2020-01-29 NOTE — PROGRESS NOTES
[facility] / [facility]  ECT Procedure Note    Tiffany Campbell Patient Status:  Outpatient   Age/Gender 67year old female MRN [id number]   Location [address] [facility] Attending Mona Lynne MD   Hosp Day # 0 PCP Ta

## 2020-01-29 NOTE — PROGRESS NOTES
[facility] / [facility]  ECT History & Physical    Maine Page Patient Status:  Outpatient   Age/Gender 67year old female MRN [id number]   Location [address] [facility] Attending Makenna Bee MD   Hosp Day # 0 PCP Scooby Holden INSERTION PORT-A-CATH N/A 3/3/2017    Performed by Sg Foss MD at [facility] MAIN OR   • CHEMOTHERAPY Left 1998    left breast cancer with mastectomy, chemo and radiation.    • COLONOSCOPY      tublar adenoma   • ECT PROVIDED Bilateral 11/2014    multiple ECT features. Bifrontal ECT    I have discussed the risks and benefits and alternatives with the patient/family. They understand and agree to proceed with plan of care.     Hannah Walker

## 2020-01-29 NOTE — ANESTHESIA PREPROCEDURE EVALUATION
PRE-OP EVALUATION    Patient Name: Dakotah Marti    Pre-op Diagnosis: * No pre-op diagnosis entered *    * No procedures listed *    * No surgeons found in log *    Pre-op vitals reviewed.   Temp: 98.2 °F (36.8 °C)  Pulse: 80  Resp: 18  BP: 153/67  SpO2 Albuterol Sulfate HFA (PROAIR HFA) 108 (90 Base) MCG/ACT Inhalation Aero Soln, Inhale into the lungs every 6 (six) hours as needed for Wheezing., Disp: , Rfl:   guaiFENesin 100 MG/5ML Oral Liquid, Take 200 mg by mouth 4 (four) times daily as needed for c anemia                   Pulmonary    Negative pulmonary ROS.                        Neuro/Psych  Comment: Schizoaffective disorder    (+) depression                        Patient Active Problem List:     Esophageal reflux     Acute venous embolism and thr Gabriel Batres MD at [facility] MAIN OR   • [address] N/A 3/3/2017    Performed by Khloe Villarreal MD at [facility] MAIN OR   • CHEMOTHERAPY Left 1998    left breast cancer with mastectomy, chemo and radiation.    • COLONOSCOPY      tublar adenoma   • ECT PROVI

## 2020-02-05 RX ORDER — SODIUM CHLORIDE, SODIUM LACTATE, POTASSIUM CHLORIDE, CALCIUM CHLORIDE 600; 310; 30; 20 MG/100ML; MG/100ML; MG/100ML; MG/100ML
INJECTION, SOLUTION INTRAVENOUS CONTINUOUS
Status: CANCELLED | OUTPATIENT
Start: 2020-02-05

## 2020-02-19 ENCOUNTER — HOSPITAL ENCOUNTER (OUTPATIENT)
Dept: POSTOP/PACU | Facility: HOSPITAL | Age: 80
Discharge: HOME | End: 2020-02-19
Attending: Other
Payer: MEDICARE

## 2020-02-19 ENCOUNTER — ANESTHESIA (OUTPATIENT)
Dept: POSTOP/PACU | Facility: HOSPITAL | Age: 80
End: 2020-02-19
Payer: MEDICARE

## 2020-02-19 ENCOUNTER — ANESTHESIA EVENT (OUTPATIENT)
Dept: POSTOP/PACU | Facility: HOSPITAL | Age: 80
End: 2020-02-19
Payer: MEDICARE

## 2020-02-19 VITALS
TEMPERATURE: 99 F | HEIGHT: 61 IN | DIASTOLIC BLOOD PRESSURE: 95 MMHG | BODY MASS INDEX: 31 KG/M2 | OXYGEN SATURATION: 95 % | RESPIRATION RATE: 18 BRPM | SYSTOLIC BLOOD PRESSURE: 153 MMHG | HEART RATE: 92 BPM

## 2020-02-19 DIAGNOSIS — F31.64 SEVERE BIPOLAR I DISORDER, MOST RECENT EPISODE MIXED, WITH PSYCHOTIC FEATURES (HCC): ICD-10-CM

## 2020-02-19 DIAGNOSIS — F31.2 SEVERE MANIC BIPOLAR I DISORDER WITH PSYCHOTIC FEATURES (HCC): ICD-10-CM

## 2020-02-19 LAB — GLUCOSE BLD-MCNC: 159 MG/DL (ref 70–99)

## 2020-02-19 RX ORDER — ONDANSETRON 2 MG/ML
4 INJECTION INTRAMUSCULAR; INTRAVENOUS AS NEEDED
Status: ACTIVE | OUTPATIENT
Start: 2020-02-19 | End: 2020-02-19

## 2020-02-19 RX ORDER — HYDROCODONE BITARTRATE AND ACETAMINOPHEN 10; 325 MG/1; MG/1
1 TABLET ORAL AS NEEDED
Status: DISCONTINUED | OUTPATIENT
Start: 2020-02-19 | End: 2020-02-21

## 2020-02-19 RX ORDER — DEXTROSE MONOHYDRATE 25 G/50ML
50 INJECTION, SOLUTION INTRAVENOUS
Status: DISCONTINUED | OUTPATIENT
Start: 2020-02-19 | End: 2020-02-21

## 2020-02-19 RX ORDER — ETOMIDATE 2 MG/ML
INJECTION INTRAVENOUS AS NEEDED
Status: DISCONTINUED | OUTPATIENT
Start: 2020-02-19 | End: 2020-02-19 | Stop reason: SURG

## 2020-02-19 RX ORDER — GLYCOPYRROLATE 0.2 MG/ML
INJECTION, SOLUTION INTRAMUSCULAR; INTRAVENOUS
Status: COMPLETED
Start: 2020-02-19 | End: 2020-02-19

## 2020-02-19 RX ORDER — METOCLOPRAMIDE HYDROCHLORIDE 5 MG/ML
10 INJECTION INTRAMUSCULAR; INTRAVENOUS AS NEEDED
Status: ACTIVE | OUTPATIENT
Start: 2020-02-19 | End: 2020-02-19

## 2020-02-19 RX ORDER — NALOXONE HYDROCHLORIDE 0.4 MG/ML
80 INJECTION, SOLUTION INTRAMUSCULAR; INTRAVENOUS; SUBCUTANEOUS AS NEEDED
Status: ACTIVE | OUTPATIENT
Start: 2020-02-19 | End: 2020-02-19

## 2020-02-19 RX ORDER — SODIUM CHLORIDE, SODIUM LACTATE, POTASSIUM CHLORIDE, CALCIUM CHLORIDE 600; 310; 30; 20 MG/100ML; MG/100ML; MG/100ML; MG/100ML
INJECTION, SOLUTION INTRAVENOUS CONTINUOUS
Status: DISCONTINUED | OUTPATIENT
Start: 2020-02-19 | End: 2020-02-21

## 2020-02-19 RX ORDER — GLYCOPYRROLATE 0.2 MG/ML
0.2 INJECTION, SOLUTION INTRAMUSCULAR; INTRAVENOUS ONCE
Status: COMPLETED | OUTPATIENT
Start: 2020-02-19 | End: 2020-02-19

## 2020-02-19 RX ORDER — LABETALOL HYDROCHLORIDE 5 MG/ML
INJECTION, SOLUTION INTRAVENOUS AS NEEDED
Status: DISCONTINUED | OUTPATIENT
Start: 2020-02-19 | End: 2020-02-19 | Stop reason: SURG

## 2020-02-19 RX ORDER — HYDROMORPHONE HYDROCHLORIDE 1 MG/ML
0.4 INJECTION, SOLUTION INTRAMUSCULAR; INTRAVENOUS; SUBCUTANEOUS EVERY 5 MIN PRN
Status: ACTIVE | OUTPATIENT
Start: 2020-02-19 | End: 2020-02-19

## 2020-02-19 RX ORDER — HYDROCODONE BITARTRATE AND ACETAMINOPHEN 10; 325 MG/1; MG/1
2 TABLET ORAL AS NEEDED
Status: DISCONTINUED | OUTPATIENT
Start: 2020-02-19 | End: 2020-02-21

## 2020-02-19 RX ADMIN — ETOMIDATE 12 MG: 2 INJECTION INTRAVENOUS at 07:45:00

## 2020-02-19 RX ADMIN — SODIUM CHLORIDE, SODIUM LACTATE, POTASSIUM CHLORIDE, CALCIUM CHLORIDE: 600; 310; 30; 20 INJECTION, SOLUTION INTRAVENOUS at 06:45:00

## 2020-02-19 RX ADMIN — GLYCOPYRROLATE 0.2 MG: 0.2 INJECTION, SOLUTION INTRAMUSCULAR; INTRAVENOUS at 06:45:00

## 2020-02-19 RX ADMIN — LABETALOL HYDROCHLORIDE 10 MG: 5 INJECTION, SOLUTION INTRAVENOUS at 07:44:00

## 2020-02-19 NOTE — PROGRESS NOTES
[facility] / [facility]  ECT Procedure Note    Elsa Phoenix Patient Status:  Outpatient   Age/Gender 67year old female MRN [id number]   Location [address] [facility] Attending Estrada Danielle MD   Hosp Day # 0 PCP Ta

## 2020-02-19 NOTE — ANESTHESIA PREPROCEDURE EVALUATION
PRE-OP EVALUATION    Patient Name: Barbara Borrero    Pre-op Diagnosis: * No pre-op diagnosis entered *    * No procedures listed *    * No surgeons found in log *    Pre-op vitals reviewed. There is no height or weight on file to calculate BMI. conditions classified elsewhere with behavioral disturbance     Severe bipolar I disorder, most recent episode mixed, with psychotic features ([facility] [address]     Personal history of pneumonia (recurrent)     DNR (do not resuscitate)     Non compliance with medical t reviewed.                Airway      Mallampati: II  Mouth opening: >3 FB  TM distance: > 6 cm  Neck ROM: full Cardiovascular    Cardiovascular exam normal.         Dental      Dental appliance(s): lower dentures and upper dentures       Pulmonary    Pulmon

## 2020-02-19 NOTE — PROGRESS NOTES
[facility] / [facility]  ECT History & Physical    Maine Page Patient Status:  Outpatient   Age/Gender 67year old female MRN [id number]   Location [address] [facility] Attending Makenna Bee MD   Hosp Day # 0 PCP Scooby Holden PORT-A-CATH N/A 3/3/2017    Performed by Phil Callejas MD at [facility] MAIN OR   • CHEMOTHERAPY Left 1998    left breast cancer with mastectomy, chemo and radiation.    • COLONOSCOPY      tublar adenoma   • ECT PROVIDED Bilateral 11/2014    multiple ECT's every 3 the risks and benefits and alternatives with the patient/family. They understand and agree to proceed with plan of care.     Madiha [facility]

## 2020-02-19 NOTE — ANESTHESIA POSTPROCEDURE EVALUATION
[address] [facility] Patient Status:  Outpatient   Age/Gender 67year old female MRN [id number]   Location [address] [facility] Attending Brooks Ventura MD   Hosp Day # 0 PCP Silvia Duenas MD       Anesthesia Post-op Not

## 2020-03-12 RX ORDER — SODIUM CHLORIDE, SODIUM LACTATE, POTASSIUM CHLORIDE, CALCIUM CHLORIDE 600; 310; 30; 20 MG/100ML; MG/100ML; MG/100ML; MG/100ML
INJECTION, SOLUTION INTRAVENOUS CONTINUOUS
Status: CANCELLED | OUTPATIENT
Start: 2020-03-12

## 2020-03-17 ENCOUNTER — ANESTHESIA EVENT (OUTPATIENT)
Dept: POSTOP/PACU | Facility: HOSPITAL | Age: 80
End: 2020-03-17
Payer: MEDICARE

## 2020-03-18 ENCOUNTER — ANESTHESIA (OUTPATIENT)
Dept: POSTOP/PACU | Facility: HOSPITAL | Age: 80
End: 2020-03-18
Payer: MEDICARE

## 2020-03-18 ENCOUNTER — HOSPITAL ENCOUNTER (OUTPATIENT)
Dept: POSTOP/PACU | Facility: HOSPITAL | Age: 80
Discharge: HOME | End: 2020-03-18
Attending: Other
Payer: MEDICARE

## 2020-03-18 VITALS
HEART RATE: 90 BPM | RESPIRATION RATE: 16 BRPM | HEIGHT: 61 IN | BODY MASS INDEX: 31 KG/M2 | SYSTOLIC BLOOD PRESSURE: 177 MMHG | TEMPERATURE: 99 F | OXYGEN SATURATION: 97 % | DIASTOLIC BLOOD PRESSURE: 70 MMHG

## 2020-03-18 DIAGNOSIS — F31.64 SEVERE BIPOLAR I DISORDER, MOST RECENT EPISODE MIXED, WITH PSYCHOTIC FEATURES (HCC): ICD-10-CM

## 2020-03-18 DIAGNOSIS — F31.2 SEVERE MANIC BIPOLAR I DISORDER WITH PSYCHOTIC FEATURES (HCC): ICD-10-CM

## 2020-03-18 LAB
GLUCOSE BLD-MCNC: 260 MG/DL (ref 70–99)
GLUCOSE BLD-MCNC: 273 MG/DL (ref 70–99)

## 2020-03-18 RX ORDER — GLYCOPYRROLATE 0.2 MG/ML
0.2 INJECTION, SOLUTION INTRAMUSCULAR; INTRAVENOUS ONCE
Status: COMPLETED | OUTPATIENT
Start: 2020-03-18 | End: 2020-03-18

## 2020-03-18 RX ORDER — GLYCOPYRROLATE 0.2 MG/ML
INJECTION, SOLUTION INTRAMUSCULAR; INTRAVENOUS
Status: COMPLETED
Start: 2020-03-18 | End: 2020-03-18

## 2020-03-18 RX ORDER — ETOMIDATE 2 MG/ML
INJECTION INTRAVENOUS AS NEEDED
Status: DISCONTINUED | OUTPATIENT
Start: 2020-03-18 | End: 2020-03-18 | Stop reason: SURG

## 2020-03-18 RX ORDER — SODIUM CHLORIDE, SODIUM LACTATE, POTASSIUM CHLORIDE, CALCIUM CHLORIDE 600; 310; 30; 20 MG/100ML; MG/100ML; MG/100ML; MG/100ML
INJECTION, SOLUTION INTRAVENOUS CONTINUOUS
Status: DISCONTINUED | OUTPATIENT
Start: 2020-03-18 | End: 2020-03-20

## 2020-03-18 RX ORDER — LABETALOL HYDROCHLORIDE 5 MG/ML
INJECTION, SOLUTION INTRAVENOUS AS NEEDED
Status: DISCONTINUED | OUTPATIENT
Start: 2020-03-18 | End: 2020-03-18 | Stop reason: SURG

## 2020-03-18 RX ORDER — NALOXONE HYDROCHLORIDE 0.4 MG/ML
80 INJECTION, SOLUTION INTRAMUSCULAR; INTRAVENOUS; SUBCUTANEOUS AS NEEDED
Status: ACTIVE | OUTPATIENT
Start: 2020-03-18 | End: 2020-03-18

## 2020-03-18 RX ORDER — DEXTROSE MONOHYDRATE 25 G/50ML
50 INJECTION, SOLUTION INTRAVENOUS
Status: DISCONTINUED | OUTPATIENT
Start: 2020-03-18 | End: 2020-03-20

## 2020-03-18 RX ORDER — HYDROMORPHONE HYDROCHLORIDE 1 MG/ML
0.4 INJECTION, SOLUTION INTRAMUSCULAR; INTRAVENOUS; SUBCUTANEOUS EVERY 5 MIN PRN
Status: ACTIVE | OUTPATIENT
Start: 2020-03-18 | End: 2020-03-18

## 2020-03-18 RX ADMIN — SODIUM CHLORIDE, SODIUM LACTATE, POTASSIUM CHLORIDE, CALCIUM CHLORIDE: 600; 310; 30; 20 INJECTION, SOLUTION INTRAVENOUS at 07:04:00

## 2020-03-18 RX ADMIN — ETOMIDATE 12 MG: 2 INJECTION INTRAVENOUS at 07:07:00

## 2020-03-18 RX ADMIN — GLYCOPYRROLATE 0.2 MG: 0.2 INJECTION, SOLUTION INTRAMUSCULAR; INTRAVENOUS at 06:42:00

## 2020-03-18 RX ADMIN — SODIUM CHLORIDE, SODIUM LACTATE, POTASSIUM CHLORIDE, CALCIUM CHLORIDE: 600; 310; 30; 20 INJECTION, SOLUTION INTRAVENOUS at 07:12:00

## 2020-03-18 RX ADMIN — LABETALOL HYDROCHLORIDE 10 MG: 5 INJECTION, SOLUTION INTRAVENOUS at 07:07:00

## 2020-03-18 RX ADMIN — SODIUM CHLORIDE, SODIUM LACTATE, POTASSIUM CHLORIDE, CALCIUM CHLORIDE: 600; 310; 30; 20 INJECTION, SOLUTION INTRAVENOUS at 06:40:00

## 2020-03-18 NOTE — PROGRESS NOTES
[facility] / [facility]  ECT History & Physical    Fernanda Lynch Patient Status:  Outpatient   Age/Gender 67year old female MRN [id number]   Location [address] [facility] Attending Lorena Reynolds MD   Hosp Day # 0 NORRIS Delgado PORT-A-CATH N/A 3/3/2017    Performed by Kal Porter MD at [facility] MAIN OR   • CHEMOTHERAPY Left 1998    left breast cancer with mastectomy, chemo and radiation.    • COLONOSCOPY      tublar adenoma   • ECT PROVIDED Bilateral 11/2014    multiple ECT's every 3 understand and agree to proceed with plan of care.     Doni Cavanaugh

## 2020-03-18 NOTE — PROGRESS NOTES
[facility]  ECT Procedure Note    Jena Ashia Patient Status:  Outpatient   Age/Gender 67year old female MRN [id number]   Location [address] [facility] Attending Ryan Hawkins MD   Hosp Day # 0 PCP Ta

## 2020-03-18 NOTE — ANESTHESIA POSTPROCEDURE EVALUATION
[address] [facility] Patient Status:  Outpatient   Age/Gender 67year old female MRN [id number]   Location [address] [facility] Attending Lakeisha Rodrigues MD   Hosp Day # 0 PCP Adolph Lora MD       Anesthesia Post-op Not

## 2020-03-18 NOTE — ANESTHESIA PREPROCEDURE EVALUATION
PRE-OP EVALUATION    Patient Name: Lelia Cordero    Pre-op Diagnosis:F31.2    ECT    * No surgeons found in log *    Pre-op vitals reviewed. There is no height or weight on file to calculate BMI. Current medications reviewed.   [address] disturbance     Severe bipolar I disorder, most recent episode mixed, with psychotic features ([address]     Personal history of pneumonia (recurrent)     DNR (do not resuscitate)     Non compliance with medical treatment     Lumbar compression fracture ([facility] [address] II  Mouth opening: >3 FB  TM distance: > 6 cm  Neck ROM: full Cardiovascular    Cardiovascular exam normal.         Dental      Dental appliance(s): lower dentures and upper dentures       Pulmonary    Pulmonary exam normal.                 Other findings

## 2020-08-22 NOTE — IP AVS SNAPSHOT
[address]            (For Outpatient Use Only) Initial Admit Date: 9/12/2018   Inpt/Obs Admit Date: Inpt: N/A / Obs: 09/13/18   Discharge Date:    Hospital Acct:  [de-identified]   MRN: [de-identified]   CSN: [id number]        [facility] Account Financial Class: Medicare Advantage    September 13, 2018 yes

## 2021-03-17 NOTE — PROGRESS NOTES
[facility] / [facility]  ECT Procedure Note    Radha Deluna Patient Status:  Outpatient   Age/Gender 67year old female MRN [id number]   Location [address] [facility] Attending Cj Mcguire MD   Hosp Day # 0 PCP Ta Statement Selected

## 2024-06-13 NOTE — PROGRESS NOTES
[facility] / [facility]  ECT History & Physical    Fiona Friends Patient Status:  Outpatient   Age/Gender 69year old female MRN [id number]   Location [address] [facility] Attending Shaheen Lopez MD   Hosp Day # 0 PCP No prim cancer with mastectomy, chemo and                radiation.   No date: COLONOSCOPY      Comment: tublar adenoma  11/2014: ECT PROVIDED Bilateral      Comment: multiple ECT's every 3 weeks or so last 3                years  1998: MASTECTOMY LEFT Left      Co and patient referred for follow-up with her medical services at her [facility]. I have discussed the risks and benefits and alternatives with the patient/family. They understand and agree to proceed with plan of care.     Soledad Jorge No

## (undated) DEVICE — BREAST-HERNIA-PORT CDS-LF: Brand: MEDLINE INDUSTRIES, INC.

## (undated) DEVICE — 3M(TM) TEGADERM(TM) TRANSPARENT FILM DRESSING FRAME STYLE 9505W: Brand: 3M™ TEGADERM™

## (undated) DEVICE — KENDALL SCD EXPRESS SLEEVES, KNEE LENGTH, MEDIUM: Brand: KENDALL SCD

## (undated) DEVICE — SUTURE VICRYL 5-0 PC-1

## (undated) DEVICE — #11 STERILE BLADE: Brand: POLYMER COATED BLADES

## (undated) DEVICE — SUTURE VICRYL 3-0 SH

## (undated) DEVICE — STERILE POLYISOPRENE POWDER-FREE SURGICAL GLOVES: Brand: PROTEXIS

## (undated) DEVICE — SOL  .9 1000ML BTL

## (undated) DEVICE — SUTURE SILK 2-0 FSL

## (undated) DEVICE — DRAPE C-ARM UNIVERSAL

## (undated) NOTE — LETTER
[facility]  Phone: [phone]  Right Fax: [phone]  [address] [place] [address] By: GENESIS Kemp RN Date: 3/1/17    Patient Name: Neeraj Nielsen  Surgery Date: 3/3/2017    CSN: [id number]  Medical Record:

## (undated) NOTE — IP AVS SNAPSHOT
Patient Demographics     Address  [address] [address] Phone  [phone] (Home) *Preferred*  [phone] [facility]) E-mail Address  [email or web address]      Emergency Contact(s)     Name Relation Home Work Mobile    Leticia Colon Take 1 tablet by mouth daily. HUMALOG KWIKPEN 100 UNIT/ML Sopn  Generic drug:  Insulin Lispro      Inject into the skin 3 (three) times daily before meals.  DM<150=0 units -200=2 units; -250= 4 units; -300=6 units;  -350= 8 Take 100 mg by mouth daily. sodium chloride 1 g Tabs      Take 1 g by mouth 3 (three) times daily with meals. Indications: Electrolyte Disturbance          Vitamin D3 83204 units Tabs      Take 1 tablet by mouth once a week.                    [address] [id number] Insulin Aspart Pen (NOVOLOG) 100 UNIT/ML flexpen 2-10 Units 09/13/18 1238 Given            RIGHT LOWER ABDOMEN     Order ID Medication Name Action Time Action Reason Comments    [id number] insulin aspart (NOVOLOG) 100 UNIT/ML vial 8 Units 09/12/1 Ordering provider:  Pau Arevalo MD  09/13/18 5481 Resulting lab:  [facility]    Specimen Information    Type Source Collected On   Blood — 09/13/18 0623          Components    Component Value Reference Range Flag Lab   Glucose 201 70 - 99 mg/dL H Edw Specimen Information    Type Source Collected On   Urine — 09/12/18 1919          Components    Component Value Reference Range Flag Lab   Urine Color Yellow Yellow — [facility]   Clarity Urine Clear Clear — [facility]   Spec Gravity 1.008 1.001 - 1.030 — Calcium, Total 8.1 8.3 - 10.3 mg/dL  [facility]   Calculated Osmolality 267 275 - 295 mOsm/kg  [facility]   GFR, Non- 33 >=60  [facility]   GFR, -American 38 >=60  [employer]   Comment:           Estimated GFR units: mL/min/1.73 squa Ordering provider:  Cheo Damon MD  09/12/18 1446 Resulting lab:  [facility]    Specimen Information    Type Source Collected On   Blood — 09/12/18 1548          Components    Component Value Reference Range Flag Lab   Glucose 221 70 - 99 mg/dL H Edw Please view results for these tests on the individual orders.     Specimen Information    Type Source Collected On   Blood — 09/12/18 7911            Testing Performed By     Lab - Abbreviation Name Director Address Valid Date Range    [address] [facility] EDWA left breast cancer with mastectomy, chemo and radiation. • Cancer ([facility] [address] 1998    left breast cancer in 1998 with chemo and radiation.    • Deep vein thrombosis (HCC)     due to chemo had a few blood clots   • Dementia    • Depression    • Diabetic neuropa She reports that she does not drink alcohol or use drugs.     Family History:   Family History   Problem Relation Age of Onset   • Diabetes Mother    • Cancer Sister    • Breast Cancer Sister 48   • Breast Cancer Self 63        left breast   • Diabetes Son pregabalin (LYRICA) 75 MG Oral Cap Take 75 mg by mouth 2 (two) times daily. Disp:  Rfl:    HydrALAZINE HCl 100 MG Oral Tab Take 100 mg by mouth 2 (two) times daily.  Disp:  Rfl:    ipratropium-albuterol 0.5-2.5 (3) MG/3ML Inhalation Solution Take 3 mL by ne Psychiatric: Appropriate mood and affect.       Diagnostic Data:      Labs:  Recent Labs   Lab  09/12/18   1548   WBC  6.5   HGB  11.3*   MCV  86.4   PLT  206.0       Recent Labs   Lab  09/12/18   1548  09/12/18   1752  09/12/18   1905   GLU  221*  119*   - H&P signed by Karuna Giron MD at 9/12/2018 11:44 PM  Version 1 of 2    Author:  Karuna Giron MD Service:  Kirk Tran Author Type:  Physician    Filed:  9/12/2018 11:44 PM Date of Service:  9/12/2018 11:29 PM Status:  Signed    :  Radha Figueroa • High cholesterol    • Hypothyroidism    • Low sodium levels 10/2014   • Obesity, unspecified    • Other and unspecified hyperlipidemia    • Personal history of antineoplastic chemotherapy     OVER 20 YRS AGO   • Personal history of irradiation, presentin Medications:    No current facility-administered medications on file prior to encounter. Current Outpatient Medications on File Prior to Encounter:  OLANZapine 7.5 MG Oral Tab Take 7.5 mg by mouth nightly.  Take with olanzapine 10 mg tablet for a total do sodium chloride 1 G Oral Tab Take 1 g by mouth 3 (three) times daily with meals. Indications: Electrolyte Disturbance Disp:  Rfl:    acetaminophen (TYLENOL) 325 MG Oral Tab Take 650 mg by mouth every 6 (six) hours as needed for Pain or Fever.  Indications: AST  32   --    --    ALT  29   --    --    BILT  0.3   --    --    TP  7.4   --    --        Estimated Creatinine Clearance: 23.4 mL/min (A) (based on SCr of 1.52 mg/dL (H)). No results for input(s): PTP, INR in the last 168 hours.     No results for in Location [facility] 4NW-A Attending Kartik Gerard MD   Hosp Day # 0 PCP Chelsie Garcia MD     Reason for Consultation:  Uncontrolled hyperglycemia     History of Present Illness:  69year old female who presents for uncontrolled hyperglycemia     She • Personal history of irradiation, presenting hazards to health    • Personal history of pneumonia (recurrent)    • Pneumonia, organism unspecified(486)    • Psychiatric disorder    • Schizophrenia ([facility]    • Type 2 diabetes mellitus ([facility]    • Type II or •  insulin detemir (LEVEMIR) 100 UNIT/ML flextouch 64 Units, 64 Units, Subcutaneous, Daily  •  Levothyroxine Sodium (SYNTHROID) tab 100 mcg, 100 mcg, Oral, Before breakfast  •  Metoprolol Succinate ER (Toprol XL) 24 hr tab 25 mg, 25 mg, Oral, Daily Beta Bl Neck: supple, no thyromegaly or nodules palpable  Lymph: no neck or supraclavicular lymphadenopathy  Cardiovascular:  RRR; no murmurs   Lungs: CTAB, no wheezes or rales  Abd: soft, nontender, nondistended, active bowel sounds present  MSK: no clubbing, cya Dementia in conditions classified elsewhere with behavioral disturbance     Bipolar I disorder, most recent episode (or current) depressed, severe, specified as with psychotic behavior ([facility] [address]     Severe bipolar I disorder, most recent episode mixed, with - plan on returning to NH today with same insulin doses that have been working here and daughter will talk to [facility] staff about insulin administration, patient's dietary habits, nursing supervision.   She says that her \"regular nurse\" has been off for 4 days 2/6/2019 12:00 PM Eliud Salmon MD [address] [facility]

## (undated) NOTE — IP AVS SNAPSHOT
[facility] [place] [address] [place], [address] ~ [phone]                Discharge Summary   6/7/2017    Pako Car           Admission Information        Provider Department    6/7/2017 Elijah Walton MD  Gali Gonzales [    ]    [    ]       HydrALAZINE HCl 100 MG Tabs   Commonly known as:  APRESOLINE        Take 100 mg by mouth 2 (two) times daily.       [    ]    [    ]    [    ]    [    ]       insulin glargine 100 UNIT/ML Soln   Commonly known as:  LANTUS        Inje Take 4 mg by mouth every 8 (eight) hours as needed for Nausea. [    ]    [    ]    [    ]    [    ]       Pravastatin Sodium 40 MG Tabs   Commonly known as:  PRAVACHOL        Take 40 mg by mouth nightly.  Indications: Type II B Hyperlipidemia      [ · If you have any questions or concerns, please call your own psychiatrist.    Expected Recovery:  · As you awaken, you may experience one or more of the following:  Headache, nausea, temporary confusion, or muscle stiffness.   If these symptoms increase, b Medicaid plans. To get signed up and covered, please call [phone] and ask to get set up for an insurance coverage that is in-network with Ronit Gen[employer].         Visit Information        Provider Department Dept Phone    6/7/2017  5:30 AM PA

## (undated) NOTE — MR AVS SNAPSHOT
After Visit Summary   1/4/2017    Dakotah Marti    MRN: [id number]           Visit Information        Provider Department Dept Phone    1/4/2017  5:30 AM PACU ROOM  [facility] [phone]      Your Vitals Were     BP Pulse Resp Ht SpO2       121/63 Sertraline HCl 100 MG Oral Tab Take 100 mg by mouth daily. Benzocaine-Menthol 4-9 MG Mouth/Throat Lozenge Use as directed in the mouth or throat as needed.     DiphenhydrAMINE HCl 25 MG Oral Cap Take 25 mg by mouth every 6 (six) hours as needed for Itch 5 - 17) patients. Video Visits are available Monday - Friday for many common conditions such as allergies, colds, cough, fever, rash, sore throat, headache and pink eye.   The cost for a Video Visit is currently $10.         If you receive a survey from [employer]

## (undated) NOTE — LETTER
Consent to Procedure/Sedation    Date: 03/31/2017  Time: 7964    1. I authorize the performance upon Angel Grigsby the following:    Port a catheter check    2.  I authorize Dr. Jayant Huffman (and whomever is designated as the doctor’s assistant), to perform t Witness: ____________________     Date: ______________    Printed: 3/31/2017   4:54 PM    Patient Name: Fiona Dewey        : 1940       Medical Record #: [id number]

## (undated) NOTE — IP AVS SNAPSHOT
[facility] [place] [address] [place], [address] ~ [phone]                Dis[facility] Summary   [facility]2017    [facility]           Admission Information        Provider Department    1/4/2017 Jillian Matos MD  Pac[facility]      Aller Inject 20 Units into the skin 2 (two) times daily.  44 units in the am and 20 units at [facility]    Richi Abreu     [    ]    [    ]    [    ]    [    ]       ipratropium-albuterol 0.5-2.5 (3) MG/3ML Soln   Commonly known as:  DUONEB        Take 3 mL by nebuliza responsible adult stay with you the rest of the day and overnight. · Do not drive today. · Do not operate any machinery today. Use kitchen equipment with caution. · Rest and take it easy today.   · Do not take public transportation without the presence can help with your Affordable Care Act coverage, as well as all types of Medicaid plans. To get signed up and covered, please call [phone] and ask to get set up for an insurance coverage that is in-network with Ronit Genao.         Visit

## (undated) NOTE — MR AVS SNAPSHOT
After Visit Summary   2/1/2017    Rakesh King    MRN: [id number]           Visit Information        Provider Department Dept Phone    2/1/2017  5:30 AM PACU ROOM  [facility] [phone]      Your Vitals Were     BP Pulse Temp(Src) Resp Ht SpO2 OLANZapine (ZYPREXA) 20 MG Oral Tab Take 1 tablet (20 mg total) by mouth nightly. MetFORMIN HCl (GLUCOPHAGE) 1000 MG Oral Tab Take 1,000 mg by mouth 2 (two) times daily with meals. torsemide (DEMADEX) 5 MG Oral Tab Take 1 tablet by mouth daily.  Viviana If you receive a survey from [employer], please take a few minutes to complete it and provide feedback. We strive to deliver the best patient experience and are looking for ways to make improvements. Your feedback will help us do so.  For more information

## (undated) NOTE — MR AVS SNAPSHOT
After Visit Summary   4/26/2017    Kelby Peabody    MRN: [id number]           Visit Information        Provider Department Dept Phone    4/26/2017  5:30 AM PACU ROOM  [facility] [phone]      Your Vitals Were     BP Pulse Temp(Src) Resp Ht SpO2 daily.    Melatonin 5 MG Oral Tab Take by mouth nightly. HydrALAZINE HCl 100 MG Oral Tab Take 100 mg by mouth 2 (two) times daily. ipratropium-albuterol 0.5-2.5 (3) MG/3ML Inhalation Solution Take 3 mL by nebulization every 6 (six) hours as needed. NOTIFY PHYSICIAN (SPECIFY) [id number] CUSTOM]     NOTIFY PHYSICIAN (SPECIFY) [[id number] CUSTOM]     NPO [[id number] CUSTOM]     NURSING COMMUNICATION [[id number] CUSTOM]     PLACE PIV [[id number] CUSTOM]     POCT GLUCOSE [[id number] CUSTOM]     PULSE OXIMETRY [[id number] RGLLAW]

## (undated) NOTE — IP AVS SNAPSHOT
[facility] [place] [address], [address] ~ [phone]                Discharge Summary   5/10/2017    Fiona Dewey           Admission Information        Provider Department    5/10/2017 Gonzalo Malcolm MD  Sydney Nguyen Take 25 mg by mouth every 4 (four) hours as needed for Itching. [    ]    [    ]    [    ]    [    ]       Ferrous Sulfate 324 (65 Fe) MG Tbec        Take 1 tablet by mouth daily.       [    ]    [    ]    [    ]    [    ]       Moraima Perez [facility] Metoprolol Succinate ER 25 MG Tb24   Commonly known as: Toprol XL        Take 25 mg by mouth daily. [    ]    [    ]    [    ]    [    ]       OLANZapine 20 MG Tabs   Commonly known as:  ZYPREXA        Take 1 tablet (20 mg total) by mouth nightly. Recent Hematology Lab Results  (Last 3 results in the past 90 days)    WBC RBC Hemoglobin Hematocrit MCV MCH MCHC RDW Platelet MPV    (85/14/16)  10.2 (03/04/17)  3.79 (L) (03/04/17)  9.8 (L) (03/04/17)  31.8 (L) (03/04/17)  83.9 -- -- -- (03/05/17)  208. 0 Medicaid plans. To get signed up and covered, please call [phone] and ask to get set up for an insurance coverage that is in-network with Ronit Genao.         MyChart     Visit [employer]  You can access your MyChart to more actively manage Pravastatin Sodium (PRAVACHOL) 40 MG Oral Tab       Use: Lower cholesterol, protect your heart   Most common side effects: Dizziness, constipation, abnormal liver function   What to report to your healthcare team:  Dizziness, muscle aches, constipation ipratropium-albuterol 0.5-2.5 (3) MG/3ML Inhalation Solution       Use:  Treatment of asthma, COPD/emphysema, cough, allergies   Most common side effects: Headache, nasal irritation, palpitations, increased heart rate, increased blood pressure, allergic r sodium chloride 1 G Oral Tab

## (undated) NOTE — LETTER
[address] [place] Testing Department  Phone: [phone]  OUTSIDE TESTING RESULT REQUEST      TO:   Enrrique Edmond Today's Date: 6/5/19    FAX #: [phone]     IMPORTANT: FOR YOUR IMMEDIATE ATTENTION  Please FAX all test results listed bel

## (undated) NOTE — IP AVS SNAPSHOT
[facility] [place] [address] [place] ~ [phone]                Discharge Summary   2/1/2017    Dakotah Marti           Admission Information        Provider Department    2/1/2017 Marilia Silva MD  Pac[facility]      Aller Inject 20 Units into the skin 2 (two) times daily.  44 units in the am and 20 units at [facility]    Karina Aragon     [    ]    [    ]    [    ]    [    ]       ipratropium-albuterol 0.5-2.5 (3) MG/3ML Soln   Commonly known as:  DUONEB        Take 3 mL by nebuliza responsible adult stay with you the rest of the day and overnight. · Do not drive today. · Do not operate any machinery today. Use kitchen equipment with caution. · Rest and take it easy today.   · Do not take public transportation without the presence Why:  Call your Psychiatrist for your follow up appt.       Future Appointments        Provider Department    2/13/2017 11:00 AM Mia Ontiveros ENDOCRINOLOGY - [facility] PATRICIA [facility]      Immunization History as of 2/1/2017  Never Reviewed    No immunizati Call [phone] for help. [employer] is NOT to be used for urgent needs. For medical emergencies, dial 911.

## (undated) NOTE — IP AVS SNAPSHOT
[facility] [place] [address] [place], [address] ~ [phone]                Discharge Summary   3/3/2017    Brian Live           Admission Information        Provider Department    3/3/2017 DO Chester Gloria 3nw-A         T acetaminophen 325 MG Tabs   Commonly known as:  TYLENOL        Take 650 mg by mouth every 6 (six) hours as needed for Pain or Fever. Indications: Fever, Pain                            ALLEGRA OR   Next dose due: Tomorrow         60 mg daily. Take 75 mcg by mouth before breakfast.                            Loperamide HCl 2 MG Caps   Commonly known as:  IMODIUM        Take 2 mg by mouth as needed for Diarrhea.                             LYRICA 75 MG Caps   Last time this was given:  75 mg on 3 Take 1 g by mouth 3 (three) times daily with meals.  Indications: Electrolyte Disturbance                                       Where to Get Your Medications      Please  your prescriptions at the location directed by your doctor or nurse     Bring Office contact name/phone:      Primary Insurance Name:  [address] #:      Policy #:  [id number]    Authorization #:      The following risks/benefits of the procedural was discussed with the Patient or Legal Representative: Radiology Exams     None         Additional Information       We are concerned for your overall well being:    - If you are a smoker or have smoked in the last 12 months, we encourage you to explore options for quitting.     - If you have concerns related experience these side effects or respond to medications the same. Please call your provider or healthcare team if you have any questions regarding your medications while at home.          Blood Pressure and Cardiac Medications     Metoprolol Succinate ER 25 What to report to your healthcare team:  Low blood sugar (less than 70) twice a week or high blood sugar (greater than 200) for more than 2-3 days           Non-Narcotic Pain Medications     acetaminophen (TYLENOL) 325 MG Oral Tab       Use: Treat pain, fe What to report to your healthcare team:  Changes in thinking, confusion, skin swelling, palpitations, dizziness           General Nerve Function Medications     pregabalin (LYRICA) 75 MG Oral Cap       Use:  Treat conditions such as seizures, headaches, de

## (undated) NOTE — IP AVS SNAPSHOT
[facility] [place] [address] [place], [address] ~ [phone]                Discharge Summary   3/31/2017    Rakesh King           Admission Information        Department    3/31/2017  Pre-Op / Ascc      Surgery Information [    ]    [    ]       guaiFENesin 100 MG/5ML Syrp   Commonly known as:  ROBITUSSIN        Take 200 mg by mouth 4 (four) times daily as needed for cough.       [    ]    [    ]    [    ]    [    ]       Juan Melissa 100 UNIT/ML Sopn   Generic drug:  Ins Metoprolol Succinate ER 25 MG Tb24   Commonly known as: Toprol XL        Take 25 mg by mouth daily. [    ]    [    ]    [    ]    [    ]       OLANZapine 20 MG Tabs   Commonly known as:  ZYPREXA        Take 1 tablet (20 mg total) by mouth nightly. · Pain not relieved with pain medication    For life threatening emergencies (unexpected chest pain, difficulty breathing) call 911.     ·     Instructions and Information about Your Health     None      Follow-up Information     Follow up with Stefanie Barcenas Radiology Exams     None         Additional Information       We are concerned for your overall well being:    - If you are a smoker or have smoked in the last 12 months, we encourage you to explore options for quitting.     - If you have concerns related

## (undated) NOTE — IP AVS SNAPSHOT
[address]            (For Outpatient Use Only) Initial Admit Date: 6/27/2018   Inpt/Obs Admit Date: Inpt: N/A / Obs: 06/27/18   Discharge Date:    Hospital Acct:  [de-identified]   MRN: [de-identified]   CSN: [id number]        [id number]  [id number] Subscriber ID:  Pt Rel to Subscriber:    Hospital Account Financial Class: Medicare    June 28, 2018

## (undated) NOTE — IP AVS SNAPSHOT
[facility] [place] [address] [place], [address] ~ [phone]                Discharge Summary   3/29/2017    Luis Miguel Antonio           Admission Information        Provider Department    3/29/2017 Hannah Walker MD  Pacu      All Take 200 mg by mouth 4 (four) times daily as needed for cough. [    ]    [    ]    [    ]    [    ]       Mery [place] 100 UNIT/ML Sopn   Generic drug:  Insulin Lispro        Inject into the skin 3 (three) times daily before meals.  DM<150=0 units [    ]    [    ]    [    ]       OLANZapine 20 MG Tabs   Commonly known as:  ZYPREXA        Take 1 tablet (20 mg total) by mouth nightly.     Imtiaz Edge     [    ]    [    ]    [    ]    [    ]       omeprazole 20 MG Cpdr   Commonly known as:  Sharonda Peterson appointment or to check for appointments times/dates already scheduled. *If you or your family members have any questions about your Outpatient ECT, they are encouraged to contact the nurse at [phone].  If she is unavailable, please leave a message Immunization History as of 3/29/2017  Never Reviewed    No immunizations on file.       Recent Hematology Lab Results  (Last 3 results in the past 90 days)    WBC RBC Hemoglobin Hematocrit MCV MCH MCHC RDW Platelet MPV    (78/55/65)  10.2 (03/04/17)  3.79 ( can help with your Affordable Care Act coverage, as well as all types of Medicaid plans. To get signed up and covered, please call [phone] and ask to get set up for an insurance coverage that is in-network with [facility] Gneao.         Visit

## (undated) NOTE — IP AVS SNAPSHOT
Patient Demographics     Address Phone E-mail Address    [address] [phone] (Home) *Preferred*  [phone] [facility]) [email or web address]      Emergency Contact(s)     Name Relation Home Work Mobile    Vasq Take 1 tablet (40 mg total) by mouth daily.    Stop taking on:  4/5/2017    Madeleine Patton                           HUMALOG KWIKPEN 100 UNIT/ML Sopn   Generic drug:  Insulin Lispro   Next dose due:  Dinner time         Inject into the skin 3 (three) times Metoprolol Succinate ER 25 MG Tb24   Last time this was given:  25 mg on 3/6/2017  6:29 AM   Commonly known as: Toprol XL   Next dose due: Tomorrow         Take 25 mg by mouth daily.                             OLANZapine 20 MG Tabs [id number] OLANZapine (ZYPREXA) tab 20 mg 03/05/17 2151 Given      [id number] Sertraline HCl (ZOLOFT) tab 100 mg 03/06/17 0827 Given      [id number] ferrous sulfate EC tab 325 mg 03/06/17 0827 Given      [id number] hydrALAzine HCl (APRESOLINE) tab 100 mg 03/0 Temp (!) 97.5 °F (36.4 °C) Filed at 03/06/2017 0825    SpO2 96 % Filed at 03/06/2017 0825      Patient's Most Recent Weight       Most Recent Value    Patient Weight 78.563 kg (173 lb 3.2 oz)         Lab Results Last 24 Hours (03/06/17 - 03/05/17)      [facility] Lab - Abbreviation Name Director Address Valid Date Range    MARYELLEN/ Jani Marrero [address] [address] 02/03/16 1201 - Present            Microbiology Results (All)     Procedure Component Value Units D port placed in abdominal wall. She states that she had a couple of ports placed in the past and the last one was taken out about 10 years ago.     Past Medical History:  Past Medical History   Diagnosis Date   • Encounter for antineoplastic chemotherapy smokeless tobacco. She reports that she does not drink alcohol or use illicit drugs.     Family History:   Family History   Problem Relation Age of Onset   • Diabetes Mother    • Cancer Sister    • Breast Cancer Sister 48   • Diabetes Son    • Diabetes Sarah Valiente mouth or throat as needed. Disp:  Rfl:    acetaminophen (TYLENOL) 325 MG Oral Tab Take 650 mg by mouth every 6 (six) hours as needed for Pain or Fever.  Indications: Fever, Pain Disp:  Rfl:        Review of Systems:   A comprehensive 14 point review of syst room air at rest and also monthly ECT therapy for depression bipolar disorder schizoaffective. Patient is stable and she has no complaints at present time. Discussed with hematology and cardiology.   She is not a candidate for any interventions at present nursing home. She has a history of schizoaffective/bipolar disorder and has been undergoing ECT treatments all her life. She also has a history of left breast cancer for which she received mastectomy, radiation and chemotherapy.   Patient is a poor histor Comment left    COLONOSCOPY      Comment tublar adenoma    ECT PROVIDED Bilateral 11/2014    Comment ! st Tx. 11/2014    CHEMOTHERAPY Left 1998    Comment left breast cancer with mastectomy, chemo and radiation.     RADIATION LEFT Left 1998    Comment left Abdomen: Soft, nontender, nondistended. Positive bowel sounds. No rebound tenderness  Neurologic: No focal neurological deficits.       Laboratory Data:      Lab Results  Component Value Date   WBC 10.2 03/04/2017   HGB 9.8 03/04/2017   HCT 31.8 03/04/2017 collaterals within the right chest and upper abdomen which represent collateral flow. There may be smaller venous collaterals within the posterior anterior mediastinum.    THORACIC AORTA:  Scattered atherosclerosis.   MEDIASTINUM/JACQUES:  There is a 1.8 x 1.5 # Anemia: Likely multifactorial due to iron deficiency, renal insufficiency as well as anemia of chronic disease. Will start oral iron. Thank you for allowing me to participate in the etgan Shannon. I will continue to follow along.       Eloy Adler Past Medical History  Past Medical History   Diagnosis Date   • Encounter for antineoplastic chemotherapy    • Unspecified vascular insufficiency of intestine    • Personal history of pneumonia (recurrent)    • Personal history of irradiation, presenting Patient Regularly Uses: None    Prior Level of [place]: Pt reports that she typically dresses herself and staff is SBA when showering; she does not utilize any AD for amb, however feels she would benefit from using a SC; staff is around and able to as How much help from another person does the patient currently need. ..   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    In this PT evaluation, the patient presents with the following impairme 5/31/2017 12:30 PM MD Ferny Lopes [facility] [place]    8/16/2017 10:30 AM Leah Ludwig MD DM ENDOCRINOLOGY - [facility], [facility]

## (undated) NOTE — LETTER
[facility] Testing Department  Phone: [phone]  OUTSIDE TESTING RESULT REQUEST      TO:   Enrrique Edmond                Today's Date: 1/7/20    FAX #:[phone]     IMPORTANT: FOR YOUR IMMEDIATE ATTENTION  Please FAX all test resu

## (undated) NOTE — LETTER
[facility] Testing Department  Phone: [phone]  OUTSIDE TESTING RESULT REQUEST      TO:   Dr. Rolando Hough RN Today's Date: 3/16/17    FAX #: [phone]     IMPORTANT: FOR YOUR IMMEDIATE ATTENTION  Please FAX all test results listed below to: 6

## (undated) NOTE — IP AVS SNAPSHOT
Patient Demographics     Address  [address] [place] [address] Phone  [phone] (Home) *Preferred*  [phone] [facility]) E-mail Address  [email or web address]      Emergency Contact(s)     Name Relation Home Work Mobile    Leticia Colon Take 100 mg by mouth 2 (two) times daily. insulin glargine 100 UNIT/ML Soln  Commonly known as:  LANTUS  Next dose due: Tonight      Inject 13 Units into the skin nightly.           insulin glargine 100 UNIT/ML Soln  Commonly known as:  LANTUS  N Vitamin D3 02039 units Tabs      Take 1 tablet by mouth once a week.                    [phone] - MAR ACTION REPORT  (last 24 hrs)    ** SITE UNKNOWN **     Order ID Medication Name Action Time Action Reason Comments    [id number] Levothyroxine Sodium (SYN Recent Vital Signs    Flowsheet Row Most Recent Value   Vitals  100/43 Filed at 06/28/2018 1119   Pulse  66 Filed at 06/28/2018 1119   Resp  18 Filed at 06/28/2018 1119   Temp  98.6 °F (37 °C) Filed at 06/28/2018 1119   SpO2  99 % Filed at 0 Chloride 99 101 - 111 mmol/L [facility]   CO2 27.0 22.0 - 32.0 mmol/L [facility]            MAGNESIUM [[id number]] (Normal)  Resulted: 06/28/18 0655, Result status: Final result   Ordering provider:  Gregory Mason MD  06/27/18 2300 Resulting lab:  MELITON Erum Tam Patient Status:  Observation    1940 MRN [id number]   [facility] 5NW-A Attending Leon Whitfield MD   Hosp Day # 0 PCP Kaitlin Jara MD     Chief Complaint: hypoxia    History of Present Illness: Erum Turcios is a • Type II or unspecified type diabetes mellitus without mention of complication, not stated as uncontrolled    • Unspecified disorder of thyroid    • Unspecified essential hypertension    • Unspecified vascular insufficiency of intestine         Past Surgi dextrose 50 % injection 50 mL 50 mL Intravenous Q15 Min PRN Becca Richardson MD    Or        glucose (DEX4) oral liquid 30 g 30 g Oral Q15 Min PRN Becca Richardson MD    Or        Glucose-Vitamin C (DEX-4) 4-0.006 g chewable tab 8 tablet 8 tablet Oral Q15 Current Outpatient Prescriptions on File Prior to Encounter:  Cholecalciferol (VITAMIN D3) 17794 units Oral Tab Take 1 tablet by mouth once a week.  Disp:  Rfl:    ondansetron 4 MG Oral Tablet Dispersible Take 4 mg by mouth every 6 (six) hours as needed for acetaminophen (TYLENOL) 325 MG Oral Tab Take 650 mg by mouth every 6 (six) hours as needed for Pain or Fever. Indications: Fever, Pain Disp:  Rfl:    Pravastatin Sodium (PRAVACHOL) 40 MG Oral Tab Take 40 mg by mouth nightly.  Indications: Type II B Hyperlip Recent Labs   Lab  06/27/18   1127   TROP  <0.046       Imaging: Imaging data reviewed in Epic. ASSESSMENT / PLAN:     1. Hypoxia 2/2 pulmonary edema  1. ddimer and bnp negative  2.  Is obese which may falsely decrease BNP but does not appear to be in Schizophrenia  Depression s/p ECT  Bipolar 1 disorder  DM2 w/ long term insulin w/ hyperglycemia    History of Present Illness: Shana [facility] is a 69year old female with a PMH schizophrenia, depression, bipolar disorder who p/w hypoxia.  Underwent ECT Commonly known as:  TYLENOL      Take 650 mg by mouth every 6 (six) hours as needed for Pain or Fever. Indications: Fever, Pain   Refills:  0     Ferrous Sulfate 324 (65 Fe) MG Tbec      Take 1 tablet by mouth daily.    Refills:  0     HUMALOG KWIKPEN 100 U sodium chloride 1 g Tabs      Take 1 g by mouth 3 (three) times daily with meals. Indications: Electrolyte Disturbance   Refills:  0     Vitamin D3 22154 units Tabs      Take 1 tablet by mouth once a week.    Refills:  0[RS.2]                Follow-up ap Problem List  Principal Problem:    Hypoxia  Active Problems:    Severe bipolar I disorder, most recent episode mixed, with psychotic features (HCC)    Metabolic alkalosis    Hyperglycemia    Type 2 diabetes mellitus with hyperglycemia, with long-term curr 1998: MASTECTOMY LEFT Left      Comment: left breast cancer with mastectomy, chemo and                radiation.   3/3/17: OTHER      Comment: Port-A-Cath - in abdomen   1998: RADIATION LEFT Left      Comment: left breast cancer with mastectomy, chemo and Distance (ft): 10ftx2  Assistive Device: Rolling walker  Pattern: Within Functional Limits          Skilled Therapy Provided: Pt received supine in bed, agreeable to PT. Pt demos supine to/from sit with min A.   Sit to/from stand with supervision and use o No notes of this type exist for this encounter.             Future Appointments        Provider Dre Iglesias    8/8/2018 12:00 PM Hermelindo Mccray MD [address] [facility]

## (undated) NOTE — LETTER
[facility] Testing Department  Phone: [phone]  OUTSIDE TESTING RESULT REQUEST      TO:   [address] Home/2nd FLoor/Cristiana Today's Date: 9/5/17    FAX #: [phone]     IMPORTANT: FOR YOUR IMMEDIATE ATTENTION  Please FAX all test resu

## (undated) NOTE — LETTER
Last Revised 02/07/06  Obstructive Sleep Apnea Questionnaire    Clinical signs and symptoms suggesting the possibility of VIKKI    1. Predisposing physical characteristics (positive with any of the following present)  ? BMI 35kg/m²  ?  Craniofacial abnormalit pauses which are frightening to the observer, patient regularly falls asleep within minutes after being left unstimulated) in which case they should be treated as though they have severe sleep apnea.     The sleep laboratory’s assessment (none, mild, modera Point Total for B           C. Requirement for postoperative opioids.                Opioid requirement             Points   None 0    Low dose oral opiod 1    High dose oral opioids, parenteral or neuraxial opiods 3      Point Total for C        Estimation

## (undated) NOTE — IP AVS SNAPSHOT
[facility] [place] [address] [place], [address] ~ [phone]                Discharge Summary   4/26/2017    Radha Deluna           Admission Information        Provider Department    4/26/2017 Radha Dailey MD  Pacu      All Inject into the skin 3 (three) times daily before meals.  DM<150=0 units -200=2 units; -250= 4 units; -300=6 units;  -350= 8 units; -400=10 units; please call MD for BG <70 and >400      [    ]    [    ]    [    ]    [    ] [    ]       omeprazole 20 MG Cpdr   Commonly known as:  PRILOSEC        Take 20 mg by mouth every morning before breakfast.      [    ]    [    ]    [    ]    [    ]       ondansetron 4 MG Tbdp   Commonly known as:  ZOFRAN-ODT        Take 4 mg by mouth e ECT, they are encouraged to contact the nurse at [phone]. If she is unavailable, please leave a message and you will be contacted as soon as possible.      *You should also contact the nurse during regular office hours Mon-Fri 9am-3pm if you need to ca Abs Final Neut Abs Lymphocyte Abso Monocyte Absolu Eosinophil Abso Basophil Absolu    (03/04/17)  67.4 (03/04/17)  22.4 (03/04/17)  8.0 (03/04/17)  1.1 (03/04/17)  0.6 -- (03/04/17)  6.86 (H) (03/04/17)  2.28 (03/04/17)  0.81 (H) (03/04/17)  0.11 (03/04/17 view more details from this visit by going to [email or web address]. If you've recently had a stay at the Hospital you can access your discharge instructions in [employer] by going to Visits < Admission Summaries.  If you've been to the Emergency Depar

## (undated) NOTE — Clinical Note
2017    Patient: Rhea Campbell  : 1940 Visit date: 2017    Dear  Dr. Jesica Williamson MD,    Thank you for referring Rhea Campbell to my practice. Please find my assessment and plan below.            Assessment   Superior vena cava syndrome breast cancer at age 49. There is nobody in the family with cancer of the ovaries. The patient does not appear to be on any hormonal replacement therapy.   She is no longer seeing an oncologist or any surgeon regarding her chest wall and for cancer surv The patient demonstrates profound left arm nonpitting lymphedema where there is significant swelling all the way down to the fingertips up to the shoulder. The right arm shows no evidence of lymphedema.     This patient is here for placement of a PowerPort

## (undated) NOTE — ED AVS SNAPSHOT
Shana Chavarriachang   MRN: [id number]    Department:  [facility] Emergency Department   Date of Visit:  9/12/2018           Disclosure     Insurance plans vary and the physician(s) referred by the ER may not be covered by your plan.  Please contact y tell this physician (or your personal doctor if your instructions are to return to your personal doctor) about any new or lasting problems. The primary care or specialist physician will see patients referred from the [facility] Emergency Department.  Flip Maxwell

## (undated) NOTE — LETTER
[facility] Testing Department  Phone: [phone]  OUTSIDE TESTING RESULT REQUEST      TO:   Mima Montano Today's Date: 1/2/18    FAX #:      IMPORTANT: FOR YOUR IMMEDIATE ATTENTION  Please FAX all test results listed below to: 630

## (undated) NOTE — LETTER
[facility]  Corinne Fair [address] [facility], [address] [place] [address]    Consent for Operation    Date: __________________    Time: _______________    1. I authorize the performance upon Radha Deluna the following operation:    [facility]   2.  I videotape. The [facility] will not be responsible for storage or maintenance of this tape. 6. For the purpose of advancing medical education, I consent to the admittance of observers to the Operating Room.     7. I authorize the use of any specimen, organs Signature of Patient:   ___________________________    When the patient is a minor or mentally incompetent to give consent:  Signature of person authorized to consent for patient: ___________________________   Relationship to patient: _____________________ drugs/illegal medications). Failure to inform my anesthesiologist about these medicines may increase my risk of anesthetic complications. · If I am allergic to anything or have had a reaction to anesthesia before.     3. I understand how the anesthesia med I have discussed the procedure and information above with the patient (or patient’s representative) and answered their questions. The patient or their representative has agreed to have anesthesia services.     _______________________________________________

## (undated) NOTE — IP AVS SNAPSHOT
[facility] [place] [address] [place], [address] ~ [phone]                Discharge Summary   3/1/2017    Rakesh King           Admission Information        Provider Department    3/1/2017 Ivana Gonzalez MD  Pacu      Aller Take 100 mg by mouth 2 (two) times daily. [    ]    [    ]    [    ]    [    ]       insulin glargine 100 UNIT/ML Soln   Commonly known as:  LANTUS        Inject 20 Units into the skin nightly.       [    ]    [    ]    [    ]    [    ]       insulin [    ]    [    ]       Pravastatin Sodium 40 MG Tabs   Commonly known as:  PRAVACHOL        Take 40 mg by mouth nightly.  Indications: Type II B Hyperlipidemia      [    ]    [    ]    [    ]    [    ]       Sertraline HCl 100 MG Tabs   Commonly known as: · As you awaken, you may experience one or more of the following:  Headache, nausea, temporary confusion, or muscle stiffness.   If these symptoms increase, become severe or are accompanied by a fever of more than 101, call your own psychiatrist  · The ECT and ask to get set up for an insurance coverage that is in-network with [facility] Gen[facility].         Visit Information        Provider Department Dept Phone    3/1/2017  5:30 AM PACU ROOM  Pacu [phone]         We want to hear from you       We wan

## (undated) NOTE — MR AVS SNAPSHOT
[facility]  [address], [address] [address] [phone]               Thank you for choosing us for your health care visit with Clarisa Cardenas MD.  We are glad to serve you and happy to provide you with this summary of you She had her first period at age 13, her first child at age 26. She went through the change in life at age 55. She has a sister who  from breast cancer at age 48. There is nobody in the family with cancer of the ovaries.     The patient does not appea Breath sounds are clear to auscultation bilaterally without wheezes rales or rhonchi. The heart currently shows no murmur regular rate and rhythm. There are no extra heart sounds.     The patient demonstrates profound left arm nonpitting lymphedema where to understand and is consented for the procedures as listed above. Follow Up with Our Office     Return in about 10 days (around 3/2/2017).       Your Appointments     Mar 01, 2017  5:30 AM   [facility] with [facility] RADHA Inject 20 Units into the skin 2 (two) times daily. 44 units in the am and 20 units at HS   Commonly known as:  LANTUS           ipratropium-albuterol 0.5-2.5 (3) MG/3ML Soln   Take 3 mL by nebulization every 6 (six) hours as needed.    Commonly known as:  D Imaging:  US VENOUS DOPPLER ARM BILAT - DIAG IMG (CPT=93970)    Instructions: To schedule an appointment for your radiology test please call Romie Guevara [facility] Scheduling at [phone].          [facility]     Visit [facility]  You can access your MyC

## (undated) NOTE — LETTER
[facility]  Phone: [phone]  Right Fax: [phone] [address] By:  Isaías Robertson RN Date: 1/15/20    Patient Name: Lum Hitch  Surgery Date: 01/29/20    CSN: [id number]  Medical Record: [id number]